# Patient Record
Sex: FEMALE | Race: BLACK OR AFRICAN AMERICAN | NOT HISPANIC OR LATINO | Employment: FULL TIME | URBAN - METROPOLITAN AREA
[De-identification: names, ages, dates, MRNs, and addresses within clinical notes are randomized per-mention and may not be internally consistent; named-entity substitution may affect disease eponyms.]

---

## 2018-01-15 NOTE — PROGRESS NOTES
Chief Complaint  Patient is here for a blood pressure check per Dr Sixto Trujillo  Patient denies cardiac complaints  I reviewed blood pressure reading with Dr Cheryl Danielle today  Patient was advised to continue taking current medication and checking blood pressure at home per Dr Cheryl Danielle  Active Problems    1  Abnormal bone density screening (794 9) (R93 7)   2  Abnormal echocardiogram (793 2) (R93 1)   3  Bacterial vaginosis (616 10,041 9) (N76 0,B96 89)   4  Candidiasis (112 9) (B37 9)   5  Carotid bruit (785 9) (R09 89)   6  Cough (786 2) (R05)   7  Diastolic dysfunction (126 0) (I51 9)   8  Encounter for gynecological examination without abnormal finding (V72 31) (Z01 419)   9  Encounter for screening for human papillomavirus (hpv) (V73 81) (Z11 51)   10  Encounter for screening mammogram for malignant neoplasm of breast (V76 12)    (Z12 31)   11  HTN (hypertension) (401 9) (I10)   12  Hyperlipidemia (272 4) (E78 5)   13  Hyponatremia (276 1) (E87 1)   14  Left atrial dilation (429 3) (I51 7)   15  Leukocytosis (288 60) (D72 829)   16  Low bone density (733 99) (M85 80)   17  LVH (left ventricular hypertrophy) (429 3) (I51 7)   18  Mild tricuspid regurgitation (397 0) (I07 1)   19  Need for immunization against influenza (V04 81) (Z23)   20  Pap smear for cervical cancer screening (V76 2) (Z12 4)   21  Proteinuria (791 0) (R80 9)   22  Screening breast examination (V76 10) (Z12 39)   23  Screening for breast cancer (V76 10) (Z12 39)   24  Screening for colon cancer (V76 51) (Z12 11)   25  Screening for hypothyroidism (V77 0) (Z13 29)   26  Tobacco abuse (305 1) (Z72 0)   27  Trace mitral regurgitation by prior echocardiogram (424 0) (I34 0)   28  UTI (urinary tract infection) (599 0) (N39 0)   29  Visit for screening mammogram (V76 12) (Z12 31)    Current Meds   1  AmLODIPine Besylate 5 MG Oral Tablet; take one tablet once daily;    Therapy: 45FRN3741 to (Evaluate:[id number])  Requested for: 51ZXH8220; Last Rx:47Rnp8062 Ordered   2  Metoprolol Tartrate 25 MG Oral Tablet; take 1 tablet twice a day; Therapy: 82HQK8836 to (Kiley Orozco)  Requested for: 85UDF8285 Recorded    Allergies    1  Lisinopril TABS    Assessment    1  HTN (hypertension) (401 9) (I10)    Future Appointments    Date/Time Provider Specialty Site   10/27/2016 02:40 PM ROBERT Driscoll  Obstetrics/Gynecology [facility]   07/26/2016 10:30 AM ROBERT Valerio   Internal Medicine [address] [place] [address] INTERNAL MED     Signatures   Electronically signed by : Richard Corey, ; Mar 25 2016  3:23PM EST                       (Author)    Electronically signed by : ROBERT Arguelles ; Mar 25 2016  3:26PM EST                       (Author)

## 2020-01-20 ENCOUNTER — HOSPITAL ENCOUNTER (OUTPATIENT)
Facility: HOSPITAL | Age: 60
Setting detail: OBSERVATION
Discharge: HOME | End: 2020-01-21
Attending: EMERGENCY MEDICINE | Admitting: INTERNAL MEDICINE
Payer: COMMERCIAL

## 2020-01-20 ENCOUNTER — APPOINTMENT (EMERGENCY)
Dept: RADIOLOGY | Facility: HOSPITAL | Age: 60
End: 2020-01-20
Payer: COMMERCIAL

## 2020-01-20 ENCOUNTER — DOCUMENTATION (OUTPATIENT)
Dept: OTHER | Facility: HOSPITAL | Age: 60
End: 2020-01-20

## 2020-01-20 DIAGNOSIS — I10 HTN (HYPERTENSION): ICD-10-CM

## 2020-01-20 DIAGNOSIS — R91.1 LUNG NODULE: Primary | ICD-10-CM

## 2020-01-20 DIAGNOSIS — R94.31 ABNORMAL EKG: ICD-10-CM

## 2020-01-20 DIAGNOSIS — R07.89 CHEST WALL PAIN: ICD-10-CM

## 2020-01-20 DIAGNOSIS — I16.0 HYPERTENSIVE URGENCY: ICD-10-CM

## 2020-01-20 DIAGNOSIS — I25.10 ASCVD (ARTERIOSCLEROTIC CARDIOVASCULAR DISEASE): ICD-10-CM

## 2020-01-20 PROBLEM — F17.200 SMOKER: Status: ACTIVE | Noted: 2020-01-20

## 2020-01-20 LAB
ALBUMIN SERPL BCP-MCNC: 4.3 G/DL (ref 3.5–5)
ALP SERPL-CCNC: 92 U/L (ref 46–116)
ALT SERPL W P-5'-P-CCNC: 100 U/L (ref 12–78)
AMPHETAMINES SERPL QL SCN: NEGATIVE
ANION GAP SERPL CALCULATED.3IONS-SCNC: 8 MMOL/L (ref 4–13)
AST SERPL W P-5'-P-CCNC: 76 U/L (ref 5–45)
ATRIAL RATE: 68 BPM
ATRIAL RATE: 97 BPM
BARBITURATES UR QL: NEGATIVE
BASOPHILS # BLD AUTO: 0.06 THOUSANDS/ΜL (ref 0–0.1)
BASOPHILS NFR BLD AUTO: 1 % (ref 0–1)
BENZODIAZ UR QL: NEGATIVE
BILIRUB SERPL-MCNC: 1.33 MG/DL (ref 0.2–1)
BILIRUB UR QL STRIP: NEGATIVE
BUN SERPL-MCNC: 7 MG/DL (ref 5–25)
CALCIUM SERPL-MCNC: 9.7 MG/DL (ref 8.3–10.1)
CHLORIDE SERPL-SCNC: 98 MMOL/L (ref 100–108)
CLARITY UR: CLEAR
CO2 SERPL-SCNC: 26 MMOL/L (ref 21–32)
COCAINE UR QL: NEGATIVE
COLOR UR: YELLOW
CREAT SERPL-MCNC: 0.77 MG/DL (ref 0.6–1.3)
CREAT UR-MCNC: 119 MG/DL
D DIMER PPP FEU-MCNC: 0.96 UG/ML FEU
EOSINOPHIL # BLD AUTO: 0.07 THOUSAND/ΜL (ref 0–0.61)
EOSINOPHIL NFR BLD AUTO: 1 % (ref 0–6)
ERYTHROCYTE [DISTWIDTH] IN BLOOD BY AUTOMATED COUNT: 14.3 % (ref 11.6–15.1)
EST. AVERAGE GLUCOSE BLD GHB EST-MCNC: 97 MG/DL
GFR SERPL CREATININE-BSD FRML MDRD: 98 ML/MIN/1.73SQ M
GLUCOSE SERPL-MCNC: 92 MG/DL (ref 65–140)
GLUCOSE UR STRIP-MCNC: NEGATIVE MG/DL
HBA1C MFR BLD: 5 % (ref 4.2–6.3)
HCT VFR BLD AUTO: 35.6 % (ref 34.8–46.1)
HGB BLD-MCNC: 12.7 G/DL (ref 11.5–15.4)
HGB UR QL STRIP.AUTO: NEGATIVE
IMM GRANULOCYTES # BLD AUTO: 0.05 THOUSAND/UL (ref 0–0.2)
IMM GRANULOCYTES NFR BLD AUTO: 0 % (ref 0–2)
KETONES UR STRIP-MCNC: ABNORMAL MG/DL
LEUKOCYTE ESTERASE UR QL STRIP: NEGATIVE
LYMPHOCYTES # BLD AUTO: 1.73 THOUSANDS/ΜL (ref 0.6–4.47)
LYMPHOCYTES NFR BLD AUTO: 13 % (ref 14–44)
MCH RBC QN AUTO: 30.8 PG (ref 26.8–34.3)
MCHC RBC AUTO-ENTMCNC: 35.7 G/DL (ref 31.4–37.4)
MCV RBC AUTO: 86 FL (ref 82–98)
METHADONE UR QL: NEGATIVE
MICROALBUMIN UR-MCNC: 13.2 MG/L (ref 0–20)
MICROALBUMIN/CREAT 24H UR: 11 MG/G CREATININE (ref 0–30)
MONOCYTES # BLD AUTO: 1.24 THOUSAND/ΜL (ref 0.17–1.22)
MONOCYTES NFR BLD AUTO: 10 % (ref 4–12)
NEUTROPHILS # BLD AUTO: 9.8 THOUSANDS/ΜL (ref 1.85–7.62)
NEUTS SEG NFR BLD AUTO: 75 % (ref 43–75)
NITRITE UR QL STRIP: NEGATIVE
NRBC BLD AUTO-RTO: 0 /100 WBCS
OPIATES UR QL SCN: POSITIVE
P AXIS: 235 DEGREES
P AXIS: 68 DEGREES
PCP UR QL: NEGATIVE
PH UR STRIP.AUTO: 7 [PH]
PLATELET # BLD AUTO: 469 THOUSANDS/UL (ref 149–390)
PMV BLD AUTO: 10.4 FL (ref 8.9–12.7)
POTASSIUM SERPL-SCNC: 3.6 MMOL/L (ref 3.5–5.3)
PR INTERVAL: 124 MS
PR INTERVAL: 128 MS
PROT SERPL-MCNC: 8 G/DL (ref 6.4–8.2)
PROT UR STRIP-MCNC: NEGATIVE MG/DL
QRS AXIS: 56 DEGREES
QRS AXIS: 79 DEGREES
QRSD INTERVAL: 78 MS
QRSD INTERVAL: 78 MS
QT INTERVAL: 322 MS
QT INTERVAL: 380 MS
QTC INTERVAL: 404 MS
QTC INTERVAL: 408 MS
RBC # BLD AUTO: 4.12 MILLION/UL (ref 3.81–5.12)
SODIUM SERPL-SCNC: 132 MMOL/L (ref 136–145)
SP GR UR STRIP.AUTO: >1.045 (ref 1–1.03)
T WAVE AXIS: -22 DEGREES
T WAVE AXIS: 86 DEGREES
THC UR QL: POSITIVE
TROPONIN I SERPL-MCNC: <0.02 NG/ML
TSH SERPL DL<=0.05 MIU/L-ACNC: 5.93 UIU/ML (ref 0.36–3.74)
UROBILINOGEN UR QL STRIP.AUTO: 1 E.U./DL
VENTRICULAR RATE: 68 BPM
VENTRICULAR RATE: 97 BPM
WBC # BLD AUTO: 12.95 THOUSAND/UL (ref 4.31–10.16)

## 2020-01-20 PROCEDURE — 86705 HEP B CORE ANTIBODY IGM: CPT | Performed by: INTERNAL MEDICINE

## 2020-01-20 PROCEDURE — 93005 ELECTROCARDIOGRAM TRACING: CPT

## 2020-01-20 PROCEDURE — 86803 HEPATITIS C AB TEST: CPT | Performed by: INTERNAL MEDICINE

## 2020-01-20 PROCEDURE — 36415 COLL VENOUS BLD VENIPUNCTURE: CPT

## 2020-01-20 PROCEDURE — 84484 ASSAY OF TROPONIN QUANT: CPT | Performed by: EMERGENCY MEDICINE

## 2020-01-20 PROCEDURE — 94640 AIRWAY INHALATION TREATMENT: CPT

## 2020-01-20 PROCEDURE — 99285 EMERGENCY DEPT VISIT HI MDM: CPT

## 2020-01-20 PROCEDURE — 82570 ASSAY OF URINE CREATININE: CPT | Performed by: INTERNAL MEDICINE

## 2020-01-20 PROCEDURE — 71275 CT ANGIOGRAPHY CHEST: CPT

## 2020-01-20 PROCEDURE — 99285 EMERGENCY DEPT VISIT HI MDM: CPT | Performed by: EMERGENCY MEDICINE

## 2020-01-20 PROCEDURE — 81003 URINALYSIS AUTO W/O SCOPE: CPT | Performed by: INTERNAL MEDICINE

## 2020-01-20 PROCEDURE — 82043 UR ALBUMIN QUANTITATIVE: CPT | Performed by: INTERNAL MEDICINE

## 2020-01-20 PROCEDURE — 83036 HEMOGLOBIN GLYCOSYLATED A1C: CPT | Performed by: INTERNAL MEDICINE

## 2020-01-20 PROCEDURE — 93010 ELECTROCARDIOGRAM REPORT: CPT | Performed by: INTERNAL MEDICINE

## 2020-01-20 PROCEDURE — 85025 COMPLETE CBC W/AUTO DIFF WBC: CPT | Performed by: EMERGENCY MEDICINE

## 2020-01-20 PROCEDURE — 36415 COLL VENOUS BLD VENIPUNCTURE: CPT | Performed by: INTERNAL MEDICINE

## 2020-01-20 PROCEDURE — 96375 TX/PRO/DX INJ NEW DRUG ADDON: CPT

## 2020-01-20 PROCEDURE — 84443 ASSAY THYROID STIM HORMONE: CPT | Performed by: INTERNAL MEDICINE

## 2020-01-20 PROCEDURE — 87340 HEPATITIS B SURFACE AG IA: CPT | Performed by: INTERNAL MEDICINE

## 2020-01-20 PROCEDURE — 80307 DRUG TEST PRSMV CHEM ANLYZR: CPT | Performed by: INTERNAL MEDICINE

## 2020-01-20 PROCEDURE — 86704 HEP B CORE ANTIBODY TOTAL: CPT | Performed by: INTERNAL MEDICINE

## 2020-01-20 PROCEDURE — 96374 THER/PROPH/DIAG INJ IV PUSH: CPT

## 2020-01-20 PROCEDURE — 85379 FIBRIN DEGRADATION QUANT: CPT | Performed by: EMERGENCY MEDICINE

## 2020-01-20 PROCEDURE — 84484 ASSAY OF TROPONIN QUANT: CPT | Performed by: INTERNAL MEDICINE

## 2020-01-20 PROCEDURE — 71046 X-RAY EXAM CHEST 2 VIEWS: CPT

## 2020-01-20 PROCEDURE — 80053 COMPREHEN METABOLIC PANEL: CPT | Performed by: EMERGENCY MEDICINE

## 2020-01-20 RX ORDER — KETOROLAC TROMETHAMINE 30 MG/ML
15 INJECTION, SOLUTION INTRAMUSCULAR; INTRAVENOUS ONCE
Status: COMPLETED | OUTPATIENT
Start: 2020-01-20 | End: 2020-01-20

## 2020-01-20 RX ORDER — LABETALOL 20 MG/4 ML (5 MG/ML) INTRAVENOUS SYRINGE
20 EVERY 4 HOURS PRN
Status: DISCONTINUED | OUTPATIENT
Start: 2020-01-20 | End: 2020-01-21 | Stop reason: HOSPADM

## 2020-01-20 RX ORDER — NICOTINE 21 MG/24HR
1 PATCH, TRANSDERMAL 24 HOURS TRANSDERMAL DAILY
Status: DISCONTINUED | OUTPATIENT
Start: 2020-01-21 | End: 2020-01-21 | Stop reason: HOSPADM

## 2020-01-20 RX ORDER — NITROGLYCERIN 0.4 MG/1
0.4 TABLET SUBLINGUAL
Status: DISCONTINUED | OUTPATIENT
Start: 2020-01-20 | End: 2020-01-21 | Stop reason: HOSPADM

## 2020-01-20 RX ORDER — HYDRALAZINE HYDROCHLORIDE 20 MG/ML
10 INJECTION INTRAMUSCULAR; INTRAVENOUS EVERY 6 HOURS PRN
Status: DISCONTINUED | OUTPATIENT
Start: 2020-01-20 | End: 2020-01-21 | Stop reason: HOSPADM

## 2020-01-20 RX ORDER — AMLODIPINE BESYLATE 10 MG/1
10 TABLET ORAL DAILY
Status: DISCONTINUED | OUTPATIENT
Start: 2020-01-20 | End: 2020-01-21 | Stop reason: HOSPADM

## 2020-01-20 RX ORDER — ACETAMINOPHEN 325 MG/1
650 TABLET ORAL EVERY 4 HOURS PRN
Status: DISCONTINUED | OUTPATIENT
Start: 2020-01-20 | End: 2020-01-21 | Stop reason: HOSPADM

## 2020-01-20 RX ORDER — ONDANSETRON 2 MG/ML
4 INJECTION INTRAMUSCULAR; INTRAVENOUS ONCE
Status: DISCONTINUED | OUTPATIENT
Start: 2020-01-20 | End: 2020-01-21

## 2020-01-20 RX ORDER — ASPIRIN 81 MG/1
324 TABLET, CHEWABLE ORAL ONCE
Status: COMPLETED | OUTPATIENT
Start: 2020-01-20 | End: 2020-01-20

## 2020-01-20 RX ORDER — ONDANSETRON 2 MG/ML
4 INJECTION INTRAMUSCULAR; INTRAVENOUS EVERY 6 HOURS PRN
Status: DISCONTINUED | OUTPATIENT
Start: 2020-01-20 | End: 2020-01-21 | Stop reason: HOSPADM

## 2020-01-20 RX ORDER — CHLORTHALIDONE 25 MG/1
50 TABLET ORAL DAILY
Status: DISCONTINUED | OUTPATIENT
Start: 2020-01-20 | End: 2020-01-21 | Stop reason: HOSPADM

## 2020-01-20 RX ORDER — IPRATROPIUM BROMIDE AND ALBUTEROL SULFATE 2.5; .5 MG/3ML; MG/3ML
3 SOLUTION RESPIRATORY (INHALATION) ONCE
Status: COMPLETED | OUTPATIENT
Start: 2020-01-20 | End: 2020-01-20

## 2020-01-20 RX ORDER — LIDOCAINE 50 MG/G
1 PATCH TOPICAL DAILY
Status: DISCONTINUED | OUTPATIENT
Start: 2020-01-21 | End: 2020-01-21 | Stop reason: HOSPADM

## 2020-01-20 RX ORDER — ONDANSETRON 2 MG/ML
INJECTION INTRAMUSCULAR; INTRAVENOUS
Status: DISPENSED
Start: 2020-01-20 | End: 2020-01-20

## 2020-01-20 RX ADMIN — IOHEXOL 85 ML: 350 INJECTION, SOLUTION INTRAVENOUS at 14:21

## 2020-01-20 RX ADMIN — CHLORTHALIDONE 50 MG: 25 TABLET ORAL at 20:40

## 2020-01-20 RX ADMIN — IPRATROPIUM BROMIDE AND ALBUTEROL SULFATE 3 ML: .5; 3 SOLUTION RESPIRATORY (INHALATION) at 11:34

## 2020-01-20 RX ADMIN — LABETALOL 20 MG/4 ML (5 MG/ML) INTRAVENOUS SYRINGE 20 MG: at 18:50

## 2020-01-20 RX ADMIN — KETOROLAC TROMETHAMINE 15 MG: 30 INJECTION, SOLUTION INTRAMUSCULAR at 15:50

## 2020-01-20 RX ADMIN — AMLODIPINE BESYLATE 10 MG: 10 TABLET ORAL at 20:40

## 2020-01-20 RX ADMIN — ASPIRIN 81 MG 324 MG: 81 TABLET ORAL at 11:33

## 2020-01-20 RX ADMIN — MORPHINE SULFATE 2 MG: 2 INJECTION, SOLUTION INTRAMUSCULAR; INTRAVENOUS at 11:34

## 2020-01-20 NOTE — DISCHARGE INSTRUCTIONS
Hypertension   WHAT YOU NEED TO KNOW:   Hypertension is high blood pressure (BP)  Your BP is the force of your blood moving against the walls of your arteries  Normal BP is less than 120/80  Prehypertension is between 120/80 and 139/89  Hypertension is 140/90 or higher  Hypertension causes your BP to get so high that your heart has to work much harder than normal  This can damage your heart  You can control hypertension with a healthy lifestyle or medicines  A controlled blood pressure helps protect your organs, such as your heart, lungs, brain, and kidneys  DISCHARGE INSTRUCTIONS:   Call 911 for any of the following:   · You have discomfort in your chest that feels like squeezing, pressure, fullness, or pain  · You become confused or have difficulty speaking  · You suddenly feel lightheaded or have trouble breathing  · You have pain or discomfort in your back, neck, jaw, stomach, or arm  Return to the emergency department if:   · You have a severe headache or vision loss  · You have weakness in an arm or leg  Contact your healthcare provider if:   · You feel faint, dizzy, confused, or drowsy  · You have been taking your BP medicine and your BP is still higher than your healthcare provider says it should be  · You have questions or concerns about your condition or care  Medicines: You may  need any of the following:  · Medicine  may be used to help lower your BP  You may need more than one type of medicine  Take the medicine exactly as directed  · Diuretics  help decrease extra fluid that collects in your body  This will help lower your BP  You may urinate more often while you take this medicine  · Cholesterol medicine  helps lower your cholesterol level  A low cholesterol level helps prevent heart disease and makes it easier to control your blood pressure  · Take your medicine as directed    Contact your healthcare provider if you think your medicine is not helping or if you have side effects  Tell him or her if you are allergic to any medicine  Keep a list of the medicines, vitamins, and herbs you take  Include the amounts, and when and why you take them  Bring the list or the pill bottles to follow-up visits  Carry your medicine list with you in case of an emergency  Follow up with your healthcare provider as directed: You will need to return to have your BP checked and to have other lab tests done  Write down your questions so you remember to ask them during your visits  Manage hypertension:  Talk with your healthcare provider about these and other ways to manage hypertension:  · Check your BP at home  Sit and rest for 5 minutes before you take your BP  Extend your arm and support it on a flat surface  Your arm should be at the same level as your heart  Follow the directions that came with your BP monitor  If possible, take at least 2 BP readings each time  Take your BP at least twice a day at the same times each day, such as morning and evening  Keep a record of your BP readings and bring it to your follow-up visits  Ask your healthcare provider what your BP should be  · Limit sodium (salt) as directed  Too much sodium can affect your fluid balance  Check labels to find low-sodium or no-salt-added foods  Some low-sodium foods use potassium salts for flavor  Too much potassium can also cause health problems  Your healthcare provider will tell you how much sodium and potassium are safe for you to have in a day  He or she may recommend that you limit sodium to 2,300 mg a day  · Follow the meal plan recommended by your healthcare provider  A dietitian or your provider can give you more information on low-sodium plans or the DASH (Dietary Approaches to Stop Hypertension) eating plan  The DASH plan is low in sodium, unhealthy fats, and total fat  It is high in potassium, calcium, and fiber  · Exercise to maintain a healthy weight    Exercise at least 30 minutes per day, on most days of the week  This will help decrease your blood pressure  Ask your healthcare provider about the best exercise plan for you  · Decrease stress  This may help lower your BP  Learn ways to relax, such as deep breathing or listening to music  · Limit alcohol  Women should limit alcohol to 1 drink a day  Men should limit alcohol to 2 drinks a day  A drink of alcohol is 12 ounces of beer, 5 ounces of wine, or 1½ ounces of liquor  · Do not smoke  Nicotine and other chemicals in cigarettes and cigars can increase your BP and also cause lung damage  Ask your healthcare provider for information if you currently smoke and need help to quit  E-cigarettes or smokeless tobacco still contain nicotine  Talk to your healthcare provider before you use these products  · Manage any other health conditions you have  Health conditions such as diabetes can increase your risk for hypertension  Follow your healthcare provider's instructions and take all your medicines as directed  © 2017 [address] Information is for End User's use only and may not be sold, redistributed or otherwise used for commercial purposes  All illustrations and images included in CareNotes® are the copyrighted property of [employer] , [employer]  or [employer]  The above information is an  only  It is not intended as medical advice for individual conditions or treatments  Talk to your doctor, nurse or pharmacist before following any medical regimen to see if it is safe and effective for you

## 2020-01-20 NOTE — H&P
History & Physical - SOD      PATIENT INFORMATION      Jl Pierson 61 y o  female MRN: [id number]  Unit/Bed#: ED 16 Encounter: [id number]  Admitting Physician: Nathalie Galindo MD  PCP: Paty Louis MD  Date of Admission:  01/20/20      ASSESSMENTS & PLAN       1  Musculoskeletal chest pain  Atypical   Heart score 3  Not related to exertion or rest   Worse with palpation, arm movement, and multiple physicians  Improved with Ketoralac  Likely musculoskeletal in nature  However will perform ACS rule  · EKG/troponin x3  · Telemetry  · Nitroglycerin p r n  · Status post aspirin  · Voltaren gel and tylenol for musculoskeletal pain  · Blood pressure control as below    2  Hypertensive urgency  No evidence of visual changes, pulmonary edema, altered mental status, dissection his suggest emergency  Previously on blood pressure medications, however has not seen a doctor for a while due to losing insurance per patient  · Start amlodipine 10 mg and chlorthalidone 50 mg  · Labetalol p r n  For systolic blood pressure greater than 160    3  Smoker  Half pack per day for 30 years  Actively smoking  · Nicotine patch  · Counseled on cessation    4  Elevated LFTs  Possibly secondary to alcohol use  Patient drinks 6 pack of beer a day on week  · RUQ U/S, Chronic Hep Panel  · Counseled on alcohol cessation      VTE Pharmacologic Prophylaxis: Enoxaparin (Lovenox)   VTE Mechanical Prophylaxis: SCD's      CHIEF COMPLAINT      CP      HISTORY OF PRESENT ILLNESS      Jl Pierson is a 61 y o  female who presents with past medical history of hypertension come smoking presents for chest pain  Patient states for the past 2 days she has intermittent right-sided chest pain, 5/10 intensity, pressure-like in quality, intermittently radiating to the back  Pain is worse with arm movement, body torsion, cough, palpation  Alleviated with Toradol  No other new symptoms    Does have a chronic cough she attributes to smoking that is intermittently productive of white sputum  Denies all other review of symptoms  In the ED, vital signs showed systolic blood pressure 704 and diastolic blood pressure 604  Troponin was negative  Initial EKG was normal however repeat EKG showed T-wave flattening in the inferior leads  Labs are significant for WBC 12 95 , sodium 132, elevated LFTs with AST 76   REVIEW OF SYSTEMS      ROS: A complete 12 point ROS is negative other than that noted in the HPI      PAST MEDICAL & SURGICAL HISTORY      Past Medical History:   Diagnosis Date    Hypertension     Smoking 1/2 pack a day or less        No past surgical history on file  MEDICATIONS & ALLERGIES     Prior to Admission medications    Not on File         Allergies: Allergies   Allergen Reactions    Lisinopril          SOCIAL HISTORY      Substance Use History:   Social History     Substance and Sexual Activity   Alcohol Use Never    Frequency: Never     Social History     Tobacco Use   Smoking Status Current Every Day Smoker    Types: Cigarettes   Smokeless Tobacco Never Used     Social History     Substance and Sexual Activity   Drug Use Never         FAMILY HISTORY      No family history on file  PHYSICAL EXAM      Vitals:   Blood Pressure: (!) 217/88 (01/20/20 1700)  Pulse: 90 (01/20/20 1700)  Temperature: 98 4 °F (36 9 °C) (01/20/20 1026)  Temp Source: Oral (01/20/20 1026)  Respirations: (!) 32 (01/20/20 1700)  Weight - Scale: 49 8 kg (109 lb 12 6 oz) (01/20/20 1026)  SpO2: 100 % (01/20/20 1700)      GENERAL: NAD  HEENT:  NC/AT, PERRL, EOMI, MMM, no scleral icterus  CARDIAC:  RRR, +S1/S2, no m/g/r, no S3/S4 heard  PULMONARY:  CTA B/L, no wheezing/rales/rhonci, non-labored breathing  ABDOMEN:  Soft, NT/ND, +BS, no rebound/guarding/rigidity  Extremities:  2+ Pulses in DP/PT  No edema, cyanosis, or clubbing  NEUROLOGIC:  Alert/oriented x3   No motor or sensory deficits  SKIN:  No rashes or erythema      ADDITIONAL DATA     Lab Results:     Results from last 7 days   Lab Units 01/20/20  1053   WBC Thousand/uL 12 95*   HEMOGLOBIN g/dL 12 7   HEMATOCRIT % 35 6   PLATELETS Thousands/uL 469*   NEUTROS PCT % 75   LYMPHS PCT % 13*   MONOS PCT % 10   EOS PCT % 1     Results from last 7 days   Lab Units 01/20/20  1053   POTASSIUM mmol/L 3 6   CHLORIDE mmol/L 98*   CO2 mmol/L 26   BUN mg/dL 7   CREATININE mg/dL 0 77   CALCIUM mg/dL 9 7   ALK PHOS U/L 92   ALT U/L 100*   AST U/L 76*           Imaging:     Xr Chest 2 Views    Result Date: 1/20/2020  Narrative: CHEST INDICATION:   Chest Pain  Shortness of breath and cough COMPARISON:  2/3/2015 EXAM PERFORMED/VIEWS:  XR CHEST PA & LATERAL  The frontal view was performed utilizing dual energy radiographic technique  FINDINGS: Cardiomediastinal silhouette appears unremarkable  The lungs are clear  No pneumothorax or pleural effusion  Osseous structures appear within normal limits for patient age  Impression: No acute cardiopulmonary disease  Workstation performed: [id number]     Hernandez Dodsons Ed Chest Pe Study    Result Date: 1/20/2020  Narrative: CTA - CHEST WITH IV CONTRAST - PULMONARY ANGIOGRAM INDICATION:   PE suspected, intermediate prob, positive D-dimer  COMPARISON: None  TECHNIQUE: CTA examination of the chest was performed using angiographic technique according to a protocol specifically tailored to evaluate for pulmonary embolism  Axial, sagittal, and coronal 2D reformatted images were created from the source data and  submitted for interpretation  In addition, coronal 3D MIP postprocessing was performed on the acquisition scanner  Radiation dose length product (DLP) for this visit:  217 mGy-cm   This examination, like all CT scans performed in the [facility], was performed utilizing techniques to minimize radiation dose exposure, including the use of iterative reconstruction and automated exposure control   IV Contrast:  85 mL of iohexol (OMNIPAQUE)  FINDINGS: PULMONARY ARTERIAL TREE:  No pulmonary embolus is seen  LUNGS:  Several subcentimeter nodules are seen along the right major fissure, likely fissural lymph nodes  3 mm groundglass nodule is present within the left upper lobe (series 4 image 57)  3 mm nodules present within the left lower lobe (series 4 image 89)  4 mm nodule is present within the left lower lobe (series 4 image 74)  Centrilobular and paraseptal emphysema is present  No focal consolidations, pneumothorax, or tracheal lesions  PLEURA:  Unremarkable  HEART/GREAT VESSELS:  Unremarkable for patient's age  MEDIASTINUM AND CASSIUS:  Unremarkable  CHEST WALL AND LOWER NECK:   Unremarkable  VISUALIZED STRUCTURES IN THE UPPER ABDOMEN:  Unremarkable  OSSEOUS STRUCTURES:  No acute fracture or destructive osseous lesion  Impression: 1  No pulmonary embolism  2   Subcentimeter pulmonary nodules, largest measuring 4 mm  Based on current Fleischner Society 2017 Guidelines on incidental pulmonary nodule, because the patient is considered high risk for lung cancer, 12 month follow-up non-contrast chest CT is recommended  The study was marked in EPIC for significant notification  Workstation performed: [id number]       EKG, Pathology, and Other Studies Reviewed on Admission:   · EKG: Reviewed      Code Status: Level 1 - Full Code  Admission Status: ROBERT Camarillo  Internal Medicine PGY-3  1/20/2020 5:34 PM      Total Time for Visit, including Counseling / Coordination of Care: 1 hour total time spent admitting patient  Greater than 50% of this total time spent on direct patient counseling and coordination of care     ** Please Note: This note is constructed using a voice recognition dictation system   **

## 2020-01-20 NOTE — PROGRESS NOTES
Clinical Trial Consent Note  Clinical Trial:   : Lashawn Osman MD        Patient was approached on 01/20/20 for consent to enroll into the TRUST study  Consent form was reviewed with the patient, patient was given the opportunity to ask questions  All the patient's questions were answered  A "teach back" WAS NOTconducted and the patient verbalized understanding of the study  The patient agreed, signed and dated the informed consent, and a copy was provided to the patient  The patient will now be screened for eligibility into the study  Wells score is 1 5  Blood samples obtained via fresh stick    Follow up in 3 months per protocol on or about [id number] 01/20/20

## 2020-01-20 NOTE — ED ATTENDING ATTESTATION
1/20/2020  IRosetta MD, saw and evaluated the patient  I have discussed the patient with the resident/non-physician practitioner and agree with the resident's/non-physician practitioner's findings, Plan of Care, and MDM as documented in the resident's/non-physician practitioner's note, except where noted  All available labs and Radiology studies were reviewed  I was present for key portions of any procedure(s) performed by the resident/non-physician practitioner and I was immediately available to provide assistance  At this point I agree with the current assessment done in the Emergency Department    I have conducted an independent evaluation of this patient a history and physical is as follows:  Patient presents for evaluation of 2 day history of right-sided chest pain is worse if she takes a deep breath or she coughs she has had a productive cough white sputum patient is a smoker she has felt hot and cold but not documented a temperature at home  No hemoptysis no leg pain or leg swelling no history of DVT or pulmonary embolism no history of coronary artery disease  Cardiac risk factors hypertension and smoking  Exam well-appearing no acute distress HEENT exam unremarkable neck no JVD lungs coarse breath sounds no wheezing or rales heart regular no murmurs abdomen soft nontender extremities normal nontender  Impression chest pain  Differential considerations would include pneumothorax, bronchitis, pneumonia, less likely DVT other possibilities would include ACS  EKG shows normal sinus rhythm at approximately a rate of 100 with some minor nonspecific ST-T changes  Chest x-ray pending labs pending  ED Course         Critical Care Time  Procedures

## 2020-01-20 NOTE — ED PROVIDER NOTES
History  Chief Complaint   Patient presents with    Chest Pain     chest pain, cough, SOB     40-year-old female presenting emergency department with sharp right-sided chest pain radiating towards her back that started 2 days ago  Patient reports pain has been constant has not worsened has not improved  Reports she comes to the emergency room today because of going away  She notes associated shortness of breath  She has a chronic cough which she feels is worse  She denies any productivity of the cough  No hemoptysis  Denies any exertional complaints  Notes the pain may be worse when she sits up verses when she lays down  She denies nausea vomiting diarrhea abdominal pain fevers or chills or any other complaints on review of systems  Denies any leg swelling, claudication, travel, surgery, history of blood clots  She has history of hypertension but has recently been noncompliant blood pressure medications that she has not had money to fill her prescriptions or follow-up with her primary care physician  She reports she recently got her insurance back and will be visiting her primary care doctor soon  Chest Pain   Associated symptoms: cough and shortness of breath    Associated symptoms: no abdominal pain, no fever, no headache, no nausea, no numbness, no palpitations, not vomiting and no weakness        None       Past Medical History:   Diagnosis Date    Hypertension     Smoking 1/2 pack a day or less        No past surgical history on file  No family history on file  I have reviewed and agree with the history as documented  Social History     Tobacco Use    Smoking status: Current Every Day Smoker     Types: Cigarettes    Smokeless tobacco: Never Used   Substance Use Topics    Alcohol use: Never     Frequency: Never    Drug use: Never        Review of Systems   Constitutional: Negative for chills and fever  HENT: Negative for congestion and sore throat      Eyes: Negative for visual disturbance  Respiratory: Positive for cough and shortness of breath  Cardiovascular: Positive for chest pain  Negative for palpitations  Gastrointestinal: Negative for abdominal pain, diarrhea, nausea and vomiting  Genitourinary: Negative for difficulty urinating and dysuria  Musculoskeletal: Negative for myalgias  Skin: Negative for rash  Neurological: Negative for weakness, light-headedness, numbness and headaches  Physical Exam  ED Triage Vitals   Temperature Pulse Respirations Blood Pressure SpO2   01/20/20 1026 01/20/20 1026 01/20/20 1026 01/20/20 1026 01/20/20 1026   98 4 °F (36 9 °C) 104 22 (!) 198/112 98 %      Temp Source Heart Rate Source Patient Position - Orthostatic VS BP Location FiO2 (%)   01/20/20 1026 01/20/20 1154 01/20/20 1154 01/20/20 1026 --   Oral Monitor Lying Left arm       Pain Score       01/20/20 1026       Worst Possible Pain             Orthostatic Vital Signs  Vitals:    01/21/20 0315 01/21/20 0416 01/21/20 0533 01/21/20 0615   BP:  154/72 152/82    Pulse: 68 61 64 60   Patient Position - Orthostatic VS:  Lying Lying        Physical Exam   Constitutional: She is oriented to person, place, and time  She appears well-developed and well-nourished  No distress  HENT:   Head: Normocephalic and atraumatic  Eyes: Pupils are equal, round, and reactive to light  EOM are normal    Neck: Normal range of motion  Neck supple  Cardiovascular: Normal rate, regular rhythm, normal heart sounds, intact distal pulses and normal pulses  Pulses:       Radial pulses are 2+ on the right side, and 2+ on the left side  Dorsalis pedis pulses are 2+ on the right side, and 2+ on the left side  Pulmonary/Chest: Effort normal and breath sounds normal  No respiratory distress  Tenderness on AP compression of the chest    Abdominal: Soft  Bowel sounds are normal  There is no tenderness  Musculoskeletal: Normal range of motion          Right lower leg: She exhibits no tenderness and no edema  Left lower leg: She exhibits no tenderness and no edema  Neurological: She is alert and oriented to person, place, and time  Skin: Skin is warm and dry  Capillary refill takes less than 2 seconds  Psychiatric: She has a normal mood and affect  Nursing note and vitals reviewed        ED Medications  Medications   ondansetron (ZOFRAN) injection 4 mg ( Intravenous Not Given 1/20/20 1140)   nicotine (NICODERM CQ) 14 mg/24hr TD 24 hr patch 1 patch (has no administration in time range)   ondansetron (ZOFRAN) injection 4 mg (has no administration in time range)   nitroglycerin (NITROSTAT) SL tablet 0 4 mg (has no administration in time range)   enoxaparin (LOVENOX) subcutaneous injection 40 mg (has no administration in time range)   acetaminophen (TYLENOL) tablet 650 mg (has no administration in time range)   diclofenac sodium (VOLTAREN) 1 % topical gel 2 g ( Topical Canceled Entry 1/20/20 2251)   lidocaine (LIDODERM) 5 % patch 1 patch (has no administration in time range)   chlorthalidone tablet 50 mg (50 mg Oral Given 1/20/20 2040)   amLODIPine (NORVASC) tablet 10 mg (10 mg Oral Given 1/20/20 2040)   Labetalol HCl (NORMODYNE) injection 20 mg (20 mg Intravenous Given 1/20/20 1850)   hydrALAZINE (APRESOLINE) injection 10 mg (has no administration in time range)   aspirin chewable tablet 324 mg (324 mg Oral Given 1/20/20 1133)   morphine injection 2 mg (2 mg Intravenous Given 1/20/20 1134)   ipratropium-albuterol (DUO-NEB) 0 5-2 5 mg/3 mL inhalation solution 3 mL (3 mL Nebulization Given 1/20/20 1134)   iohexol (OMNIPAQUE) 350 MG/ML injection (MULTI-DOSE) 85 mL (85 mL Intravenous Given 1/20/20 1421)   ketorolac (TORADOL) injection 15 mg (15 mg Intravenous Given 1/20/20 1550)       Diagnostic Studies  Results Reviewed     Procedure Component Value Units Date/Time    Comprehensive metabolic panel [[id number]]  (Abnormal) Collected:  01/21/20 3669    Lab Status:  Final result Specimen: Blood from Arm, Right Updated:  01/21/20 0508     Sodium 132 mmol/L      Potassium 3 4 mmol/L      Chloride 95 mmol/L      CO2 27 mmol/L      ANION GAP 10 mmol/L      BUN 12 mg/dL      Creatinine 0 72 mg/dL      Glucose 92 mg/dL      Calcium 8 5 mg/dL      AST 47 U/L      ALT 75 U/L      Alkaline Phosphatase 79 U/L      Total Protein 7 3 g/dL      Albumin 3 7 g/dL      Total Bilirubin 1 23 mg/dL      eGFR 106 ml/min/1 73sq m     Narrative:       [place] guidelines for Chronic Kidney Disease (CKD):     Stage 1 with normal or high GFR (GFR > 90 mL/min/1 73 square meters)    Stage 2 Mild CKD (GFR = 60-89 mL/min/1 73 square meters)    Stage 3A Moderate CKD (GFR = 45-59 mL/min/1 73 square meters)    Stage 3B Moderate CKD (GFR = 30-44 mL/min/1 73 square meters)    Stage 4 Severe CKD (GFR = 15-29 mL/min/1 73 square meters)    Stage 5 End Stage CKD (GFR <15 mL/min/1 73 square meters)  Note: GFR calculation is accurate only with a steady state creatinine    Magnesium [[id number]]  (Normal) Collected:  01/21/20 0415    Lab Status:  Final result Specimen:  Blood from Arm, Right Updated:  01/21/20 0458     Magnesium 2 1 mg/dL     Phosphorus [[id number]]  (Normal) Collected:  01/21/20 0415    Lab Status:  Final result Specimen:  Blood from Arm, Right Updated:  01/21/20 0458     Phosphorus 4 0 mg/dL     Lipid Panel with Direct LDL reflex [[id number]]  (Abnormal) Collected:  01/21/20 0415    Lab Status:  Final result Specimen:  Blood from Arm, Right Updated:  01/21/20 0458     Cholesterol 255 mg/dL      Triglycerides 71 mg/dL      HDL, Direct 155 mg/dL      LDL Calculated 86 mg/dL     CBC (With Platelets) [[id number]]  (Abnormal) Collected:  01/21/20 0415    Lab Status:  Final result Specimen:  Blood from Arm, Right Updated:  01/21/20 0425     WBC 9 47 Thousand/uL      RBC 3 64 Million/uL      Hemoglobin 11 1 g/dL      Hematocrit 32 4 %      MCV 89 fL      MCH 30 5 pg      MCHC 34 3 g/dL      RDW 14 0 %      Platelets 571 Thousands/uL      MPV 10 0 fL     Microalbumin / creatinine urine ratio [[id number]] Collected:  01/20/20 1921    Lab Status:  Final result Specimen:  Urine, Clean Catch Updated:  01/20/20 2255     Creatinine, Ur 119 0 mg/dL      Microalbum  ,U,Random 13 2 mg/L      Microalb Creat Ratio 11 mg/g creatinine     Troponin I [[id number]]  (Normal) Collected:  01/20/20 2046    Lab Status:  Final result Specimen:  Blood from Arm, Right Updated:  01/20/20 2110     Troponin I <0 02 ng/mL     Rapid drug screen, urine [[id number]]  (Abnormal) Collected:  01/20/20 1921    Lab Status:  Final result Specimen:  Urine, Clean Catch Updated:  01/20/20 2044     Amph/Meth UR Negative     Barbiturate Ur Negative     Benzodiazepine Urine Negative     Cocaine Urine Negative     Methadone Urine Negative     Opiate Urine Positive     PCP Ur Negative     THC Urine Positive    Narrative:       Presumptive report  If requested, specimen will be sent to reference lab for confirmation  FOR MEDICAL PURPOSES ONLY  IF CONFIRMATION NEEDED PLEASE CONTACT THE LAB WITHIN 5 DAYS      Drug Screen Cutoff Levels:  AMPHETAMINE/METHAMPHETAMINES  1000 ng/mL  BARBITURATES     200 ng/mL  BENZODIAZEPINES     200 ng/mL  COCAINE      300 ng/mL  METHADONE      300 ng/mL  OPIATES      300 ng/mL  PHENCYCLIDINE     25 ng/mL  THC       50 ng/mL      Hemoglobin A1C w/ EAG Estimation [[id number]] Collected:  01/20/20 1053    Lab Status:  Final result Specimen:  Blood from Arm, Right Updated:  01/20/20 2042     Hemoglobin A1C 5 0 %      EAG 97 mg/dl     UA w Reflex to Microscopic w Reflex to Culture [[id number]]  (Abnormal) Collected:  01/20/20 1922    Lab Status:  Final result Specimen:  Urine, Clean Catch Updated:  01/20/20 2038     Color, UA Yellow     Clarity, UA Clear     Specific Gravity, UA >1 045     pH, UA 7 0     Leukocytes, UA Negative     Nitrite, UA Negative     Protein, UA Negative mg/dl      Glucose, UA Negative mg/dl      Ketones, UA Trace mg/dl      Urobilinogen, UA 1 0 E U /dl      Bilirubin, UA Negative     Blood, UA Negative    TSH, 3rd generation [[id number]]  (Abnormal) Collected:  01/20/20 1844    Lab Status:  Final result Specimen:  Blood Updated:  01/20/20 1917     TSH 3RD Omie Cera 5 930 uIU/mL     Narrative:       Patients undergoing fluorescein dye angiography may retain small amounts of fluorescein in the body for 48-72 hours post procedure  Samples containing fluorescein can produce falsely depressed TSH values  If the patient had this procedure,a specimen should be resubmitted post fluorescein clearance  Troponin I [[id number]]  (Normal) Resulted:  01/20/20 1910    Lab Status:  Final result Specimen:  Blood Updated:  01/20/20 1910     Troponin I <0 02 ng/mL     Chronic Hepatitis Panel [[id number]] Updated:  01/20/20 1844    Lab Status:   In process Specimen:  Blood     Troponin I [[id number]]  (Normal) Collected:  01/20/20 1522    Lab Status:  Final result Specimen:  Blood from Arm, Right Updated:  01/20/20 1550     Troponin I <0 02 ng/mL     D-Dimer [[id number]]  (Abnormal) Collected:  01/20/20 1105    Lab Status:  Final result Specimen:  Blood from Arm, Right Updated:  01/20/20 1143     D-Dimer, Quant 0 96 ug/ml FEU     Troponin I [[id number]]  (Normal) Collected:  01/20/20 1053    Lab Status:  Final result Specimen:  Blood from Arm, Right Updated:  01/20/20 1120     Troponin I <0 02 ng/mL     Comprehensive metabolic panel [[id number]]  (Abnormal) Collected:  01/20/20 1053    Lab Status:  Final result Specimen:  Blood from Arm, Right Updated:  01/20/20 1120     Sodium 132 mmol/L      Potassium 3 6 mmol/L      Chloride 98 mmol/L      CO2 26 mmol/L      ANION GAP 8 mmol/L      BUN 7 mg/dL      Creatinine 0 77 mg/dL      Glucose 92 mg/dL      Calcium 9 7 mg/dL      AST 76 U/L       U/L      Alkaline Phosphatase 92 U/L      Total Protein 8 0 g/dL      Albumin 4 3 g/dL      Total Bilirubin 1 33 mg/dL      eGFR 98 ml/min/1 73sq m Narrative:       [employer] guidelines for Chronic Kidney Disease (CKD):     Stage 1 with normal or high GFR (GFR > 90 mL/min/1 73 square meters)    Stage 2 Mild CKD (GFR = 60-89 mL/min/1 73 square meters)    Stage 3A Moderate CKD (GFR = 45-59 mL/min/1 73 square meters)    Stage 3B Moderate CKD (GFR = 30-44 mL/min/1 73 square meters)    Stage 4 Severe CKD (GFR = 15-29 mL/min/1 73 square meters)    Stage 5 End Stage CKD (GFR <15 mL/min/1 73 square meters)  Note: GFR calculation is accurate only with a steady state creatinine    CBC and differential [[id number]]  (Abnormal) Collected:  01/20/20 1053    Lab Status:  Final result Specimen:  Blood from Arm, Right Updated:  01/20/20 1100     WBC 12 95 Thousand/uL      RBC 4 12 Million/uL      Hemoglobin 12 7 g/dL      Hematocrit 35 6 %      MCV 86 fL      MCH 30 8 pg      MCHC 35 7 g/dL      RDW 14 3 %      MPV 10 4 fL      Platelets 270 Thousands/uL      nRBC 0 /100 WBCs      Neutrophils Relative 75 %      Immat GRANS % 0 %      Lymphocytes Relative 13 %      Monocytes Relative 10 %      Eosinophils Relative 1 %      Basophils Relative 1 %      Neutrophils Absolute 9 80 Thousands/µL      Immature Grans Absolute 0 05 Thousand/uL      Lymphocytes Absolute 1 73 Thousands/µL      Monocytes Absolute 1 24 Thousand/µL      Eosinophils Absolute 0 07 Thousand/µL      Basophils Absolute 0 06 Thousands/µL                  CTA ED chest PE study   Final Result by Valeriano Barnes MD (01/20 7973)         1  No pulmonary embolism  2   Subcentimeter pulmonary nodules, largest measuring 4 mm  Based on current Fleischner Society 2017 Guidelines on incidental pulmonary nodule, because the patient is considered high risk for lung cancer, 12 month follow-up non-contrast chest CT is    recommended  The study was marked in EPIC for significant notification        Workstation performed: [id number]         XR chest 2 views   ED Interpretation by Yash Williamson Bharat Benjamin MD (01/20 1330)   No acute cardiopulmonary process  Final Result by Chip Gallagher MD (01/20 7418)      No acute cardiopulmonary disease  Workstation performed: [id number]         US right upper quadrant    (Results Pending)         Procedures  Procedures      ED Course  ED Course as of Jan 21 0724 Mon Jan 20, 2020   1308 EKG reviewed by myself reveals a normal sinus rhythm with normal DC, QRS, and QT intervals  There are slight depressions in leads 2 and AVF which appear new from previous EKG  In the T-wave inversions laterally on the previous ECG are no longer present  E[place] 108 pulmonary nodules, largest measuring 4 mm  Based on current Fleischner Society 2017 Guidelines on incidental pulmonary nodule, because the patient is considered high risk for lung cancer, 12 month follow-up non-contrast chest CT is   recommended  CTA ED chest PE study   1510 On reassessment patient reports her pain is improved though returns whenever she moves her right upper extremity or coughs  Continues with tenderness over the right chest wall  Informed her of her incidental CT scan findings and instructed her to follow up with her primary care physician that she will likely need a repeat CT scan within the next 12 months  At this time I think there is a very low likelihood of ACS and that this is most likely a musculoskeletal chest wall pain from her chronic cough  Will treat her symptomatically  Will check a delta troponin and EKG to confirm prior to discharge  Will encourage close follow-up with primary care physician  1618 Repeat EKG shows T-wave changes in the inferior leads compared to the previous EKG from today  Delta troponin is negative, however given these changes and concern for possible ACS  This also may be secondary to the patient's hypertension that she has had intermittently while in the emergency department    Discussed with the SOD team who agreed to admit the patient  HEART Risk Score      Most Recent Value   History  0 Filed at: 01/20/2020 1240   ECG  2 Filed at: 01/20/2020 1240   Age  1 Filed at: 01/20/2020 1240   Risk Factors  2 Filed at: 01/20/2020 1240   Troponin  0 Filed at: 01/20/2020 1240   Heart Score Risk Calculator   History  0 Filed at: 01/20/2020 1240   ECG  2 Filed at: 01/20/2020 1240   Age  1 Filed at: 01/20/2020 1240   Risk Factors  2 Filed at: 01/20/2020 1240   Troponin  0 Filed at: 01/20/2020 1240   HEART Score  5 Filed at: 01/20/2020 1240   HEART Score  5 Filed at: 01/20/2020 1240                      Wells' Criteria for PE      Most Recent Value   Wells' Criteria for PE   Clinical signs and symptoms of DVT  0 Filed at: 01/20/2020 1236   PE is primary diagnosis or equally likely  0 Filed at: 01/20/2020 1236   HR >100  1 5 Filed at: 01/20/2020 1236   Immobilization at least 3 days or Surgery in the previous 4 weeks  0 Filed at: 01/20/2020 1236   Previous, objectively diagnosed PE or DVT  0 Filed at: 01/20/2020 1236   Hemoptysis  0 Filed at: 01/20/2020 1236   Malignancy with treatment within 6 months or palliative  0 Filed at: 01/20/2020 1236   Wells' Criteria Total  1 5 Filed at: 01/20/2020 1236            MDM  Number of Diagnoses or Management Options  Abnormal EKG:   Chest wall pain:   Lung nodule:   Diagnosis management comments: 54-year-old female presenting emergency department for evaluation of right-sided chest pain for the last 2 and days  Presenting also complaining of worsening of chronic cough and shortness of breath  Initially evaluated for PE which was negative after a CTA  No signs of lung parenchymal disease causing her chest pain  Pain appears to be musculoskeletal in nature and she had improvement with pain medications in the emergency department, however on a delta EKG she did have changes in the inferior leads concerning for ischemia    Though her pain appears to be musculoskeletal her blood pressure had been on controlled in the emergency department and this may be causing some mild ischemic changes in her EKG  The patient currently is getting new insurance and has not seen a primary care physician in some time  I discussed with the SOD team who agreed to admit the patient for initial blood pressure control and continued follow-up after admission  Disposition  Final diagnoses:   Lung nodule - Subcentimeter pulmonary nodules, largest measuring 4 mm  Based on current Fleischner Society 2017 Guidelines on incidental pulmonary nodule, because the patient is considered high risk for lung cancer, 12 month follow-up non-contrast chest CT is   Chest wall pain   Abnormal EKG     Time reflects when diagnosis was documented in both MDM as applicable and the Disposition within this note     Time User Action Codes Description Comment    1/20/2020  2:58 PM Jeral Stamp Add [R91 1] Lung nodule     1/20/2020  2:58 PM Conchis Montes De Oca Modify [R91 1] Lung nodule Subcentimeter pulmonary nodules, largest measuring 4 mm  Based on current Fleischner Society 2017 Guidelines on incidental pulmonary nodule, because the patient is considered high risk for lung cancer, 12 month follow-up non-contrast chest CT is    1/20/2020  3:59 PM Jeral Stamp Add [R07 89] Chest wall pain     1/20/2020  4:14 PM Jeral Stamp Add [R94 31] Abnormal EKG       ED Disposition     ED Disposition Condition Date/Time Comment    Admit Stable Mon Jan 20, 2020  4:13 PM Case was discussed with SOD and the patient's admission status was agreed to be Admission Status: observation status to the service of Dr Brittany Diaz           Follow-up Information     Follow up With Specialties Details Why Contact Info    Tova Mullins MD Internal Medicine Schedule an appointment as soon as possible for a visit  Lung nodule followup [address]  [address], [facility] [address]  [phone]            Patient's Medications    No medications on file     No discharge procedures on file  ED Provider  Attending physically available and evaluated Adama Barry I managed the patient along with the ED Attending      Electronically Signed by         Talib Sanches MD  01/21/20 9760

## 2020-01-21 ENCOUNTER — APPOINTMENT (OUTPATIENT)
Dept: RADIOLOGY | Facility: HOSPITAL | Age: 60
End: 2020-01-21
Payer: COMMERCIAL

## 2020-01-21 VITALS
TEMPERATURE: 98.4 F | OXYGEN SATURATION: 100 % | RESPIRATION RATE: 18 BRPM | HEART RATE: 62 BPM | WEIGHT: 109.79 LBS | DIASTOLIC BLOOD PRESSURE: 80 MMHG | BODY MASS INDEX: 18.55 KG/M2 | SYSTOLIC BLOOD PRESSURE: 150 MMHG

## 2020-01-21 PROBLEM — I16.0 HYPERTENSIVE URGENCY: Status: RESOLVED | Noted: 2020-01-20 | Resolved: 2020-01-21

## 2020-01-21 LAB
ALBUMIN SERPL BCP-MCNC: 3.7 G/DL (ref 3.5–5)
ALP SERPL-CCNC: 79 U/L (ref 46–116)
ALT SERPL W P-5'-P-CCNC: 75 U/L (ref 12–78)
ANION GAP SERPL CALCULATED.3IONS-SCNC: 10 MMOL/L (ref 4–13)
AST SERPL W P-5'-P-CCNC: 47 U/L (ref 5–45)
ATRIAL RATE: 64 BPM
ATRIAL RATE: 67 BPM
BILIRUB SERPL-MCNC: 1.23 MG/DL (ref 0.2–1)
BUN SERPL-MCNC: 12 MG/DL (ref 5–25)
CALCIUM SERPL-MCNC: 8.5 MG/DL (ref 8.3–10.1)
CHLORIDE SERPL-SCNC: 95 MMOL/L (ref 100–108)
CHOLEST SERPL-MCNC: 255 MG/DL (ref 50–200)
CO2 SERPL-SCNC: 27 MMOL/L (ref 21–32)
CREAT SERPL-MCNC: 0.72 MG/DL (ref 0.6–1.3)
ERYTHROCYTE [DISTWIDTH] IN BLOOD BY AUTOMATED COUNT: 14 % (ref 11.6–15.1)
GFR SERPL CREATININE-BSD FRML MDRD: 106 ML/MIN/1.73SQ M
GLUCOSE SERPL-MCNC: 92 MG/DL (ref 65–140)
HBV CORE AB SER QL: REACTIVE
HBV CORE IGM SER QL: ABNORMAL
HBV SURFACE AG SER QL: ABNORMAL
HCT VFR BLD AUTO: 32.4 % (ref 34.8–46.1)
HCV AB SER QL: ABNORMAL
HDLC SERPL-MCNC: 155 MG/DL
HGB BLD-MCNC: 11.1 G/DL (ref 11.5–15.4)
LDLC SERPL CALC-MCNC: 86 MG/DL (ref 0–100)
MAGNESIUM SERPL-MCNC: 2.1 MG/DL (ref 1.6–2.6)
MCH RBC QN AUTO: 30.5 PG (ref 26.8–34.3)
MCHC RBC AUTO-ENTMCNC: 34.3 G/DL (ref 31.4–37.4)
MCV RBC AUTO: 89 FL (ref 82–98)
P AXIS: -15 DEGREES
P AXIS: 52 DEGREES
PHOSPHATE SERPL-MCNC: 4 MG/DL (ref 2.7–4.5)
PLATELET # BLD AUTO: 369 THOUSANDS/UL (ref 149–390)
PMV BLD AUTO: 10 FL (ref 8.9–12.7)
POTASSIUM SERPL-SCNC: 3.4 MMOL/L (ref 3.5–5.3)
PR INTERVAL: 140 MS
PR INTERVAL: 148 MS
PROT SERPL-MCNC: 7.3 G/DL (ref 6.4–8.2)
QRS AXIS: -10 DEGREES
QRS AXIS: 138 DEGREES
QRSD INTERVAL: 80 MS
QRSD INTERVAL: 82 MS
QT INTERVAL: 374 MS
QT INTERVAL: 392 MS
QTC INTERVAL: 395 MS
QTC INTERVAL: 404 MS
RBC # BLD AUTO: 3.64 MILLION/UL (ref 3.81–5.12)
SODIUM SERPL-SCNC: 132 MMOL/L (ref 136–145)
T WAVE AXIS: -22 DEGREES
T WAVE AXIS: 53 DEGREES
TRIGL SERPL-MCNC: 71 MG/DL
VENTRICULAR RATE: 64 BPM
VENTRICULAR RATE: 67 BPM
WBC # BLD AUTO: 9.47 THOUSAND/UL (ref 4.31–10.16)

## 2020-01-21 PROCEDURE — 76705 ECHO EXAM OF ABDOMEN: CPT

## 2020-01-21 PROCEDURE — 80053 COMPREHEN METABOLIC PANEL: CPT | Performed by: INTERNAL MEDICINE

## 2020-01-21 PROCEDURE — 83735 ASSAY OF MAGNESIUM: CPT | Performed by: INTERNAL MEDICINE

## 2020-01-21 PROCEDURE — 80061 LIPID PANEL: CPT | Performed by: INTERNAL MEDICINE

## 2020-01-21 PROCEDURE — 84100 ASSAY OF PHOSPHORUS: CPT | Performed by: INTERNAL MEDICINE

## 2020-01-21 PROCEDURE — 93005 ELECTROCARDIOGRAM TRACING: CPT

## 2020-01-21 PROCEDURE — 93010 ELECTROCARDIOGRAM REPORT: CPT | Performed by: INTERNAL MEDICINE

## 2020-01-21 PROCEDURE — 36415 COLL VENOUS BLD VENIPUNCTURE: CPT | Performed by: INTERNAL MEDICINE

## 2020-01-21 PROCEDURE — 85027 COMPLETE CBC AUTOMATED: CPT | Performed by: INTERNAL MEDICINE

## 2020-01-21 RX ORDER — HYDROCHLOROTHIAZIDE 25 MG/1
25 TABLET ORAL DAILY
Qty: 30 TABLET | Refills: 0 | Status: SHIPPED | OUTPATIENT
Start: 2020-01-21 | End: 2020-02-17 | Stop reason: SDUPTHER

## 2020-01-21 RX ORDER — ATORVASTATIN CALCIUM 20 MG/1
20 TABLET, FILM COATED ORAL
Qty: 30 TABLET | Refills: 0 | Status: SHIPPED | OUTPATIENT
Start: 2020-01-21 | End: 2020-02-17 | Stop reason: SDUPTHER

## 2020-01-21 RX ORDER — ATORVASTATIN CALCIUM 20 MG/1
20 TABLET, FILM COATED ORAL
Status: DISCONTINUED | OUTPATIENT
Start: 2020-01-21 | End: 2020-01-21 | Stop reason: HOSPADM

## 2020-01-21 RX ORDER — POTASSIUM CHLORIDE 20 MEQ/1
40 TABLET, EXTENDED RELEASE ORAL ONCE
Status: DISCONTINUED | OUTPATIENT
Start: 2020-01-21 | End: 2020-01-21 | Stop reason: HOSPADM

## 2020-01-21 RX ORDER — AMLODIPINE BESYLATE 10 MG/1
10 TABLET ORAL DAILY
Qty: 30 TABLET | Refills: 0 | Status: SHIPPED | OUTPATIENT
Start: 2020-01-22 | End: 2020-02-17 | Stop reason: SDUPTHER

## 2020-01-21 RX ADMIN — ENOXAPARIN SODIUM 40 MG: 40 INJECTION SUBCUTANEOUS at 11:44

## 2020-01-21 RX ADMIN — DICLOFENAC SODIUM 2 G: 10 GEL TOPICAL at 11:47

## 2020-01-21 RX ADMIN — AMLODIPINE BESYLATE 10 MG: 10 TABLET ORAL at 11:46

## 2020-01-21 RX ADMIN — CHLORTHALIDONE 50 MG: 25 TABLET ORAL at 11:46

## 2020-01-21 NOTE — DISCHARGE SUMMARY
SOD - DISCHARGE SUMMARY    Admitting Provider: Anastasia Whitlock MD  Discharge Provider: No att  providers found  Primary Care Physician at Discharge: Dr Donald Lee    Discharge To: Home  Admission Date: 1/20/2020     Discharge Date: 1/21/2020 11:58 AM  Primary Discharge Diagnosis  Principal Problem:    Musculoskeletal chest pain  Active Problems:    Smoker    HTN (hypertension)  Resolved Problems:    Hypertensive urgency      PATIENT INFORMATION      Patient: Greg Rodriguez 61 y o  female   MRN: [id number]  PCP: Liu Silva MD  Unit/Bed#: ED 17 Encounter: [id number]  Date Of Visit: 01/21/20  Current Length of Stay: 0 day(s)     ASSESSMENTS & PLAN        1  Hypertensive urgency  · Prescribed amlodipine 10 mg and HCTZ at discharge given [place] 4 dollar list  · Patient advised to check her blood pressures 1 to 2 times a day bring log  · Appointment with Dr Donald Lee, next week    2  Chest pain  · Likely musculoskeletal in nature given that is reproducible and worse with palpation and any are movement  · Given bottle of voltaren gel  · Hypertensive control as above  · Check echocardiogram as outpatient  · Ordered nuclear stress test (patient cannot get EKG shows given significant LVH)    Other  LVH:  Significant LVH noted on CTA that ruled out PE  Will do outpatient echocardiogram to reassess LVH  Likely in the setting of uncontrolled hypertension for many years  Patient will need to get insurance  Advised that we can help her at our clinic  See previous progress note for further assessment and plans            [address] [facility]      HPI     Per H+P by me "Greg Rodriguez is a 61 y o  female who presents with past medical history of hypertension come smoking presents for chest pain  Patient states for the past 2 days she has intermittent right-sided chest pain, 5/10 intensity, pressure-like in quality, intermittently radiating to the back    Pain is worse with arm movement, body torsion, cough, palpation  Alleviated with Toradol  No other new symptoms  Does have a chronic cough she attributes to smoking that is intermittently productive of white sputum  Denies all other review of symptoms  In the ED, vital signs showed systolic blood pressure 268 and diastolic blood pressure 365  Troponin was negative  Initial EKG was normal however repeat EKG showed T-wave flattening in the inferior leads  Labs are significant for WBC 12 95 , sodium 132, elevated LFTs with AST 76  "    [address] Course    Patient was admitted for ACS rule out and hypertensive urgency  ACS was ruled out with negative EKG/troponin and unremarkable telemetry  Her chest pain was likely due to a musculoskeletal etiology given was reproducible, worse with coughing and worse with arm movement  It was relieved with ketorolac  She was given Voltaren gel bottle at discharge  In terms of her hypertensive urgency, no evidence of emergency  She was given multiple doses of IV labetalol and hydralazine and started on p o  Amlodipine and chlorthalidone given that she is   Unfortunate, she does not have insurance so we switched to chlorthalidone to HCTZ  So she was discharged amlodipine 10 mg and HCTZ 25 mg daily  Initially the CT scan that was done to rule out PE did not comment on the heart however spoke with radiologist the following day who noted that there was significant LVH  Echocardiogram was ordered for outpatient  She was ordered stress test as well  This stress test has to be nuclear because she has significant LVH  Miscellaneous     Consulting Providers   None    Therapeutic Operative Procedures Performed  None     Diagnostic Procedures Performed  Xr Chest 2 Views    Result Date: 1/20/2020  Impression: No acute cardiopulmonary disease   Workstation performed: [id number]     Us Right Upper Quadrant    Result Date: 1/21/2020  Impression: No suspicious ultrasound findings in the right upper quadrant  Liver appears normal  Workstation performed: [id number]     Cta Ed Chest Pe Study    Result Date: 1/20/2020  Impression: 1  No pulmonary embolism  2   Subcentimeter pulmonary nodules, largest measuring 4 mm  Based on current Fleischner Society 2017 Guidelines on incidental pulmonary nodule, because the patient is considered high risk for lung cancer, 12 month follow-up non-contrast chest CT is recommended  The study was marked in EPIC for significant notification  Workstation performed: [id number]       Discharged With Lines: No  Test Results Pending at Discharge: ECHO and Stress Test    Outpatient Follow-Up  PCP    Code Status: Level 1 - Full Code    Medications   See after visit summary for reconciled discharge medications provided to patient and family  Allergies  Allergies   Allergen Reactions    Lisinopril      Discharge Diet:  DASH  Activity restrictions: none    Vitals:    01/21/20 1100   BP: 150/80   Pulse: 62   Resp: 18   Temp:    SpO2: 100%         Physical Exam:   GENERAL: NAD  HEENT:  NC/AT, PERRL, EOMI, MMM, no scleral icterus  CARDIAC:  RRR, +S1/S2, no S3/S4 heard, no m/g/r  PULMONARY:  CTA B/L, no wheezing/rales/rhonci, non-labored breathing  ABDOMEN:  Soft, NT/ND, +BS, no rebound/guarding/rigidity  Extremities:  2+ Pulses in DP/PT  No edema, cyanosis, or clubbing  NEUROLOGIC:  Alert/oriented x3  No motor or sensory deficits  SKIN:  No rashes or erythema        Discharge Condition: Stable      Discharge  Statement   I spent 30 minutes minutes discharging the patient  This time was spent on the day of discharge  I had direct contact with the patient on the day of discharge  Additional documentation is required if more than 30 minutes were spent on discharge

## 2020-02-05 ENCOUNTER — OFFICE VISIT (OUTPATIENT)
Dept: INTERNAL MEDICINE CLINIC | Facility: CLINIC | Age: 60
End: 2020-02-05
Payer: COMMERCIAL

## 2020-02-05 VITALS
DIASTOLIC BLOOD PRESSURE: 82 MMHG | TEMPERATURE: 97.6 F | HEIGHT: 63 IN | RESPIRATION RATE: 16 BRPM | SYSTOLIC BLOOD PRESSURE: 140 MMHG | BODY MASS INDEX: 19.67 KG/M2 | HEART RATE: 66 BPM | OXYGEN SATURATION: 99 % | WEIGHT: 111 LBS

## 2020-02-05 DIAGNOSIS — E78.5 HYPERLIPIDEMIA, UNSPECIFIED HYPERLIPIDEMIA TYPE: ICD-10-CM

## 2020-02-05 DIAGNOSIS — Z72.89 ALCOHOL USE: ICD-10-CM

## 2020-02-05 DIAGNOSIS — R74.8 ELEVATED LIVER ENZYMES: ICD-10-CM

## 2020-02-05 DIAGNOSIS — Z12.39 SCREENING FOR MALIGNANT NEOPLASM OF BREAST: ICD-10-CM

## 2020-02-05 DIAGNOSIS — Z72.0 TOBACCO ABUSE: ICD-10-CM

## 2020-02-05 DIAGNOSIS — R05.3 CHRONIC COUGH: ICD-10-CM

## 2020-02-05 DIAGNOSIS — R91.8 MULTIPLE LUNG NODULES ON CT: ICD-10-CM

## 2020-02-05 DIAGNOSIS — Z12.11 SCREENING FOR COLON CANCER: ICD-10-CM

## 2020-02-05 DIAGNOSIS — I10 ESSENTIAL HYPERTENSION: Primary | ICD-10-CM

## 2020-02-05 PROBLEM — Z78.9 ALCOHOL USE: Status: ACTIVE | Noted: 2020-02-05

## 2020-02-05 PROBLEM — F10.90 ALCOHOL USE: Status: ACTIVE | Noted: 2020-02-05

## 2020-02-05 PROCEDURE — 3008F BODY MASS INDEX DOCD: CPT | Performed by: NURSE PRACTITIONER

## 2020-02-05 PROCEDURE — 3077F SYST BP >= 140 MM HG: CPT | Performed by: NURSE PRACTITIONER

## 2020-02-05 PROCEDURE — 3079F DIAST BP 80-89 MM HG: CPT | Performed by: NURSE PRACTITIONER

## 2020-02-05 PROCEDURE — 99214 OFFICE O/P EST MOD 30 MIN: CPT | Performed by: NURSE PRACTITIONER

## 2020-02-05 PROCEDURE — 1111F DSCHRG MED/CURRENT MED MERGE: CPT | Performed by: NURSE PRACTITIONER

## 2020-02-05 PROCEDURE — 4004F PT TOBACCO SCREEN RCVD TLK: CPT | Performed by: NURSE PRACTITIONER

## 2020-02-05 NOTE — ASSESSMENT & PLAN NOTE
Not currently ready to quit though though she is aware of the risks and she says she has been cutting back  Offered NRT but pt not ready  We did discuss likely COPD diagnosis and the progressive nature of that course which would be worsened with continued smoking  Will continue to review and encourage cessation

## 2020-02-05 NOTE — ASSESSMENT & PLAN NOTE
Elevated cholesterol,  with normal LDL and trigs  Statins ordered at hospital, she has not yet started this because she did not have the money to buy it  Her calculated ASCVD risk is 13 6 and she should start this medication  She agrees to  medication and start  Will continue to monitor

## 2020-02-05 NOTE — ASSESSMENT & PLAN NOTE
Initially elevated at the start of the visit, rechecked personally and BP now reading 140/82  She was just restarted on her BP meds about 2 days ago, will continue current tx at present  She is not c/o chest pain, headache, blurred vision  She is scheduled for echo and stress test, will continue to monitor and reassess need for cardiology referral     Encouraged smoking cessation, cutting back etoh intake, and marijuana use

## 2020-02-05 NOTE — ASSESSMENT & PLAN NOTE
Will repeat CMP in 1 month  ETOH use possibly contributing, she also has a noted reactive hep b antibody  RUQ US done in hospital which was unremarkable  She is referred to GI for screening colonoscopy and would also like further evaluation/recommendations regarding this lab abnormality

## 2020-02-05 NOTE — PROGRESS NOTES
Assessment/Plan:    Essential hypertension  Initially elevated at the start of the visit, rechecked personally and BP now reading 140/82  She was just restarted on her BP meds about 2 days ago, will continue current tx at present  She is not c/o chest pain, headache, blurred vision  She is scheduled for echo and stress test, will continue to monitor and reassess need for cardiology referral     Encouraged smoking cessation, cutting back etoh intake, and marijuana use  Elevated liver enzymes  Will repeat CMP in 1 month  ETOH use possibly contributing, she also has a noted reactive hep b antibody  RUQ US done in hospital which was unremarkable  She is referred to GI for screening colonoscopy and would also like further evaluation/recommendations regarding this lab abnormality  Multiple lung nodules on CT  Incidental finding on CTA  Recommendation for f/u CT in 1 year, ordered at this time  Chronic cough  Chronic productive cough with 15 pack year history  CTA report shows findings consistent with emphysema  Formal PFT ordered to diagnose and stage  Tobacco abuse  Not currently ready to quit though though she is aware of the risks and she says she has been cutting back  Offered NRT but pt not ready  We did discuss likely COPD diagnosis and the progressive nature of that course which would be worsened with continued smoking  Will continue to review and encourage cessation  Alcohol use  Pt reports 2 beers per night, however it is noted in ER notes that her report was 6 beers per night  She does say she is trying to cut back which I encouraged  May be contributing to elevated liver enzymes  Hyperlipidemia  Elevated cholesterol,  with normal LDL and trigs  Statins ordered at hospital, she has not yet started this because she did not have the money to buy it  Her calculated ASCVD risk is 13 6 and she should start this medication  She agrees to  medication and start  Will continue to monitor  Diagnoses and all orders for this visit:    Essential hypertension    Multiple lung nodules on CT  -     CT chest wo contrast; Future    Chronic cough  -     Complete PFT with post bronchodilator; Future    Elevated liver enzymes  -     Ambulatory referral to Gastroenterology; Future  -     Comprehensive metabolic panel; Future    Screening for malignant neoplasm of breast  -     Mammo screening bilateral w cad; Future    Screening for colon cancer  -     Ambulatory referral to Gastroenterology; Future    Alcohol use    Tobacco abuse    Hyperlipidemia, unspecified hyperlipidemia type          Subjective:      Patient ID: Jl Pierson is a 61 y o  female  Pt is a 62 y/o female here today to reestablish care  She was previously a pt of Dr Maurice Rey, but she had no insurance for several years and was not getting medical care during that time period  PMH includes HTN, HLD, tobacco abuse, marijuana use, etoh use daily  Review of old records show history of LVH, diastolic dysfunction, atrial enlargement  She was seen in the ER on 1/20 with R sided chest pain  I reviewed notes from her course  Chest pain believed to be MSK with improvement with pain medications  Noted with very high blood pressure in the ED and EKG changes and she was admitted for observations for questionable ACS  CTA negative for PE but incidental findings include multiple pulmonary nodules and changes consistent with emphysema  Additional findings on labs noted include elevated liver enzymes and reactive hep B ab, elevated TSH,   She was restarted on oral antihypertensives and atorvastatin and d/c'd with outpatient f/u recommended in primary care  She was also ordered a stress test and echo          The following portions of the patient's history were reviewed and updated as appropriate: allergies, current medications, past family history, past medical history, past social history, past surgical history and problem list     Review of Systems   Constitutional: Negative for activity change, appetite change, chills, fatigue, fever and unexpected weight change  HENT: Negative for hearing loss  Eyes: Negative for visual disturbance  Respiratory: Positive for cough  Negative for chest tightness and shortness of breath  Cardiovascular: Negative for chest pain, palpitations and leg swelling  Gastrointestinal: Negative for abdominal pain, constipation, diarrhea, nausea and vomiting  Genitourinary: Negative for dysuria and frequency  Musculoskeletal: Negative for arthralgias and myalgias  Neurological: Negative for dizziness, weakness, numbness and headaches  Psychiatric/Behavioral: Negative for sleep disturbance  The patient is not nervous/anxious  Objective:      /82   Pulse 66   Temp 97 6 °F (36 4 °C)   Resp 16   Ht 5' 3" (1 6 m)   Wt 50 3 kg (111 lb)   SpO2 99%   BMI 19 66 kg/m²          Physical Exam   Constitutional: She is oriented to person, place, and time  Vital signs are normal  She appears well-developed and well-nourished  She is cooperative  HENT:   Right Ear: Hearing, tympanic membrane, external ear and ear canal normal    Left Ear: Hearing, tympanic membrane, external ear and ear canal normal    Nose: Nose normal  No mucosal edema  Mouth/Throat: Uvula is midline, oropharynx is clear and moist and mucous membranes are normal  Abnormal dentition (no upper teeth)  Eyes: Pupils are equal, round, and reactive to light  Conjunctivae and lids are normal    Neck: Normal carotid pulses and no JVD present  Carotid bruit is not present  No thyromegaly present  Cardiovascular: Normal rate, regular rhythm, normal heart sounds and intact distal pulses  No murmur heard  Pulmonary/Chest: Effort normal and breath sounds normal  No respiratory distress  Abdominal: Soft  Normal appearance and bowel sounds are normal  There is no tenderness     Musculoskeletal: Normal range of motion  She exhibits no edema  Lymphadenopathy:     She has no cervical adenopathy  Neurological: She is alert and oriented to person, place, and time  She has normal strength and normal reflexes  She displays normal reflexes  Skin: Skin is warm, dry and intact  Psychiatric: She has a normal mood and affect  Her speech is normal and behavior is normal  Judgment and thought content normal  Cognition and memory are normal    Vitals reviewed

## 2020-02-05 NOTE — ASSESSMENT & PLAN NOTE
Pt reports 2 beers per night, however it is noted in ER notes that her report was 6 beers per night  She does say she is trying to cut back which I encouraged  May be contributing to elevated liver enzymes

## 2020-02-05 NOTE — ASSESSMENT & PLAN NOTE
Chronic productive cough with 15 pack year history  CTA report shows findings consistent with emphysema  Formal PFT ordered to diagnose and stage

## 2020-02-17 DIAGNOSIS — I16.0 HYPERTENSIVE URGENCY: ICD-10-CM

## 2020-02-17 DIAGNOSIS — I25.10 ASCVD (ARTERIOSCLEROTIC CARDIOVASCULAR DISEASE): ICD-10-CM

## 2020-02-18 RX ORDER — HYDROCHLOROTHIAZIDE 25 MG/1
25 TABLET ORAL DAILY
Qty: 30 TABLET | Refills: 0 | Status: SHIPPED | OUTPATIENT
Start: 2020-02-18 | End: 2020-03-13

## 2020-02-18 RX ORDER — AMLODIPINE BESYLATE 10 MG/1
10 TABLET ORAL DAILY
Qty: 30 TABLET | Refills: 0 | Status: SHIPPED | OUTPATIENT
Start: 2020-02-18 | End: 2020-03-13

## 2020-02-18 RX ORDER — ATORVASTATIN CALCIUM 20 MG/1
20 TABLET, FILM COATED ORAL
Qty: 30 TABLET | Refills: 0 | Status: SHIPPED | OUTPATIENT
Start: 2020-02-18 | End: 2020-03-13

## 2020-02-19 ENCOUNTER — HOSPITAL ENCOUNTER (OUTPATIENT)
Dept: PULMONOLOGY | Facility: HOSPITAL | Age: 60
Discharge: HOME | End: 2020-02-19
Payer: COMMERCIAL

## 2020-02-19 DIAGNOSIS — R05.3 CHRONIC COUGH: ICD-10-CM

## 2020-02-19 PROCEDURE — 94729 DIFFUSING CAPACITY: CPT | Performed by: INTERNAL MEDICINE

## 2020-02-19 PROCEDURE — 94729 DIFFUSING CAPACITY: CPT

## 2020-02-19 PROCEDURE — 94760 N-INVAS EAR/PLS OXIMETRY 1: CPT

## 2020-02-19 PROCEDURE — 94060 EVALUATION OF WHEEZING: CPT | Performed by: INTERNAL MEDICINE

## 2020-02-19 PROCEDURE — 94060 EVALUATION OF WHEEZING: CPT

## 2020-02-19 PROCEDURE — 94726 PLETHYSMOGRAPHY LUNG VOLUMES: CPT

## 2020-02-19 PROCEDURE — 94726 PLETHYSMOGRAPHY LUNG VOLUMES: CPT | Performed by: INTERNAL MEDICINE

## 2020-02-19 RX ORDER — ALBUTEROL SULFATE 2.5 MG/3ML
2.5 SOLUTION RESPIRATORY (INHALATION) ONCE
Status: COMPLETED | OUTPATIENT
Start: 2020-02-19 | End: 2020-02-19

## 2020-02-19 RX ADMIN — ALBUTEROL SULFATE 2.5 MG: 2.5 SOLUTION RESPIRATORY (INHALATION) at 15:32

## 2020-02-19 NOTE — PROGRESS NOTES
Assessment/Plan:  NGE                                                                                                               BCM- RAMON   Co- Testing q 5 yrs  - '25 - Addendum: ASCUS-  ASCCP guidelines recommend re CoTesting in 3 yrs , not 5 - '23  Early menopause 42 , tobacco use - BMD                                                                             RTO 1 yr  SBE monthly  3 D Mammography   Colonoscopy-  Never, has a referral for this now  Exercise 3/wk - inadequate                 Calcium 1,000 mg/d with Vit D - inadequate, recommend supplements  Diagnoses and all orders for this visit:    Encounter for annual routine gynecological examination  -     Liquid-based pap, screening    Screening for cervical cancer  -     Liquid-based pap, screening    Encounter for screening mammogram for breast cancer  -     Mammo screening bilateral w 3d & cad; Future    Tobacco abuse  -     DXA bone density spine hip and pelvis; Future    Early menopause occurring in patient age younger than 39 years  -     DXA bone density spine hip and pelvis; Future              Subjective:        Patient ID: Jana Nguyen is a 61 y o  female  Mrs Scot Zuñiga presents today for annual visit  She has no complaints  She entered menopause at age 43  She has never been on hormone replacement therapy  She denies any recent bleeding  The following portions of the patient's history were reviewed and updated as appropriate: She  has a past medical history of Hypertension, Shingles, and Smoking 1/2 pack a day or less    Patient Active Problem List    Diagnosis Date Noted    Early menopause occurring in patient age younger than 39 years 02/20/2020    Chronic cough 02/05/2020    Elevated liver enzymes 02/05/2020    Multiple lung nodules on CT 02/05/2020    Alcohol use 02/05/2020    Hyperlipidemia 02/05/2020    Musculoskeletal chest pain 01/20/2020    Tobacco abuse 01/20/2020    Essential hypertension 01/20/2020   PMH:  Menarche 16  G3, P3; SAVD x 3, M's '77, '84, and '88-  8-6, 7-6, and 5-5  T[place] Abuse  Menopause 42  HTN '05  SOB 1/20 ED- lung nodules on CT, repeat study for 6 months, also has PFTs  She  has a past surgical history that includes Breast cyst excision (Left)  Her family history includes Alcohol abuse in her father; Diabetes in her brother, brother, mother, and sister; Hypertension in her sister; Substance Abuse in her sister  FH:  F- d 50, alcoholic cirrhosis  M- recovered alcoholic for 30 years, DM, amputation, eventually dying in a nursing home 66's  3 Sisters- 2 with HTN, 1 of whom has DM; 1 S murdered  2 Bros- oldest Blind, incarcerated, younger DM, paraplegic from a MVA  Son- murdered 21, gunshot  Denies that she or any of her siblings are alcoholics  She  reports that she has been smoking cigarettes  She has a 12 50 pack-year smoking history  She has never used smokeless tobacco  She reports that she drinks about 2 0 standard drinks of alcohol per week  She reports that she has current or past drug history  Drug: Marijuana  Frequency: 2 00 times per week  SH:  to [employer] '02  She is the assistant  at [employer]  Current Outpatient Medications   Medication Sig Dispense Refill    amLODIPine (NORVASC) 10 mg tablet Take 1 tablet (10 mg total) by mouth daily 30 tablet 0    atorvastatin (LIPITOR) 20 mg tablet Take 1 tablet (20 mg total) by mouth daily with dinner 30 tablet 0    hydrochlorothiazide (HYDRODIURIL) 25 mg tablet Take 1 tablet (25 mg total) by mouth daily 30 tablet 0     No current facility-administered medications for this visit        Current Outpatient Medications on File Prior to Visit   Medication Sig    amLODIPine (NORVASC) 10 mg tablet Take 1 tablet (10 mg total) by mouth daily    atorvastatin (LIPITOR) 20 mg tablet Take 1 tablet (20 mg total) by mouth daily with dinner    hydrochlorothiazide (HYDRODIURIL) 25 mg tablet Take 1 tablet (25 mg total) by mouth daily     Current Facility-Administered Medications on File Prior to Visit   Medication    [COMPLETED] albuterol inhalation solution 2 5 mg     She is allergic to lisinopril       Review of Systems   Constitutional: Negative for activity change, appetite change, fatigue and unexpected weight change  Eyes: Negative for visual disturbance  Respiratory: Negative for cough, chest tightness, shortness of breath and wheezing  Cardiovascular: Negative for chest pain, palpitations and leg swelling  Breast: Patient denies tenderness, nipple discharge, masses, or erythema  Gastrointestinal: Negative for abdominal distention, abdominal pain, blood in stool, constipation, diarrhea, nausea and vomiting  Endocrine: Negative for cold intolerance and heat intolerance  Genitourinary: Negative for decreased urine volume, difficulty urinating, dyspareunia, dysuria, frequency, hematuria, menstrual problem, pelvic pain, urgency, vaginal bleeding, vaginal discharge and vaginal pain  [place] twice a month without the need of a lubricant  Musculoskeletal: Negative for arthralgias  Skin: Negative for rash  Neurological: Negative for weakness, light-headedness, numbness and headaches  Hematological: Does not bruise/bleed easily  Psychiatric/Behavioral: Negative for agitation, behavioral problems and sleep disturbance  The patient is not nervous/anxious  Objective:    Vitals:    02/20/20 1011   Weight: 49 kg (108 lb)   Height: 5' 3" (1 6 m)            Physical Exam   Constitutional: She is oriented to person, place, and time  She appears well-developed and well-nourished  HENT:   Head: Normocephalic and atraumatic  Eyes: Pupils are equal, round, and reactive to light  Conjunctivae and EOM are normal    Neck: Normal range of motion  Neck supple  No tracheal deviation present   No thyromegaly present  Cardiovascular: Normal rate, regular rhythm and normal heart sounds  No murmur heard  Pulmonary/Chest: Effort normal and breath sounds normal  No respiratory distress  She has no wheezes  Right breast exhibits no inverted nipple, no mass, no nipple discharge, no skin change and no tenderness  Left breast exhibits no inverted nipple, no mass, no nipple discharge, no skin change and no tenderness  No breast tenderness, discharge or bleeding  Breasts are symmetrical    Abdominal: Soft  Bowel sounds are normal  She exhibits no distension and no mass  There is no tenderness  Genitourinary: Vagina normal and uterus normal  Rectal exam shows no external hemorrhoid  No breast tenderness, discharge or bleeding  There is no rash, tenderness or lesion on the right labia  There is no rash, tenderness or lesion on the left labia  Uterus is not deviated, not enlarged and not tender  Cervix exhibits no motion tenderness and no discharge  Right adnexum displays no mass, no tenderness and no fullness  Left adnexum displays no mass, no tenderness and no fullness  Genitourinary Comments: Urethral meatus within normal limits  Perineum within normal limits  Bladder well supported  Physiologic vaginal atrophy  The uterus is small  Musculoskeletal: Normal range of motion  Neurological: She is alert and oriented to person, place, and time  Skin: Skin is warm and dry  Psychiatric: She has a normal mood and affect  Her behavior is normal  Judgment and thought content normal    Nursing note and vitals reviewed

## 2020-02-20 ENCOUNTER — OFFICE VISIT (OUTPATIENT)
Dept: GYNECOLOGY | Facility: CLINIC | Age: 60
End: 2020-02-20
Payer: COMMERCIAL

## 2020-02-20 VITALS — WEIGHT: 108 LBS | BODY MASS INDEX: 19.14 KG/M2 | HEIGHT: 63 IN

## 2020-02-20 DIAGNOSIS — Z12.31 ENCOUNTER FOR SCREENING MAMMOGRAM FOR BREAST CANCER: ICD-10-CM

## 2020-02-20 DIAGNOSIS — Z72.0 TOBACCO ABUSE: ICD-10-CM

## 2020-02-20 DIAGNOSIS — Z01.419 ENCOUNTER FOR ANNUAL ROUTINE GYNECOLOGICAL EXAMINATION: Primary | ICD-10-CM

## 2020-02-20 DIAGNOSIS — Z12.4 SCREENING FOR CERVICAL CANCER: ICD-10-CM

## 2020-02-20 DIAGNOSIS — E28.319 EARLY MENOPAUSE OCCURRING IN PATIENT AGE YOUNGER THAN 45 YEARS: ICD-10-CM

## 2020-02-20 PROCEDURE — 3077F SYST BP >= 140 MM HG: CPT | Performed by: OBSTETRICS & GYNECOLOGY

## 2020-02-20 PROCEDURE — 87624 HPV HI-RISK TYP POOLED RSLT: CPT | Performed by: OBSTETRICS & GYNECOLOGY

## 2020-02-20 PROCEDURE — G0124 SCREEN C/V THIN LAYER BY MD: HCPCS | Performed by: PATHOLOGY

## 2020-02-20 PROCEDURE — 3079F DIAST BP 80-89 MM HG: CPT | Performed by: OBSTETRICS & GYNECOLOGY

## 2020-02-20 PROCEDURE — S0610 ANNUAL GYNECOLOGICAL EXAMINA: HCPCS | Performed by: OBSTETRICS & GYNECOLOGY

## 2020-02-20 PROCEDURE — 3008F BODY MASS INDEX DOCD: CPT | Performed by: OBSTETRICS & GYNECOLOGY

## 2020-02-20 PROCEDURE — G0145 SCR C/V CYTO,THINLAYER,RESCR: HCPCS | Performed by: PATHOLOGY

## 2020-02-21 LAB
HPV HR 12 DNA CVX QL NAA+PROBE: NEGATIVE
HPV16 DNA CVX QL NAA+PROBE: NEGATIVE
HPV18 DNA CVX QL NAA+PROBE: NEGATIVE

## 2020-02-25 ENCOUNTER — OFFICE VISIT (OUTPATIENT)
Dept: INTERNAL MEDICINE CLINIC | Facility: CLINIC | Age: 60
End: 2020-02-25
Payer: COMMERCIAL

## 2020-02-25 ENCOUNTER — TELEPHONE (OUTPATIENT)
Dept: INTERNAL MEDICINE CLINIC | Facility: CLINIC | Age: 60
End: 2020-02-25

## 2020-02-25 VITALS
BODY MASS INDEX: 19.84 KG/M2 | HEART RATE: 77 BPM | WEIGHT: 112 LBS | SYSTOLIC BLOOD PRESSURE: 142 MMHG | TEMPERATURE: 97.8 F | OXYGEN SATURATION: 100 % | RESPIRATION RATE: 16 BRPM | DIASTOLIC BLOOD PRESSURE: 80 MMHG | HEIGHT: 63 IN

## 2020-02-25 DIAGNOSIS — G44.209 ACUTE NON INTRACTABLE TENSION-TYPE HEADACHE: ICD-10-CM

## 2020-02-25 DIAGNOSIS — I10 ESSENTIAL HYPERTENSION: Primary | ICD-10-CM

## 2020-02-25 PROCEDURE — 3077F SYST BP >= 140 MM HG: CPT | Performed by: NURSE PRACTITIONER

## 2020-02-25 PROCEDURE — 3079F DIAST BP 80-89 MM HG: CPT | Performed by: NURSE PRACTITIONER

## 2020-02-25 PROCEDURE — 4004F PT TOBACCO SCREEN RCVD TLK: CPT | Performed by: NURSE PRACTITIONER

## 2020-02-25 PROCEDURE — 3008F BODY MASS INDEX DOCD: CPT | Performed by: NURSE PRACTITIONER

## 2020-02-25 PROCEDURE — 99213 OFFICE O/P EST LOW 20 MIN: CPT | Performed by: NURSE PRACTITIONER

## 2020-02-25 NOTE — TELEPHONE ENCOUNTER
Pt would like a call back today for some advice and possible a work note  She can be reached at [phone]

## 2020-02-25 NOTE — TELEPHONE ENCOUNTER
I spoke with pt  She just wants to come in for an appointment  I told her to call the office back to get her appointment made  She needs to be seen before Friday

## 2020-02-25 NOTE — ASSESSMENT & PLAN NOTE
Mild tension type headache  Exam is unremarkable  She has not taken any otc medications, she is advised to try ibuprofen, cool compress    Pt instructed to call for reevaluation if sx worsen or persist

## 2020-02-25 NOTE — ASSESSMENT & PLAN NOTE
BP is stable, still mildly elevated  She is taking amlodipine and hctz, both at night  She complains about waking to urinate overnight and I explained that she should be taking her medications in the morning to avoid this  She is still to complete echo and stress test and f/u will be in early March  Continue to encourage smoking, marijuana, and etoh use

## 2020-02-25 NOTE — PROGRESS NOTES
Assessment/Plan:    Essential hypertension  BP is stable, still mildly elevated  She is taking amlodipine and hctz, both at night  She complains about waking to urinate overnight and I explained that she should be taking her medications in the morning to avoid this  She is still to complete echo and stress test and f/u will be in early March  Continue to encourage smoking, marijuana, and etoh use  Acute non intractable tension-type headache  Mild tension type headache  Exam is unremarkable  She has not taken any otc medications, she is advised to try ibuprofen, cool compress  Pt instructed to call for reevaluation if sx worsen or persist          Diagnoses and all orders for this visit:    Essential hypertension    Acute non intractable tension-type headache          Subjective:      Patient ID: Guy Saini is a 61 y o  female  Pt is a 61 y o  y/o female who is seen today for evaluation of headache which started this morning  She complains of occasional lightheadedness and decreased appetite  No nausea or vomiting  She does have a baseline cough, this is not currently worse than baseline  She has been having sinus congestion and white nasal discharge for the past 3 months and this is not getting worse  She has not taken anything for her symptoms  The following portions of the patient's history were reviewed and updated as appropriate: allergies, current medications, past family history, past medical history, past social history, past surgical history and problem list     Review of Systems   Constitutional: Positive for appetite change and diaphoresis  Negative for chills, fatigue and fever  HENT: Positive for congestion and rhinorrhea  Negative for ear pain, postnasal drip, sinus pressure and sore throat  Respiratory: Positive for cough  Negative for chest tightness, shortness of breath and wheezing  Cardiovascular: Negative for chest pain, palpitations and leg swelling  Gastrointestinal: Negative for diarrhea, nausea and vomiting  Genitourinary: Negative for dysuria  Musculoskeletal: Negative for myalgias  Neurological: Positive for light-headedness and headaches  Negative for dizziness  Hematological: Negative for adenopathy  Psychiatric/Behavioral: Negative for sleep disturbance  Objective:      /80   Pulse 77   Temp 97 8 °F (36 6 °C)   Resp 16   Ht 5' 3" (1 6 m)   Wt 50 8 kg (112 lb)   LMP  (LMP Unknown)   SpO2 100%   BMI 19 84 kg/m²          Physical Exam   Constitutional: She is oriented to person, place, and time  She appears well-developed and well-nourished  She is cooperative  HENT:   Right Ear: Hearing, tympanic membrane, external ear and ear canal normal  Tympanic membrane is not bulging  No middle ear effusion  Left Ear: Hearing, tympanic membrane, external ear and ear canal normal  Tympanic membrane is not bulging  No middle ear effusion  Nose: Mucosal edema and rhinorrhea present  Mouth/Throat: Mucous membranes are not dry  No oropharyngeal exudate, posterior oropharyngeal edema, posterior oropharyngeal erythema or tonsillar abscesses  Eyes: Conjunctivae and lids are normal    Cardiovascular: Normal rate, regular rhythm, normal heart sounds and intact distal pulses  No murmur heard  Pulmonary/Chest: Effort normal and breath sounds normal  No accessory muscle usage  No respiratory distress  She has no decreased breath sounds  She has no wheezes  She has no rhonchi  She has no rales  Musculoskeletal: She exhibits no edema  Lymphadenopathy:        Head (right side): No submental, no submandibular, no tonsillar, no preauricular, no posterior auricular and no occipital adenopathy present  Head (left side): No submental, no submandibular, no tonsillar, no preauricular, no posterior auricular and no occipital adenopathy present  She has no cervical adenopathy     Neurological: She is alert and oriented to person, place, and time  She has normal strength  No sensory deficit  Skin: Skin is warm, dry and intact  Psychiatric: She has a normal mood and affect  Her speech is normal and behavior is normal  Judgment and thought content normal  Cognition and memory are normal    Vitals reviewed

## 2020-02-25 NOTE — LETTER
February 25, 2020     Patient: Guy Saini   YOB: 1960   Date of Visit: 2/25/2020       To Whom it May Concern:    Guy Saini is under my professional care  She was seen in my office on 2/25/2020  She may return to work on 2/28/20  If you have any questions or concerns, please don't hesitate to call           Sincerely,          THONG Grady        CC: No Recipients

## 2020-02-26 LAB
LAB AP GYN PRIMARY INTERPRETATION: ABNORMAL
Lab: ABNORMAL
PATH INTERP SPEC-IMP: ABNORMAL

## 2020-03-02 ENCOUNTER — HOSPITAL ENCOUNTER (OUTPATIENT)
Dept: NON INVASIVE DIAGNOSTICS | Facility: CLINIC | Age: 60
Discharge: HOME | End: 2020-03-02
Payer: COMMERCIAL

## 2020-03-02 DIAGNOSIS — I10 HTN (HYPERTENSION): ICD-10-CM

## 2020-03-02 DIAGNOSIS — R07.89 CHEST WALL PAIN: ICD-10-CM

## 2020-03-02 PROCEDURE — 93018 CV STRESS TEST I&R ONLY: CPT | Performed by: INTERNAL MEDICINE

## 2020-03-02 PROCEDURE — 93016 CV STRESS TEST SUPVJ ONLY: CPT | Performed by: INTERNAL MEDICINE

## 2020-03-02 PROCEDURE — 93306 TTE W/DOPPLER COMPLETE: CPT | Performed by: INTERNAL MEDICINE

## 2020-03-02 PROCEDURE — A9502 TC99M TETROFOSMIN: HCPCS

## 2020-03-02 PROCEDURE — 93017 CV STRESS TEST TRACING ONLY: CPT

## 2020-03-02 PROCEDURE — 78452 HT MUSCLE IMAGE SPECT MULT: CPT

## 2020-03-02 PROCEDURE — 78452 HT MUSCLE IMAGE SPECT MULT: CPT | Performed by: INTERNAL MEDICINE

## 2020-03-02 PROCEDURE — 93306 TTE W/DOPPLER COMPLETE: CPT

## 2020-03-02 RX ADMIN — REGADENOSON 0.4 MG: 0.08 INJECTION, SOLUTION INTRAVENOUS at 14:10

## 2020-03-04 ENCOUNTER — APPOINTMENT (OUTPATIENT)
Dept: LAB | Facility: HOSPITAL | Age: 60
End: 2020-03-04
Payer: COMMERCIAL

## 2020-03-04 DIAGNOSIS — R74.8 ELEVATED LIVER ENZYMES: ICD-10-CM

## 2020-03-04 LAB
ALBUMIN SERPL BCP-MCNC: 3.6 G/DL (ref 3.5–5)
ALP SERPL-CCNC: 78 U/L (ref 46–116)
ALT SERPL W P-5'-P-CCNC: 21 U/L (ref 12–78)
ANION GAP SERPL CALCULATED.3IONS-SCNC: 6 MMOL/L (ref 4–13)
AST SERPL W P-5'-P-CCNC: 24 U/L (ref 5–45)
BILIRUB SERPL-MCNC: 0.24 MG/DL (ref 0.2–1)
BUN SERPL-MCNC: 12 MG/DL (ref 5–25)
CALCIUM SERPL-MCNC: 8.3 MG/DL (ref 8.3–10.1)
CHLORIDE SERPL-SCNC: 103 MMOL/L (ref 100–108)
CO2 SERPL-SCNC: 28 MMOL/L (ref 21–32)
CREAT SERPL-MCNC: 0.67 MG/DL (ref 0.6–1.3)
GFR SERPL CREATININE-BSD FRML MDRD: 111 ML/MIN/1.73SQ M
GLUCOSE P FAST SERPL-MCNC: 83 MG/DL (ref 65–99)
POTASSIUM SERPL-SCNC: 3.7 MMOL/L (ref 3.5–5.3)
PROT SERPL-MCNC: 6.9 G/DL (ref 6.4–8.2)
SODIUM SERPL-SCNC: 137 MMOL/L (ref 136–145)

## 2020-03-04 PROCEDURE — 36415 COLL VENOUS BLD VENIPUNCTURE: CPT

## 2020-03-04 PROCEDURE — 80053 COMPREHEN METABOLIC PANEL: CPT

## 2020-03-09 LAB
CHEST PAIN STATEMENT: NORMAL
MAX DIASTOLIC BP: 72 MMHG
MAX HEART RATE: 117 BPM
MAX PREDICTED HEART RATE: 161 BPM
MAX. SYSTOLIC BP: 140 MMHG
PROTOCOL NAME: NORMAL
REASON FOR TERMINATION: NORMAL
TARGET HR FORMULA: NORMAL
TEST INDICATION: NORMAL
TIME IN EXERCISE PHASE: NORMAL

## 2020-03-13 DIAGNOSIS — I16.0 HYPERTENSIVE URGENCY: ICD-10-CM

## 2020-03-13 DIAGNOSIS — I25.10 ASCVD (ARTERIOSCLEROTIC CARDIOVASCULAR DISEASE): ICD-10-CM

## 2020-03-13 RX ORDER — AMLODIPINE BESYLATE 10 MG/1
TABLET ORAL
Qty: 90 TABLET | Refills: 0 | Status: SHIPPED | OUTPATIENT
Start: 2020-03-13 | End: 2020-06-26

## 2020-03-13 RX ORDER — HYDROCHLOROTHIAZIDE 25 MG/1
TABLET ORAL
Qty: 30 TABLET | Refills: 0 | Status: SHIPPED | OUTPATIENT
Start: 2020-03-13 | End: 2020-04-09

## 2020-03-13 RX ORDER — ATORVASTATIN CALCIUM 20 MG/1
TABLET, FILM COATED ORAL
Qty: 30 TABLET | Refills: 0 | Status: SHIPPED | OUTPATIENT
Start: 2020-03-13 | End: 2020-04-09

## 2020-04-09 DIAGNOSIS — I16.0 HYPERTENSIVE URGENCY: ICD-10-CM

## 2020-04-09 DIAGNOSIS — I25.10 ASCVD (ARTERIOSCLEROTIC CARDIOVASCULAR DISEASE): ICD-10-CM

## 2020-04-09 RX ORDER — HYDROCHLOROTHIAZIDE 25 MG/1
TABLET ORAL
Qty: 30 TABLET | Refills: 0 | Status: SHIPPED | OUTPATIENT
Start: 2020-04-09 | End: 2020-05-18

## 2020-04-09 RX ORDER — ATORVASTATIN CALCIUM 20 MG/1
TABLET, FILM COATED ORAL
Qty: 30 TABLET | Refills: 0 | Status: SHIPPED | OUTPATIENT
Start: 2020-04-09 | End: 2020-05-18

## 2020-04-20 ENCOUNTER — TELEPHONE (OUTPATIENT)
Dept: OTHER | Facility: HOSPITAL | Age: 60
End: 2020-04-20

## 2020-05-18 DIAGNOSIS — I16.0 HYPERTENSIVE URGENCY: ICD-10-CM

## 2020-05-18 DIAGNOSIS — I25.10 ASCVD (ARTERIOSCLEROTIC CARDIOVASCULAR DISEASE): ICD-10-CM

## 2020-05-18 RX ORDER — HYDROCHLOROTHIAZIDE 25 MG/1
TABLET ORAL
Qty: 30 TABLET | Refills: 0 | Status: SHIPPED | OUTPATIENT
Start: 2020-05-18 | End: 2020-06-26

## 2020-05-18 RX ORDER — ATORVASTATIN CALCIUM 20 MG/1
TABLET, FILM COATED ORAL
Qty: 30 TABLET | Refills: 0 | Status: SHIPPED | OUTPATIENT
Start: 2020-05-18 | End: 2020-06-26

## 2020-05-22 ENCOUNTER — TRANSCRIBE ORDERS (OUTPATIENT)
Dept: ADMINISTRATIVE | Facility: HOSPITAL | Age: 60
End: 2020-05-22

## 2020-05-22 DIAGNOSIS — Z72.0 TOBACCO ABUSE: ICD-10-CM

## 2020-05-22 DIAGNOSIS — E28.319 PREMATURE MENOPAUSE: ICD-10-CM

## 2020-05-22 DIAGNOSIS — Z12.31 VISIT FOR SCREENING MAMMOGRAM: Primary | ICD-10-CM

## 2020-06-26 DIAGNOSIS — I25.10 ASCVD (ARTERIOSCLEROTIC CARDIOVASCULAR DISEASE): ICD-10-CM

## 2020-06-26 DIAGNOSIS — I16.0 HYPERTENSIVE URGENCY: ICD-10-CM

## 2020-06-26 RX ORDER — ATORVASTATIN CALCIUM 20 MG/1
TABLET, FILM COATED ORAL
Qty: 90 TABLET | Refills: 0 | Status: SHIPPED | OUTPATIENT
Start: 2020-06-26 | End: 2020-09-18

## 2020-06-26 RX ORDER — AMLODIPINE BESYLATE 10 MG/1
TABLET ORAL
Qty: 90 TABLET | Refills: 0 | Status: SHIPPED | OUTPATIENT
Start: 2020-06-26 | End: 2020-09-18

## 2020-06-26 RX ORDER — HYDROCHLOROTHIAZIDE 25 MG/1
TABLET ORAL
Qty: 90 TABLET | Refills: 0 | Status: SHIPPED | OUTPATIENT
Start: 2020-06-26 | End: 2020-09-18

## 2020-07-24 ENCOUNTER — HOSPITAL ENCOUNTER (OUTPATIENT)
Dept: RADIOLOGY | Age: 60
Discharge: HOME | End: 2020-07-24
Payer: COMMERCIAL

## 2020-07-24 VITALS — BODY MASS INDEX: 19.84 KG/M2 | HEIGHT: 63 IN | WEIGHT: 112 LBS

## 2020-07-24 DIAGNOSIS — E28.319 EARLY MENOPAUSE OCCURRING IN PATIENT AGE YOUNGER THAN 45 YEARS: ICD-10-CM

## 2020-07-24 DIAGNOSIS — Z12.31 ENCOUNTER FOR SCREENING MAMMOGRAM FOR BREAST CANCER: ICD-10-CM

## 2020-07-24 DIAGNOSIS — Z72.0 TOBACCO ABUSE: ICD-10-CM

## 2020-07-24 PROCEDURE — 77080 DXA BONE DENSITY AXIAL: CPT

## 2020-07-24 PROCEDURE — 77067 SCR MAMMO BI INCL CAD: CPT

## 2020-07-24 PROCEDURE — 77063 BREAST TOMOSYNTHESIS BI: CPT

## 2020-08-19 NOTE — PROGRESS NOTES
Assessment/Plan:  Osteoporosis of the spine T-3 3  LFN -2 2  Risk factors- premature menopause 42                     - smoking 10 cigarettes daily                     - alcohol, 12 beers per week                     - low BMI    Calcium with vitamin-D- 1200 mg a day  Weight-bearing exercise 3 times a week- suggested she go on 40 minutes walks with her  Wednesdays and Thursdays when she is off  He is retired and very active  Fosamax 70 mg weekly for 5 years  Recheck BMD after 1 year's use of Fosamax with calcium/Vit D supplementation  Smoking cessation  A 50% reduction in alcohol    A picture of the spine was used to facilitate her understanding    OSTEOPOROSIS:    Osteoporosis/penia was fully discussed including secondary causes, family history, estrogen deficiency and treatment  The importance of adequate calcium, vitamin D and weightbearing exercise was stressed  Bisphosphanates, Evista (SERM), Prolia, and Forteo were explained  This included proper administration, side effects, complications and the need to have your pharmacist check for possible drug interactions  At this time in a persons life it is common to be taking multiple medications  Having a counseling session with the pharmacist to review proper drug administration is recommended  The alternative of observation with continued deterioration of central and peripheral bone mass was discussed  The sequelae of compression and hip fractures was explained  Following a hip fracture 20% of patients die within a year and 50% never regain the level of independence they once had  Compression fractures are associated with chronic pain requiring narcotics and compromised lung function  She plans on quitting smoking on her own but will call if she thinks she needs a Nicoderm patch  I recommended she cut her alcohol consumption in half- 6 beers per week     She will purchase a generic version of Citracal and take 1 tablet twice a day to received 1200 mg of calcium  Fosamax was fully explained including the benefits and risks  She has not seen a dentist in 5 years  She has no upper teeth  I asked her to schedule a visit for routine care but also did let the dentist know that she is on a bisphosphonate and to discuss osteonecrosis of the jaw  She will last the pharmacist for help regarding the calcium and to check for drug interactions between her 3 other prescription medications  Fosamax was fully discussed including the benefits, risks, alternatives, and proper administration  Administration was reviewed a 2nd time to improve the chance of compliance  Secondary causes of osteoporosis were explained and laboratory data was ordered  Today's visit took 25 minutes all of which was spent on counseling  Diagnoses and all orders for this visit:    Localized osteoporosis without current pathological fracture  -     Alkaline phosphatase; Future  -     Calcium; Future  -     CBC; Future  -     Creatinine, serum; Future  -     Phosphorus; Future  -     Protein electrophoresis, serum; Future  -     PTH, intact; Future  -     TSH, 3rd generation with Free T4 reflex; Future  -     Vitamin D 25 hydroxy; Future  -     alendronate (FOSAMAX) 70 mg tablet; Take 1 tablet (70 mg total) by mouth every 7 days    Tobacco abuse    Early menopause occurring in patient age younger than 45 years              Subjective:        Patient ID: Wilmer Moeller is a 61 y o  female  Mari Pugh reports today to discuss osteoporosis which was diagnosed on a recent BMD   She is alarmed and at times tearful  I reassured her  Risk factors for osteoporosis includes premature menopause at age 43, cigarette smoking, alcohol use, and low BMI  She is active at work but when she comes home she cooks and does housework and is too tired to exercise  She is off Wednesdays and Thursdays  Her  is retired  Her ingestion of calcium was poor prior to her last visit     She smokes a half a pack of cigarettes a day but is trying to quit  She has 12 beers per week  There is no family history of uterine or breast cancer  The following portions of the patient's history were reviewed and updated as appropriate: She  has a past medical history of Hypertension, Shingles, and Smoking 1/2 pack a day or less  Patient Active Problem List    Diagnosis Date Noted    Acute non intractable tension-type headache 02/25/2020    Early menopause occurring in patient age younger than 39 years 02/20/2020    Chronic cough 02/05/2020    Elevated liver enzymes 02/05/2020    Multiple lung nodules on CT 02/05/2020    Alcohol use 02/05/2020    Hyperlipidemia 02/05/2020    Musculoskeletal chest pain 01/20/2020    Tobacco abuse 01/20/2020    Essential hypertension 01/20/2020     She  has a past surgical history that includes Breast cyst excision (Left)  Her family history includes Alcohol abuse in her father; Diabetes in her brother, brother, mother, and sister; Hypertension in her sister; No Known Problems in her son, son, and son; Substance Abuse in her sister  She  reports that she has been smoking cigarettes  She has a 12 50 pack-year smoking history  She has never used smokeless tobacco  She reports current alcohol use of about 2 0 standard drinks of alcohol per week  She reports current drug use  Frequency: 2 00 times per week  Drug: Marijuana  Current Outpatient Medications   Medication Sig Dispense Refill    alendronate (FOSAMAX) 70 mg tablet Take 1 tablet (70 mg total) by mouth every 7 days 13 tablet 3    amLODIPine (NORVASC) 10 mg tablet take 1 tablet by mouth once daily 90 tablet 0    atorvastatin (LIPITOR) 20 mg tablet take 1 tablet by mouth daily with dinner 90 tablet 0    hydrochlorothiazide (HYDRODIURIL) 25 mg tablet take 1 tablet by mouth once daily 90 tablet 0     No current facility-administered medications for this visit        Current Outpatient Medications on File Prior to Visit   Medication Sig    amLODIPine (NORVASC) 10 mg tablet take 1 tablet by mouth once daily    atorvastatin (LIPITOR) 20 mg tablet take 1 tablet by mouth daily with dinner    hydrochlorothiazide (HYDRODIURIL) 25 mg tablet take 1 tablet by mouth once daily     No current facility-administered medications on file prior to visit  She is allergic to lisinopril       Review of Systems   Constitutional: Negative  Psychiatric/Behavioral: The patient is nervous/anxious  Objective:    Vitals:    08/20/20 1032   BP: 112/70   BP Location: Left arm   Patient Position: Sitting   Cuff Size: Standard   Temp: (!) 96 9 °F (36 1 °C)   Weight: 48 2 kg (106 lb 3 2 oz)   Height: 5' 3" (1 6 m)            Physical Exam  Vitals signs and nursing note reviewed  Constitutional:       General: She is not in acute distress  Appearance: Normal appearance  She is not ill-appearing  Neurological:      Mental Status: She is alert and oriented to person, place, and time  Psychiatric:         Mood and Affect: Mood normal          Behavior: Behavior normal          Thought Content:  Thought content normal          Judgment: Judgment normal

## 2020-08-20 ENCOUNTER — OFFICE VISIT (OUTPATIENT)
Dept: GYNECOLOGY | Facility: CLINIC | Age: 60
End: 2020-08-20
Payer: COMMERCIAL

## 2020-08-20 VITALS
WEIGHT: 106.2 LBS | SYSTOLIC BLOOD PRESSURE: 112 MMHG | TEMPERATURE: 96.9 F | BODY MASS INDEX: 18.82 KG/M2 | HEIGHT: 63 IN | DIASTOLIC BLOOD PRESSURE: 70 MMHG

## 2020-08-20 DIAGNOSIS — Z72.0 TOBACCO ABUSE: ICD-10-CM

## 2020-08-20 DIAGNOSIS — M81.6 LOCALIZED OSTEOPOROSIS WITHOUT CURRENT PATHOLOGICAL FRACTURE: Primary | ICD-10-CM

## 2020-08-20 DIAGNOSIS — E28.319 EARLY MENOPAUSE OCCURRING IN PATIENT AGE YOUNGER THAN 45 YEARS: ICD-10-CM

## 2020-08-20 PROCEDURE — 3008F BODY MASS INDEX DOCD: CPT | Performed by: OBSTETRICS & GYNECOLOGY

## 2020-08-20 PROCEDURE — 3078F DIAST BP <80 MM HG: CPT | Performed by: OBSTETRICS & GYNECOLOGY

## 2020-08-20 PROCEDURE — 99214 OFFICE O/P EST MOD 30 MIN: CPT | Performed by: OBSTETRICS & GYNECOLOGY

## 2020-08-20 PROCEDURE — 4004F PT TOBACCO SCREEN RCVD TLK: CPT | Performed by: OBSTETRICS & GYNECOLOGY

## 2020-08-20 PROCEDURE — 3074F SYST BP LT 130 MM HG: CPT | Performed by: OBSTETRICS & GYNECOLOGY

## 2020-08-20 RX ORDER — ALENDRONATE SODIUM 70 MG/1
70 TABLET ORAL
Qty: 13 TABLET | Refills: 3 | Status: SHIPPED | OUTPATIENT
Start: 2020-08-20 | End: 2021-03-23 | Stop reason: SDUPTHER

## 2020-09-03 ENCOUNTER — TELEPHONE (OUTPATIENT)
Dept: GYNECOLOGY | Facility: CLINIC | Age: 60
End: 2020-09-03

## 2020-09-03 NOTE — TELEPHONE ENCOUNTER
Started the medication you prescribed for osteoporosis  She passed out yesterday at work  She thinks she is having side effects from the medication and would like to speak to you  Please call

## 2020-09-03 NOTE — TELEPHONE ENCOUNTER
P     AM GRAYSON    I will try to call tomorrow  Syncope is not a side effect of Fosamax  I did review the potential side effects on the Fosamax web site and can review that with you tomorrow

## 2020-09-04 NOTE — TELEPHONE ENCOUNTER
P   Feeling much better today  Syncope occurred at work  She had taken Fosamax 5 days earlier  Discussed, I do not believe this is a side effect  She will take 2nd dose tomorrow

## 2020-09-18 DIAGNOSIS — I16.0 HYPERTENSIVE URGENCY: ICD-10-CM

## 2020-09-18 DIAGNOSIS — I25.10 ASCVD (ARTERIOSCLEROTIC CARDIOVASCULAR DISEASE): ICD-10-CM

## 2020-09-18 RX ORDER — HYDROCHLOROTHIAZIDE 25 MG/1
TABLET ORAL
Qty: 90 TABLET | Refills: 0 | Status: SHIPPED | OUTPATIENT
Start: 2020-09-18 | End: 2020-12-16

## 2020-09-18 RX ORDER — ATORVASTATIN CALCIUM 20 MG/1
TABLET, FILM COATED ORAL
Qty: 90 TABLET | Refills: 0 | Status: SHIPPED | OUTPATIENT
Start: 2020-09-18 | End: 2020-12-16

## 2020-09-18 RX ORDER — AMLODIPINE BESYLATE 10 MG/1
TABLET ORAL
Qty: 90 TABLET | Refills: 0 | Status: SHIPPED | OUTPATIENT
Start: 2020-09-18 | End: 2020-12-16

## 2020-09-28 ENCOUNTER — TELEPHONE (OUTPATIENT)
Dept: GYNECOLOGY | Facility: CLINIC | Age: 60
End: 2020-09-28

## 2020-09-30 ENCOUNTER — OFFICE VISIT (OUTPATIENT)
Dept: GASTROENTEROLOGY | Facility: CLINIC | Age: 60
End: 2020-09-30
Payer: COMMERCIAL

## 2020-09-30 ENCOUNTER — LAB (OUTPATIENT)
Dept: LAB | Facility: HOSPITAL | Age: 60
End: 2020-09-30
Attending: OBSTETRICS & GYNECOLOGY
Payer: COMMERCIAL

## 2020-09-30 VITALS
BODY MASS INDEX: 19.32 KG/M2 | WEIGHT: 105 LBS | SYSTOLIC BLOOD PRESSURE: 156 MMHG | DIASTOLIC BLOOD PRESSURE: 92 MMHG | TEMPERATURE: 98.4 F | HEIGHT: 62 IN | HEART RATE: 84 BPM

## 2020-09-30 DIAGNOSIS — R74.8 ELEVATED LIVER ENZYMES: ICD-10-CM

## 2020-09-30 DIAGNOSIS — Z12.11 SCREENING FOR COLON CANCER: ICD-10-CM

## 2020-09-30 DIAGNOSIS — M81.6 LOCALIZED OSTEOPOROSIS WITHOUT CURRENT PATHOLOGICAL FRACTURE: ICD-10-CM

## 2020-09-30 LAB
25(OH)D3 SERPL-MCNC: 17 NG/ML (ref 30–100)
ALP SERPL-CCNC: 81 U/L (ref 46–116)
CALCIUM SERPL-MCNC: 9.5 MG/DL (ref 8.3–10.1)
CREAT SERPL-MCNC: 0.59 MG/DL (ref 0.6–1.3)
ERYTHROCYTE [DISTWIDTH] IN BLOOD BY AUTOMATED COUNT: 14 % (ref 11.6–15.1)
GFR SERPL CREATININE-BSD FRML MDRD: 115 ML/MIN/1.73SQ M
HCT VFR BLD AUTO: 34 % (ref 34.8–46.1)
HGB BLD-MCNC: 11.7 G/DL (ref 11.5–15.4)
MCH RBC QN AUTO: 31.2 PG (ref 26.8–34.3)
MCHC RBC AUTO-ENTMCNC: 34.4 G/DL (ref 31.4–37.4)
MCV RBC AUTO: 91 FL (ref 82–98)
PHOSPHATE SERPL-MCNC: 3.7 MG/DL (ref 2.3–4.1)
PLATELET # BLD AUTO: 524 THOUSANDS/UL (ref 149–390)
PMV BLD AUTO: 9.8 FL (ref 8.9–12.7)
PTH-INTACT SERPL-MCNC: 40.5 PG/ML (ref 18.4–80.1)
RBC # BLD AUTO: 3.75 MILLION/UL (ref 3.81–5.12)
TSH SERPL DL<=0.05 MIU/L-ACNC: 2.7 UIU/ML (ref 0.36–3.74)
WBC # BLD AUTO: 8 THOUSAND/UL (ref 4.31–10.16)

## 2020-09-30 PROCEDURE — 99204 OFFICE O/P NEW MOD 45 MIN: CPT | Performed by: INTERNAL MEDICINE

## 2020-09-30 PROCEDURE — 84165 PROTEIN E-PHORESIS SERUM: CPT | Performed by: PATHOLOGY

## 2020-09-30 PROCEDURE — 84100 ASSAY OF PHOSPHORUS: CPT

## 2020-09-30 PROCEDURE — 36415 COLL VENOUS BLD VENIPUNCTURE: CPT

## 2020-09-30 PROCEDURE — 82310 ASSAY OF CALCIUM: CPT

## 2020-09-30 PROCEDURE — 4004F PT TOBACCO SCREEN RCVD TLK: CPT | Performed by: INTERNAL MEDICINE

## 2020-09-30 PROCEDURE — 84165 PROTEIN E-PHORESIS SERUM: CPT

## 2020-09-30 PROCEDURE — 85027 COMPLETE CBC AUTOMATED: CPT

## 2020-09-30 PROCEDURE — 84443 ASSAY THYROID STIM HORMONE: CPT

## 2020-09-30 PROCEDURE — 84075 ASSAY ALKALINE PHOSPHATASE: CPT

## 2020-09-30 PROCEDURE — 3077F SYST BP >= 140 MM HG: CPT | Performed by: INTERNAL MEDICINE

## 2020-09-30 PROCEDURE — 82306 VITAMIN D 25 HYDROXY: CPT

## 2020-09-30 PROCEDURE — 82565 ASSAY OF CREATININE: CPT

## 2020-09-30 PROCEDURE — 83970 ASSAY OF PARATHORMONE: CPT

## 2020-09-30 PROCEDURE — 3080F DIAST BP >= 90 MM HG: CPT | Performed by: INTERNAL MEDICINE

## 2020-09-30 NOTE — PATIENT INSTRUCTIONS
Colonoscopy scheduled for 01/07/20 with   [facility] during clinic block  Golytely instructions given by Moe CHAN in the office

## 2020-09-30 NOTE — PROGRESS NOTES
[facility] - Outpatient Consultation  Mukesh Fournier 61 y o  female MRN: [id number]  Encounter: [id number]          ASSESSMENT AND PLAN:      1  Elevated liver enzymes    LFTs normalize in March 2020 but AST was greater than ALT  Platelets remain normal based on labs earlier today  Previous exposure to hepatitis B but has cleared it  Hepatitis-C screen was negative  Likely etiology of liver lab abnormalities was alcohol abuse  Given patient has AST greater than ALT with normalization of LFTs, may have developed cirrhosis  Will repeat LFTs   Get elastography and remaining liver workup  Further management based   On investigation results  Counseled patient to stop all alcohol intake  2  Screening for colon cancer    Last colonoscopy was in 2011 which was normal   Patient will be due for colonoscopy next year  Patient would prefer to get in January 2021  Will order prep and procedure  3  Preventative Healthcare  Counseled patient to get flu vaccine  Cong Arriaza MD  Gastroenterology Fellow  [address]  Date: September 30, 2020    Orders Placed This Encounter   Procedures    US elastography    Hepatic function panel    QING Screen w/ Reflex to Titer/Pattern    Antimitochondrial antibody    Anti-smooth muscle antibody, IgG    IgG, IgA, IgM    Hepatitis A antibody, total    Hepatitis B surface antibody    Celiac Disease Antibody Profile    Ceruloplasmin    Alpha 1 Antitrypsin Phenotype    Colonoscopy       ______________________________________________________________________    HPI:    61-year-old with HTN, HLD, osteoporosis, alcohol abuse presents for evaluation  Patient denies any nausea, vomiting, abdominal pain, constipation, diarrhea, blood in stools, weight loss, early satiety  No family history of GI or liver cancer  She has been taking at least 3 beers per day for the last  5-6 years  She smokes cigarettes    No history of IV drug use, snorting cocaine or any other drug abuse  Last colonoscopy was in 2011 which was normal     Labs from January 2020 show hemoglobin 11 1, platelets 782, AST 76, , total bili 1 33  LFTs in March 2020 normalized  HGB earlier today was 11 7 normal   Hepatitis-B core antibody was positive but surface antigen was negative  Hepatitis-C screen was negative  A1c was normal   Triglycerides and  HDL were normal   Ultrasound in January 2020 was normal     REVIEW OF SYSTEMS:    CONSTITUTIONAL: Denies any fever, chills, rigors, and weight loss  HEENT: No earache or tinnitus  Denies hearing loss or visual disturbances  CARDIOVASCULAR: No chest pain or palpitations  RESPIRATORY: Denies any cough, hemoptysis, shortness of breath or dyspnea on exertion  GASTROINTESTINAL: As noted in the History of Present Illness  GENITOURINARY: No problems with urination  Denies any hematuria or dysuria  NEUROLOGIC: No dizziness or vertigo, denies headaches  MUSCULOSKELETAL: Denies any muscle or joint pain  SKIN: Denies skin rashes or itching  ENDOCRINE: Denies excessive thirst  Denies intolerance to heat or cold  PSYCHOSOCIAL: Denies depression or anxiety  Denies any recent memory loss         Historical Information   Past Medical History:   Diagnosis Date    Hypertension     Shingles     Smoking 1/2 pack a day or less      Past Surgical History:   Procedure Laterality Date    BREAST CYST EXCISION Left     done in 80s     Social History   Social History     Substance and Sexual Activity   Alcohol Use Yes    Alcohol/week: 2 0 standard drinks    Types: 2 Cans of beer per week    Frequency: 4 or more times a week    Drinks per session: 1 or 2     Social History     Substance and Sexual Activity   Drug Use Yes    Frequency: 2 0 times per week    Types: Marijuana     Social History     Tobacco Use   Smoking Status Current Every Day Smoker    Packs/day: 0 50    Years: 25 00    Pack years: 12 50    Types: Cigarettes   Smokeless Tobacco Never Used     Family History   Problem Relation Age of Onset    Diabetes Mother     Alcohol abuse Father     Diabetes Sister     Diabetes Brother     No Known Problems Son     Substance Abuse Sister     Hypertension Sister     Diabetes Brother     No Known Problems Son     No Known Problems Son        Meds/Allergies       Current Outpatient Medications:     alendronate (FOSAMAX) 70 mg tablet    amLODIPine (NORVASC) 10 mg tablet    atorvastatin (LIPITOR) 20 mg tablet    hydrochlorothiazide (HYDRODIURIL) 25 mg tablet    Allergies   Allergen Reactions    Lisinopril            Objective     Blood pressure 156/92, pulse 84, temperature 98 4 °F (36 9 °C), temperature source Tympanic, height 5' 2" (1 575 m), weight 47 6 kg (105 lb), not currently breastfeeding  Body mass index is 19 2 kg/m²  PHYSICAL EXAM:      General Appearance:   Alert, cooperative, no distress   HEENT:   Normocephalic, atraumatic, anicteric      Neck:  Supple, symmetrical, trachea midline   Lungs:   Clear to auscultation bilaterally; no rales, rhonchi or wheezing; respirations unlabored    Heart[de-identified]   Regular rate and rhythm; no murmur, rub, or gallop     Abdomen:   Soft, non-tender, non-distended; normal bowel sounds; no masses, no organomegaly    Genitalia:   Deferred    Rectal:   Deferred    Extremities:  No cyanosis, clubbing or edema    Pulses:  2+ and symmetric    Skin:  No jaundice, rashes, or lesions    Lymph nodes:  No palpable cervical lymphadenopathy        Lab Results:   Appointment on 09/30/2020   Component Date Value    Alkaline Phosphatase 09/30/2020 81     Calcium 09/30/2020 9 5     WBC 09/30/2020 8 00     RBC 09/30/2020 3 75*    Hemoglobin 09/30/2020 11 7     Hematocrit 09/30/2020 34 0*    MCV 09/30/2020 91     MCH 09/30/2020 31 2     MCHC 09/30/2020 34 4     RDW 09/30/2020 14 0     Platelets 15/28/7031 524*    MPV 09/30/2020 9 8     Creatinine 09/30/2020 0 59*    eGFR 09/30/2020 115     Phosphorus 09/30/2020 3 7     PTH 09/30/2020 40 5     TSH 3RD GENERATON 09/30/2020 2 700     Vit D, 25-Hydroxy 09/30/2020 17 0*         Radiology Results:   No results found

## 2020-10-01 LAB
ALBUMIN SERPL ELPH-MCNC: 4.37 G/DL (ref 3.5–5)
ALBUMIN SERPL ELPH-MCNC: 62.4 % (ref 52–65)
ALPHA1 GLOB SERPL ELPH-MCNC: 0.33 G/DL (ref 0.1–0.4)
ALPHA1 GLOB SERPL ELPH-MCNC: 4.7 % (ref 2.5–5)
ALPHA2 GLOB SERPL ELPH-MCNC: 0.71 G/DL (ref 0.4–1.2)
ALPHA2 GLOB SERPL ELPH-MCNC: 10.1 % (ref 7–13)
BETA GLOB ABNORMAL SERPL ELPH-MCNC: 0.39 G/DL (ref 0.4–0.8)
BETA1 GLOB SERPL ELPH-MCNC: 5.6 % (ref 5–13)
BETA2 GLOB SERPL ELPH-MCNC: 4.1 % (ref 2–8)
BETA2+GAMMA GLOB SERPL ELPH-MCNC: 0.29 G/DL (ref 0.2–0.5)
GAMMA GLOB ABNORMAL SERPL ELPH-MCNC: 0.92 G/DL (ref 0.5–1.6)
GAMMA GLOB SERPL ELPH-MCNC: 13.1 % (ref 12–22)
IGG/ALB SER: 1.66 {RATIO} (ref 1.1–1.8)
PROT PATTERN SERPL ELPH-IMP: ABNORMAL
PROT SERPL-MCNC: 7 G/DL (ref 6.4–8.2)

## 2020-10-08 DIAGNOSIS — E55.9 VITAMIN D DEFICIENCY: Primary | ICD-10-CM

## 2020-10-08 RX ORDER — ERGOCALCIFEROL 1.25 MG/1
50000 CAPSULE ORAL WEEKLY
Qty: 12 CAPSULE | Refills: 0 | Status: SHIPPED | OUTPATIENT
Start: 2020-10-08 | End: 2021-09-13

## 2020-11-12 ENCOUNTER — HOSPITAL ENCOUNTER (EMERGENCY)
Facility: HOSPITAL | Age: 60
Discharge: HOME | End: 2020-11-12
Attending: EMERGENCY MEDICINE | Admitting: EMERGENCY MEDICINE
Payer: COMMERCIAL

## 2020-11-12 ENCOUNTER — APPOINTMENT (EMERGENCY)
Dept: RADIOLOGY | Facility: HOSPITAL | Age: 60
End: 2020-11-12
Payer: COMMERCIAL

## 2020-11-12 VITALS
HEIGHT: 63 IN | DIASTOLIC BLOOD PRESSURE: 72 MMHG | SYSTOLIC BLOOD PRESSURE: 152 MMHG | TEMPERATURE: 98.1 F | OXYGEN SATURATION: 98 % | HEART RATE: 82 BPM | BODY MASS INDEX: 18.61 KG/M2 | WEIGHT: 105 LBS | RESPIRATION RATE: 19 BRPM

## 2020-11-12 DIAGNOSIS — S62.609A FINGER FRACTURE: Primary | ICD-10-CM

## 2020-11-12 DIAGNOSIS — M79.641 RIGHT HAND PAIN: ICD-10-CM

## 2020-11-12 DIAGNOSIS — M79.89 SWELLING OF RIGHT HAND: ICD-10-CM

## 2020-11-12 PROCEDURE — 29125 APPL SHORT ARM SPLINT STATIC: CPT | Performed by: EMERGENCY MEDICINE

## 2020-11-12 PROCEDURE — 73130 X-RAY EXAM OF HAND: CPT

## 2020-11-12 PROCEDURE — 99284 EMERGENCY DEPT VISIT MOD MDM: CPT | Performed by: EMERGENCY MEDICINE

## 2020-11-12 PROCEDURE — 99283 EMERGENCY DEPT VISIT LOW MDM: CPT

## 2020-11-12 RX ORDER — IBUPROFEN 600 MG/1
600 TABLET ORAL ONCE
Status: COMPLETED | OUTPATIENT
Start: 2020-11-12 | End: 2020-11-12

## 2020-11-12 RX ORDER — ACETAMINOPHEN 325 MG/1
975 TABLET ORAL ONCE
Status: COMPLETED | OUTPATIENT
Start: 2020-11-12 | End: 2020-11-12

## 2020-11-12 RX ADMIN — IBUPROFEN 600 MG: 600 TABLET, FILM COATED ORAL at 20:59

## 2020-11-12 RX ADMIN — ACETAMINOPHEN 975 MG: 325 TABLET, FILM COATED ORAL at 20:59

## 2020-11-16 ENCOUNTER — HOSPITAL ENCOUNTER (OUTPATIENT)
Dept: RADIOLOGY | Facility: HOSPITAL | Age: 60
Discharge: HOME | End: 2020-11-16
Payer: COMMERCIAL

## 2020-11-16 ENCOUNTER — OFFICE VISIT (OUTPATIENT)
Dept: OBGYN CLINIC | Facility: HOSPITAL | Age: 60
End: 2020-11-16
Payer: COMMERCIAL

## 2020-11-16 VITALS
HEIGHT: 63 IN | DIASTOLIC BLOOD PRESSURE: 83 MMHG | BODY MASS INDEX: 18.61 KG/M2 | SYSTOLIC BLOOD PRESSURE: 148 MMHG | WEIGHT: 105 LBS | HEART RATE: 79 BPM

## 2020-11-16 DIAGNOSIS — S62.644A NONDISPLACED FRACTURE OF PROXIMAL PHALANX OF RIGHT RING FINGER, INITIAL ENCOUNTER FOR CLOSED FRACTURE: Primary | ICD-10-CM

## 2020-11-16 DIAGNOSIS — S62.644A NONDISPLACED FRACTURE OF PROXIMAL PHALANX OF RIGHT RING FINGER, INITIAL ENCOUNTER FOR CLOSED FRACTURE: ICD-10-CM

## 2020-11-16 PROCEDURE — 73140 X-RAY EXAM OF FINGER(S): CPT

## 2020-11-16 PROCEDURE — 29125 APPL SHORT ARM SPLINT STATIC: CPT | Performed by: PHYSICIAN ASSISTANT

## 2020-11-16 PROCEDURE — 99203 OFFICE O/P NEW LOW 30 MIN: CPT | Performed by: PHYSICIAN ASSISTANT

## 2020-11-16 PROCEDURE — 4004F PT TOBACCO SCREEN RCVD TLK: CPT | Performed by: PHYSICIAN ASSISTANT

## 2020-11-25 ENCOUNTER — OFFICE VISIT (OUTPATIENT)
Dept: OBGYN CLINIC | Facility: HOSPITAL | Age: 60
End: 2020-11-25
Payer: COMMERCIAL

## 2020-11-25 ENCOUNTER — HOSPITAL ENCOUNTER (OUTPATIENT)
Dept: RADIOLOGY | Facility: HOSPITAL | Age: 60
Discharge: HOME | End: 2020-11-25
Payer: COMMERCIAL

## 2020-11-25 ENCOUNTER — LAB (OUTPATIENT)
Dept: LAB | Facility: HOSPITAL | Age: 60
End: 2020-11-25
Payer: COMMERCIAL

## 2020-11-25 VITALS
HEIGHT: 63 IN | SYSTOLIC BLOOD PRESSURE: 128 MMHG | WEIGHT: 109.8 LBS | HEART RATE: 93 BPM | BODY MASS INDEX: 19.45 KG/M2 | DIASTOLIC BLOOD PRESSURE: 85 MMHG

## 2020-11-25 DIAGNOSIS — S62.644A NONDISPLACED FRACTURE OF PROXIMAL PHALANX OF RIGHT RING FINGER, INITIAL ENCOUNTER FOR CLOSED FRACTURE: Primary | ICD-10-CM

## 2020-11-25 DIAGNOSIS — Z12.11 SCREENING FOR COLON CANCER: ICD-10-CM

## 2020-11-25 DIAGNOSIS — R74.8 ELEVATED LIVER ENZYMES: ICD-10-CM

## 2020-11-25 DIAGNOSIS — S62.644A NONDISPLACED FRACTURE OF PROXIMAL PHALANX OF RIGHT RING FINGER, INITIAL ENCOUNTER FOR CLOSED FRACTURE: ICD-10-CM

## 2020-11-25 LAB
ALBUMIN SERPL BCP-MCNC: 4.6 G/DL (ref 3.5–5)
ALP SERPL-CCNC: 71 U/L (ref 46–116)
ALT SERPL W P-5'-P-CCNC: 32 U/L (ref 12–78)
AST SERPL W P-5'-P-CCNC: 50 U/L (ref 5–45)
BILIRUB DIRECT SERPL-MCNC: 0.22 MG/DL (ref 0–0.2)
BILIRUB SERPL-MCNC: 0.64 MG/DL (ref 0.2–1)
FERRITIN SERPL-MCNC: 78 NG/ML (ref 8–388)
HAV AB SER QL IA: REACTIVE
HBV SURFACE AB SER-ACNC: 23.45 MIU/ML
IGA SERPL-MCNC: 192 MG/DL (ref 70–400)
IGG SERPL-MCNC: 988 MG/DL (ref 700–1600)
IGM SERPL-MCNC: 88 MG/DL (ref 40–230)
IRON SATN MFR SERPL: 18 %
IRON SERPL-MCNC: 75 UG/DL (ref 50–170)
PROT SERPL-MCNC: 8.3 G/DL (ref 6.4–8.2)
TIBC SERPL-MCNC: 417 UG/DL (ref 250–450)

## 2020-11-25 PROCEDURE — 86255 FLUORESCENT ANTIBODY SCREEN: CPT

## 2020-11-25 PROCEDURE — 3079F DIAST BP 80-89 MM HG: CPT | Performed by: ORTHOPAEDIC SURGERY

## 2020-11-25 PROCEDURE — 83540 ASSAY OF IRON: CPT

## 2020-11-25 PROCEDURE — 3074F SYST BP LT 130 MM HG: CPT | Performed by: ORTHOPAEDIC SURGERY

## 2020-11-25 PROCEDURE — 36415 COLL VENOUS BLD VENIPUNCTURE: CPT

## 2020-11-25 PROCEDURE — 86256 FLUORESCENT ANTIBODY TITER: CPT

## 2020-11-25 PROCEDURE — 73140 X-RAY EXAM OF FINGER(S): CPT

## 2020-11-25 PROCEDURE — 83550 IRON BINDING TEST: CPT

## 2020-11-25 PROCEDURE — 83516 IMMUNOASSAY NONANTIBODY: CPT

## 2020-11-25 PROCEDURE — 86708 HEPATITIS A ANTIBODY: CPT

## 2020-11-25 PROCEDURE — 3008F BODY MASS INDEX DOCD: CPT | Performed by: ORTHOPAEDIC SURGERY

## 2020-11-25 PROCEDURE — 82784 ASSAY IGA/IGD/IGG/IGM EACH: CPT

## 2020-11-25 PROCEDURE — 4004F PT TOBACCO SCREEN RCVD TLK: CPT | Performed by: ORTHOPAEDIC SURGERY

## 2020-11-25 PROCEDURE — 82103 ALPHA-1-ANTITRYPSIN TOTAL: CPT

## 2020-11-25 PROCEDURE — 86235 NUCLEAR ANTIGEN ANTIBODY: CPT

## 2020-11-25 PROCEDURE — 86038 ANTINUCLEAR ANTIBODIES: CPT

## 2020-11-25 PROCEDURE — 82104 ALPHA-1-ANTITRYPSIN PHENO: CPT

## 2020-11-25 PROCEDURE — 82390 ASSAY OF CERULOPLASMIN: CPT

## 2020-11-25 PROCEDURE — 80076 HEPATIC FUNCTION PANEL: CPT

## 2020-11-25 PROCEDURE — 86706 HEP B SURFACE ANTIBODY: CPT

## 2020-11-25 PROCEDURE — 82728 ASSAY OF FERRITIN: CPT

## 2020-11-25 PROCEDURE — 99214 OFFICE O/P EST MOD 30 MIN: CPT | Performed by: ORTHOPAEDIC SURGERY

## 2020-11-25 RX ORDER — CEFAZOLIN SODIUM 2 G/50ML
2000 SOLUTION INTRAVENOUS ONCE
Status: CANCELLED | OUTPATIENT
Start: 2020-11-25 | End: 2020-11-25

## 2020-11-26 LAB — CERULOPLASMIN SERPL-MCNC: 23.6 MG/DL (ref 19–39)

## 2020-11-27 LAB
ACTIN IGG SERPL-ACNC: 8 UNITS (ref 0–19)
ENDOMYSIUM IGA SER QL: NEGATIVE
GLIADIN PEPTIDE IGA SER-ACNC: 6 UNITS (ref 0–19)
GLIADIN PEPTIDE IGG SER-ACNC: 2 UNITS (ref 0–19)
IGA SERPL-MCNC: 192 MG/DL (ref 87–352)
MITOCHONDRIA M2 IGG SER-ACNC: <20 UNITS (ref 0–20)
RYE IGE QN: NEGATIVE
TTG IGA SER-ACNC: <2 U/ML (ref 0–3)
TTG IGG SER-ACNC: 4 U/ML (ref 0–5)

## 2020-12-01 ENCOUNTER — HOSPITAL ENCOUNTER (OUTPATIENT)
Dept: RADIOLOGY | Facility: HOSPITAL | Age: 60
Setting detail: OUTPATIENT SURGERY
Discharge: HOME | End: 2020-12-01
Payer: COMMERCIAL

## 2020-12-01 ENCOUNTER — HOSPITAL ENCOUNTER (OUTPATIENT)
Facility: HOSPITAL | Age: 60
Setting detail: OUTPATIENT SURGERY
Discharge: HOME | End: 2020-12-01
Attending: ORTHOPAEDIC SURGERY | Admitting: ORTHOPAEDIC SURGERY
Payer: COMMERCIAL

## 2020-12-01 ENCOUNTER — ANESTHESIA (OUTPATIENT)
Dept: PERIOP | Facility: HOSPITAL | Age: 60
End: 2020-12-01
Payer: COMMERCIAL

## 2020-12-01 ENCOUNTER — ANESTHESIA EVENT (OUTPATIENT)
Dept: PERIOP | Facility: HOSPITAL | Age: 60
End: 2020-12-01
Payer: COMMERCIAL

## 2020-12-01 VITALS
HEIGHT: 63 IN | OXYGEN SATURATION: 93 % | WEIGHT: 109 LBS | BODY MASS INDEX: 19.31 KG/M2 | HEART RATE: 80 BPM | SYSTOLIC BLOOD PRESSURE: 112 MMHG | TEMPERATURE: 98 F | DIASTOLIC BLOOD PRESSURE: 67 MMHG | RESPIRATION RATE: 16 BRPM

## 2020-12-01 VITALS — HEART RATE: 93 BPM

## 2020-12-01 DIAGNOSIS — Z47.89 AFTERCARE FOLLOWING SURGERY OF THE MUSCULOSKELETAL SYSTEM: Primary | ICD-10-CM

## 2020-12-01 DIAGNOSIS — S62.644A NONDISPLACED FRACTURE OF PROXIMAL PHALANX OF RIGHT RING FINGER, INITIAL ENCOUNTER FOR CLOSED FRACTURE: ICD-10-CM

## 2020-12-01 PROCEDURE — 26727 TREAT FINGER FRACTURE EACH: CPT | Performed by: ORTHOPAEDIC SURGERY

## 2020-12-01 PROCEDURE — 73140 X-RAY EXAM OF FINGER(S): CPT

## 2020-12-01 PROCEDURE — C1713 ANCHOR/SCREW BN/BN,TIS/BN: HCPCS | Performed by: ORTHOPAEDIC SURGERY

## 2020-12-01 DEVICE — C-WIRE PAK DOUBLE ENDED ORTHOPAEDIC WIRE, SPADE, .045" (1.14 MM)
Type: IMPLANTABLE DEVICE | Site: FINGER | Status: FUNCTIONAL
Brand: C-WIRE

## 2020-12-01 RX ORDER — LIDOCAINE HYDROCHLORIDE 10 MG/ML
INJECTION, SOLUTION EPIDURAL; INFILTRATION; INTRACAUDAL; PERINEURAL AS NEEDED
Status: DISCONTINUED | OUTPATIENT
Start: 2020-12-01 | End: 2020-12-01

## 2020-12-01 RX ORDER — ONDANSETRON 2 MG/ML
INJECTION INTRAMUSCULAR; INTRAVENOUS AS NEEDED
Status: DISCONTINUED | OUTPATIENT
Start: 2020-12-01 | End: 2020-12-01

## 2020-12-01 RX ORDER — SODIUM CHLORIDE, SODIUM LACTATE, POTASSIUM CHLORIDE, CALCIUM CHLORIDE 600; 310; 30; 20 MG/100ML; MG/100ML; MG/100ML; MG/100ML
125 INJECTION, SOLUTION INTRAVENOUS CONTINUOUS
Status: DISCONTINUED | OUTPATIENT
Start: 2020-12-01 | End: 2020-12-01 | Stop reason: HOSPADM

## 2020-12-01 RX ORDER — SUCCINYLCHOLINE/SOD CL,ISO/PF 100 MG/5ML
SYRINGE (ML) INTRAVENOUS AS NEEDED
Status: DISCONTINUED | OUTPATIENT
Start: 2020-12-01 | End: 2020-12-01

## 2020-12-01 RX ORDER — HYDROCODONE BITARTRATE AND ACETAMINOPHEN 5; 325 MG/1; MG/1
1 TABLET ORAL ONCE AS NEEDED
Status: DISCONTINUED | OUTPATIENT
Start: 2020-12-01 | End: 2020-12-01 | Stop reason: HOSPADM

## 2020-12-01 RX ORDER — CEFAZOLIN SODIUM 2 G/50ML
2000 SOLUTION INTRAVENOUS ONCE
Status: COMPLETED | OUTPATIENT
Start: 2020-12-01 | End: 2020-12-01

## 2020-12-01 RX ORDER — LIDOCAINE HYDROCHLORIDE AND EPINEPHRINE 10; 10 MG/ML; UG/ML
INJECTION, SOLUTION INFILTRATION; PERINEURAL AS NEEDED
Status: DISCONTINUED | OUTPATIENT
Start: 2020-12-01 | End: 2020-12-01 | Stop reason: HOSPADM

## 2020-12-01 RX ORDER — FENTANYL CITRATE 50 UG/ML
INJECTION, SOLUTION INTRAMUSCULAR; INTRAVENOUS AS NEEDED
Status: DISCONTINUED | OUTPATIENT
Start: 2020-12-01 | End: 2020-12-01

## 2020-12-01 RX ORDER — LIDOCAINE HYDROCHLORIDE 10 MG/ML
0.5 INJECTION, SOLUTION EPIDURAL; INFILTRATION; INTRACAUDAL; PERINEURAL ONCE AS NEEDED
Status: COMPLETED | OUTPATIENT
Start: 2020-12-01 | End: 2020-12-01

## 2020-12-01 RX ORDER — DEXAMETHASONE SODIUM PHOSPHATE 10 MG/ML
INJECTION, SOLUTION INTRAMUSCULAR; INTRAVENOUS AS NEEDED
Status: DISCONTINUED | OUTPATIENT
Start: 2020-12-01 | End: 2020-12-01

## 2020-12-01 RX ORDER — PROMETHAZINE HYDROCHLORIDE 25 MG/ML
25 INJECTION, SOLUTION INTRAMUSCULAR; INTRAVENOUS ONCE AS NEEDED
Status: COMPLETED | OUTPATIENT
Start: 2020-12-01 | End: 2020-12-01

## 2020-12-01 RX ORDER — SODIUM CHLORIDE, SODIUM LACTATE, POTASSIUM CHLORIDE, CALCIUM CHLORIDE 600; 310; 30; 20 MG/100ML; MG/100ML; MG/100ML; MG/100ML
INJECTION, SOLUTION INTRAVENOUS CONTINUOUS PRN
Status: DISCONTINUED | OUTPATIENT
Start: 2020-12-01 | End: 2020-12-01

## 2020-12-01 RX ORDER — NAPROXEN SODIUM 220 MG
220 TABLET ORAL 2 TIMES DAILY WITH MEALS
Qty: 10 TABLET | Refills: 0 | Status: SHIPPED | OUTPATIENT
Start: 2020-12-01 | End: 2021-03-23 | Stop reason: ALTCHOICE

## 2020-12-01 RX ORDER — PROPOFOL 10 MG/ML
INJECTION, EMULSION INTRAVENOUS AS NEEDED
Status: DISCONTINUED | OUTPATIENT
Start: 2020-12-01 | End: 2020-12-01

## 2020-12-01 RX ORDER — FENTANYL CITRATE/PF 50 MCG/ML
25 SYRINGE (ML) INJECTION
Status: DISCONTINUED | OUTPATIENT
Start: 2020-12-01 | End: 2020-12-01 | Stop reason: HOSPADM

## 2020-12-01 RX ORDER — ONDANSETRON 2 MG/ML
4 INJECTION INTRAMUSCULAR; INTRAVENOUS ONCE AS NEEDED
Status: COMPLETED | OUTPATIENT
Start: 2020-12-01 | End: 2020-12-01

## 2020-12-01 RX ORDER — MIDAZOLAM HYDROCHLORIDE 2 MG/2ML
INJECTION, SOLUTION INTRAMUSCULAR; INTRAVENOUS AS NEEDED
Status: DISCONTINUED | OUTPATIENT
Start: 2020-12-01 | End: 2020-12-01

## 2020-12-01 RX ORDER — HYDROMORPHONE HCL/PF 1 MG/ML
SYRINGE (ML) INJECTION AS NEEDED
Status: DISCONTINUED | OUTPATIENT
Start: 2020-12-01 | End: 2020-12-01

## 2020-12-01 RX ORDER — HYDROCODONE BITARTRATE AND ACETAMINOPHEN 5; 325 MG/1; MG/1
1 TABLET ORAL EVERY 6 HOURS PRN
Qty: 15 TABLET | Refills: 0 | Status: SHIPPED | OUTPATIENT
Start: 2020-12-01 | End: 2021-03-23 | Stop reason: ALTCHOICE

## 2020-12-01 RX ORDER — SODIUM CHLORIDE, SODIUM LACTATE, POTASSIUM CHLORIDE, CALCIUM CHLORIDE 600; 310; 30; 20 MG/100ML; MG/100ML; MG/100ML; MG/100ML
20 INJECTION, SOLUTION INTRAVENOUS CONTINUOUS
Status: DISCONTINUED | OUTPATIENT
Start: 2020-12-01 | End: 2020-12-01 | Stop reason: HOSPADM

## 2020-12-01 RX ORDER — SENNOSIDES 8.6 MG
650 CAPSULE ORAL EVERY 8 HOURS
Qty: 15 TABLET | Refills: 0 | Status: SHIPPED | OUTPATIENT
Start: 2020-12-01 | End: 2020-12-06

## 2020-12-01 RX ADMIN — PROPOFOL 200 MG: 10 INJECTION, EMULSION INTRAVENOUS at 10:15

## 2020-12-01 RX ADMIN — LIDOCAINE HYDROCHLORIDE 0.2 ML: 10 INJECTION, SOLUTION EPIDURAL; INFILTRATION; INTRACAUDAL; PERINEURAL at 08:52

## 2020-12-01 RX ADMIN — MIDAZOLAM 2 MG: 1 INJECTION INTRAMUSCULAR; INTRAVENOUS at 10:11

## 2020-12-01 RX ADMIN — ONDANSETRON 4 MG: 2 INJECTION INTRAMUSCULAR; INTRAVENOUS at 10:35

## 2020-12-01 RX ADMIN — CEFAZOLIN SODIUM 2000 MG: 2 SOLUTION INTRAVENOUS at 10:08

## 2020-12-01 RX ADMIN — SODIUM CHLORIDE, SODIUM LACTATE, POTASSIUM CHLORIDE, AND CALCIUM CHLORIDE 125 ML/HR: .6; .31; .03; .02 INJECTION, SOLUTION INTRAVENOUS at 08:51

## 2020-12-01 RX ADMIN — Medication 100 MG: at 10:24

## 2020-12-01 RX ADMIN — PROPOFOL 100 MG: 10 INJECTION, EMULSION INTRAVENOUS at 10:24

## 2020-12-01 RX ADMIN — DEXAMETHASONE SODIUM PHOSPHATE 10 MG: 10 INJECTION, SOLUTION INTRAMUSCULAR; INTRAVENOUS at 10:35

## 2020-12-01 RX ADMIN — SODIUM CHLORIDE, SODIUM LACTATE, POTASSIUM CHLORIDE, AND CALCIUM CHLORIDE: .6; .31; .03; .02 INJECTION, SOLUTION INTRAVENOUS at 10:11

## 2020-12-01 RX ADMIN — ONDANSETRON 4 MG: 2 INJECTION INTRAMUSCULAR; INTRAVENOUS at 11:15

## 2020-12-01 RX ADMIN — HYDROMORPHONE HYDROCHLORIDE 0.5 MG: 1 INJECTION, SOLUTION INTRAMUSCULAR; INTRAVENOUS; SUBCUTANEOUS at 10:44

## 2020-12-01 RX ADMIN — FENTANYL CITRATE 100 MCG: 50 INJECTION INTRAMUSCULAR; INTRAVENOUS at 10:30

## 2020-12-01 RX ADMIN — PROMETHAZINE HYDROCHLORIDE 25 MG: 25 INJECTION INTRAMUSCULAR; INTRAVENOUS at 11:38

## 2020-12-01 RX ADMIN — LIDOCAINE HYDROCHLORIDE 40 MG: 10 INJECTION, SOLUTION EPIDURAL; INFILTRATION; INTRACAUDAL; PERINEURAL at 10:15

## 2020-12-02 LAB
A1AT PHENOTYP SERPL IFE: NORMAL
A1AT SERPL-MCNC: 146 MG/DL (ref 101–187)

## 2020-12-09 ENCOUNTER — HOSPITAL ENCOUNTER (OUTPATIENT)
Dept: RADIOLOGY | Facility: HOSPITAL | Age: 60
Discharge: HOME | End: 2020-12-09
Payer: COMMERCIAL

## 2020-12-09 ENCOUNTER — OFFICE VISIT (OUTPATIENT)
Dept: OBGYN CLINIC | Facility: HOSPITAL | Age: 60
End: 2020-12-09

## 2020-12-09 VITALS
HEART RATE: 86 BPM | WEIGHT: 112 LBS | BODY MASS INDEX: 19.84 KG/M2 | DIASTOLIC BLOOD PRESSURE: 84 MMHG | SYSTOLIC BLOOD PRESSURE: 150 MMHG | HEIGHT: 63 IN

## 2020-12-09 DIAGNOSIS — Z47.89 AFTERCARE FOLLOWING SURGERY OF THE MUSCULOSKELETAL SYSTEM: ICD-10-CM

## 2020-12-09 DIAGNOSIS — Z47.89 AFTERCARE FOLLOWING SURGERY OF THE MUSCULOSKELETAL SYSTEM: Primary | ICD-10-CM

## 2020-12-09 PROCEDURE — 73140 X-RAY EXAM OF FINGER(S): CPT

## 2020-12-09 PROCEDURE — 3008F BODY MASS INDEX DOCD: CPT | Performed by: ORTHOPAEDIC SURGERY

## 2020-12-09 PROCEDURE — 99024 POSTOP FOLLOW-UP VISIT: CPT | Performed by: PHYSICIAN ASSISTANT

## 2020-12-14 DIAGNOSIS — R79.0 ABNORMAL IRON SATURATION: Primary | ICD-10-CM

## 2020-12-16 DIAGNOSIS — I25.10 ASCVD (ARTERIOSCLEROTIC CARDIOVASCULAR DISEASE): ICD-10-CM

## 2020-12-16 DIAGNOSIS — I16.0 HYPERTENSIVE URGENCY: ICD-10-CM

## 2020-12-16 RX ORDER — AMLODIPINE BESYLATE 10 MG/1
TABLET ORAL
Qty: 90 TABLET | Refills: 0 | Status: SHIPPED | OUTPATIENT
Start: 2020-12-16 | End: 2021-03-15

## 2020-12-16 RX ORDER — HYDROCHLOROTHIAZIDE 25 MG/1
TABLET ORAL
Qty: 90 TABLET | Refills: 0 | Status: SHIPPED | OUTPATIENT
Start: 2020-12-16 | End: 2021-03-15

## 2020-12-16 RX ORDER — ATORVASTATIN CALCIUM 20 MG/1
TABLET, FILM COATED ORAL
Qty: 90 TABLET | Refills: 0 | Status: SHIPPED | OUTPATIENT
Start: 2020-12-16 | End: 2021-03-15

## 2020-12-27 DIAGNOSIS — E55.9 VITAMIN D DEFICIENCY: ICD-10-CM

## 2020-12-27 RX ORDER — ERGOCALCIFEROL 1.25 MG/1
CAPSULE ORAL
Qty: 12 CAPSULE | Refills: 0 | OUTPATIENT
Start: 2020-12-27

## 2020-12-30 ENCOUNTER — HOSPITAL ENCOUNTER (OUTPATIENT)
Dept: RADIOLOGY | Facility: HOSPITAL | Age: 60
Discharge: HOME | End: 2020-12-30
Payer: COMMERCIAL

## 2020-12-30 ENCOUNTER — TELEPHONE (OUTPATIENT)
Dept: OBGYN CLINIC | Facility: HOSPITAL | Age: 60
End: 2020-12-30

## 2020-12-30 ENCOUNTER — OFFICE VISIT (OUTPATIENT)
Dept: OBGYN CLINIC | Facility: HOSPITAL | Age: 60
End: 2020-12-30

## 2020-12-30 VITALS
HEIGHT: 63 IN | SYSTOLIC BLOOD PRESSURE: 162 MMHG | DIASTOLIC BLOOD PRESSURE: 94 MMHG | BODY MASS INDEX: 19.7 KG/M2 | WEIGHT: 111.2 LBS | HEART RATE: 88 BPM

## 2020-12-30 DIAGNOSIS — Z47.89 AFTERCARE FOLLOWING SURGERY OF THE MUSCULOSKELETAL SYSTEM: ICD-10-CM

## 2020-12-30 DIAGNOSIS — Z47.89 AFTERCARE FOLLOWING SURGERY OF THE MUSCULOSKELETAL SYSTEM: Primary | ICD-10-CM

## 2020-12-30 PROCEDURE — 99024 POSTOP FOLLOW-UP VISIT: CPT | Performed by: PHYSICIAN ASSISTANT

## 2020-12-30 PROCEDURE — 73140 X-RAY EXAM OF FINGER(S): CPT

## 2020-12-30 PROCEDURE — 3008F BODY MASS INDEX DOCD: CPT | Performed by: ORTHOPAEDIC SURGERY

## 2021-01-06 ENCOUNTER — ANESTHESIA EVENT (OUTPATIENT)
Dept: GASTROENTEROLOGY | Facility: HOSPITAL | Age: 61
End: 2021-01-06

## 2021-01-06 RX ORDER — SODIUM CHLORIDE 9 MG/ML
125 INJECTION, SOLUTION INTRAVENOUS CONTINUOUS
Status: CANCELLED | OUTPATIENT
Start: 2021-01-06

## 2021-01-07 ENCOUNTER — ANESTHESIA (OUTPATIENT)
Dept: GASTROENTEROLOGY | Facility: HOSPITAL | Age: 61
End: 2021-01-07

## 2021-01-07 ENCOUNTER — HOSPITAL ENCOUNTER (OUTPATIENT)
Dept: GASTROENTEROLOGY | Facility: HOSPITAL | Age: 61
Setting detail: OUTPATIENT SURGERY
Discharge: HOME | End: 2021-01-07
Admitting: INTERNAL MEDICINE
Payer: COMMERCIAL

## 2021-01-07 VITALS
HEART RATE: 79 BPM | RESPIRATION RATE: 18 BRPM | OXYGEN SATURATION: 94 % | DIASTOLIC BLOOD PRESSURE: 79 MMHG | TEMPERATURE: 99.5 F | SYSTOLIC BLOOD PRESSURE: 129 MMHG

## 2021-01-07 VITALS — HEART RATE: 86 BPM

## 2021-01-07 DIAGNOSIS — R74.8 ELEVATED LIVER ENZYMES: ICD-10-CM

## 2021-01-07 DIAGNOSIS — Z12.11 SCREENING FOR COLON CANCER: ICD-10-CM

## 2021-01-07 PROCEDURE — 45380 COLONOSCOPY AND BIOPSY: CPT | Performed by: INTERNAL MEDICINE

## 2021-01-07 PROCEDURE — 45385 COLONOSCOPY W/LESION REMOVAL: CPT | Performed by: INTERNAL MEDICINE

## 2021-01-07 PROCEDURE — 88305 TISSUE EXAM BY PATHOLOGIST: CPT | Performed by: PATHOLOGY

## 2021-01-07 RX ORDER — SODIUM CHLORIDE, SODIUM LACTATE, POTASSIUM CHLORIDE, CALCIUM CHLORIDE 600; 310; 30; 20 MG/100ML; MG/100ML; MG/100ML; MG/100ML
INJECTION, SOLUTION INTRAVENOUS CONTINUOUS PRN
Status: DISCONTINUED | OUTPATIENT
Start: 2021-01-07 | End: 2021-01-07

## 2021-01-07 RX ORDER — PROPOFOL 10 MG/ML
INJECTION, EMULSION INTRAVENOUS AS NEEDED
Status: DISCONTINUED | OUTPATIENT
Start: 2021-01-07 | End: 2021-01-07

## 2021-01-07 RX ADMIN — PROPOFOL 100 MG: 10 INJECTION, EMULSION INTRAVENOUS at 08:14

## 2021-01-07 RX ADMIN — PROPOFOL 100 MG: 10 INJECTION, EMULSION INTRAVENOUS at 08:29

## 2021-01-07 RX ADMIN — PROPOFOL 100 MG: 10 INJECTION, EMULSION INTRAVENOUS at 08:16

## 2021-01-07 RX ADMIN — PROPOFOL 100 MG: 10 INJECTION, EMULSION INTRAVENOUS at 08:11

## 2021-01-07 RX ADMIN — PROPOFOL 150 MG: 10 INJECTION, EMULSION INTRAVENOUS at 08:08

## 2021-01-07 RX ADMIN — SODIUM CHLORIDE, SODIUM LACTATE, POTASSIUM CHLORIDE, AND CALCIUM CHLORIDE: .6; .31; .03; .02 INJECTION, SOLUTION INTRAVENOUS at 08:08

## 2021-01-07 RX ADMIN — PROPOFOL 100 MG: 10 INJECTION, EMULSION INTRAVENOUS at 08:18

## 2021-01-07 RX ADMIN — PROPOFOL 100 MG: 10 INJECTION, EMULSION INTRAVENOUS at 08:20

## 2021-01-07 NOTE — H&P
H&P EXAM - Outpatient Endoscopy   Tim Ramos 61 y o  female MRN: [id number]    [facility] GI LAB PRE/POST   Encounter: [id number]        History and Physical - [facility]  Tim Ramos 61 y o  female MRN: [id number]                  HPI: Tim Ramos is a 61y o  year old female who presents for surveillance      REVIEW OF SYSTEMS: Per the HPI, and otherwise unremarkable      Historical Information   Past Medical History:   Diagnosis Date    Hypertension     Shingles     Smoking 1/2 pack a day or less      Past Surgical History:   Procedure Laterality Date    BREAST CYST EXCISION Left     done in 9310P    CAST APPLICATION Right 07/3/6993    Procedure: APPLICATION SHORT ARM ULNAR GAUNTLET CAST;  Surgeon: Zee Luciano MD;  Location: [facility];  Service: Orthopedics    IN OPEN [facility] Right 12/1/2020    Procedure: CLOSED REDUCTION AND PINNING OF RIGHT RING FINGER PROXIMAL PHALANX FRACTURE;  Surgeon: Zee Luciano MD;  Location[facility];  Service: Orthopedics     Social History   Social History     Substance and Sexual Activity   Alcohol Use Yes    Alcohol/week: 2 0 standard drinks    Types: 2 Cans of beer per week    Frequency: 4 or more times a week    Drinks per session: 1 or 2     Social History     Substance and Sexual Activity   Drug Use Yes    Frequency: 2 0 times per week    Types: Marijuana     Social History     Tobacco Use   Smoking Status Current Every Day Smoker    Packs/day: 0 50    Years: 25 00    Pack years: 12 50    Types: Cigarettes   Smokeless Tobacco Never Used   Tobacco Comment    cutting back with cigarette     Family History   Problem Relation Age of Onset    Diabetes Mother     Alcohol abuse Father     Diabetes Sister     Diabetes Brother     No Known Problems Son     Substance Abuse Sister     Hypertension Sister     Diabetes Brother     No Known Problems Son     No Known Problems Son        Meds/Allergies     (Not in a hospital admission)      Allergies   Allergen Reactions    Lisinopril        Objective     BP (!) 196/103   Pulse 91   Temp 99 5 °F (37 5 °C) (Tympanic)   Resp 18   LMP  (LMP Unknown)   SpO2 100%       PHYSICAL EXAM    Gen: NAD  CV: RRR  CHEST: Clear  ABD: soft, NT/ND  EXT: no edema      ASSESSMENT/PLAN:  This is a 61y o  year old female here for colonoscopy, and she is stable and optimized for her procedure

## 2021-01-07 NOTE — ANESTHESIA POSTPROCEDURE EVALUATION
Post-Op Assessment Note    CV Status:  Stable  Pain Score: 1    Pain management: adequate     Mental Status:  Sleepy   Hydration Status:  Stable   PONV Controlled:  Controlled   Airway Patency:  Patent      Post Op Vitals Reviewed: Yes      Staff: Anesthesiologist, CRNA         No complications documented      BP     Temp      Pulse     Resp      SpO2

## 2021-01-07 NOTE — ANESTHESIA PREPROCEDURE EVALUATION
Procedure:  COLONOSCOPY    Relevant Problems   CARDIO   (+) Essential hypertension   (+) Hyperlipidemia   (+) Musculoskeletal chest pain      NEURO/PSYCH   (+) Acute non intractable tension-type headache      Other   (+) Alcohol use   (+) Chronic cough   (+) Elevated liver enzymes   (+) Multiple lung nodules on CT   (+) Tobacco abuse      Closed Reduction And Pinning Of Right Ring Finger Proximal Phalanx Fracture - Right and Application Short Arm Ulnar Gauntlet Cast - Right on 12/1/2020  Echo 3/2020: LEFT VENTRICLE:  Size was normal   Systolic function was normal  Ejection fraction was estimated to be 60 %  There was moderate concentric hypertrophy  Doppler parameters were consistent with abnormal left ventricular relaxation (grade 1 diastolic dysfunction)      RIGHT VENTRICLE:  The size was normal   Systolic function was normal      MITRAL VALVE:  There was mild regurgitation  Physical Exam    Airway    Mallampati score: II  TM Distance: >3 FB  Neck ROM: full     Dental       Cardiovascular      Pulmonary      Other Findings        Anesthesia Plan  ASA Score- 2     Anesthesia Type- IV sedation with anesthesia with ASA Monitors  Additional Monitors:   Airway Plan:           Plan Factors-    Chart reviewed  EKG reviewed  Existing labs reviewed  Patient summary reviewed  Patient is not a current smoker  Patient did not smoke on day of surgery  Induction- intravenous  Postoperative Plan-     Informed Consent- Anesthetic plan and risks discussed with patient  I personally reviewed this patient with the CRNA  Discussed and agreed on the Anesthesia Plan with the CRNA  Ed Naranjo

## 2021-01-13 ENCOUNTER — TELEPHONE (OUTPATIENT)
Dept: GASTROENTEROLOGY | Facility: CLINIC | Age: 61
End: 2021-01-13

## 2021-01-13 NOTE — TELEPHONE ENCOUNTER
----- Message from Alissa El DO sent at 1/13/2021  1:07 PM EST -----  Please call patient and let her know that the biopsies from her colonoscopy showed precancerous polyps  There was no evidence of underlying cancer  She will need repeat colonoscopy in 3 years  Thank you  KIA Aquino  Gastroenterology Fellow  [facility]  Available on [email or web address]

## 2021-01-14 NOTE — TELEPHONE ENCOUNTER
Tried returning patient's call, number listed below (does not ring), called other number in chart and it goes straight to VM

## 2021-01-14 NOTE — TELEPHONE ENCOUNTER
Patients GI provider:  Dr Brando Burkett    Number to return call: [phone]    Reason for call: Pt calling returning your call     Scheduled procedure/appointment date if applicable: Apt/procedure n/a

## 2021-02-05 ENCOUNTER — TELEPHONE (OUTPATIENT)
Dept: OBGYN CLINIC | Facility: HOSPITAL | Age: 61
End: 2021-02-05

## 2021-02-05 NOTE — TELEPHONE ENCOUNTER
Patient is calling to reschedule her post op appointment  Patient was advised someone would call her back and she has not received a call  Patient was offered the 11 am this morning, but is unable to get to the office  Please advise

## 2021-02-10 ENCOUNTER — OFFICE VISIT (OUTPATIENT)
Dept: OBGYN CLINIC | Facility: HOSPITAL | Age: 61
End: 2021-02-10

## 2021-02-10 ENCOUNTER — HOSPITAL ENCOUNTER (OUTPATIENT)
Dept: RADIOLOGY | Facility: HOSPITAL | Age: 61
Discharge: HOME | End: 2021-02-10
Attending: ORTHOPAEDIC SURGERY
Payer: COMMERCIAL

## 2021-02-10 VITALS
DIASTOLIC BLOOD PRESSURE: 79 MMHG | BODY MASS INDEX: 19.67 KG/M2 | WEIGHT: 111 LBS | HEART RATE: 84 BPM | HEIGHT: 63 IN | SYSTOLIC BLOOD PRESSURE: 134 MMHG

## 2021-02-10 DIAGNOSIS — S62.644A NONDISPLACED FRACTURE OF PROXIMAL PHALANX OF RIGHT RING FINGER, INITIAL ENCOUNTER FOR CLOSED FRACTURE: ICD-10-CM

## 2021-02-10 DIAGNOSIS — Z47.89 AFTERCARE FOLLOWING SURGERY OF THE MUSCULOSKELETAL SYSTEM: Primary | ICD-10-CM

## 2021-02-10 DIAGNOSIS — Z47.89 AFTERCARE FOLLOWING SURGERY OF THE MUSCULOSKELETAL SYSTEM: ICD-10-CM

## 2021-02-10 PROCEDURE — 73140 X-RAY EXAM OF FINGER(S): CPT

## 2021-02-10 PROCEDURE — 99024 POSTOP FOLLOW-UP VISIT: CPT | Performed by: ORTHOPAEDIC SURGERY

## 2021-02-10 NOTE — LETTER
February 10, 2021     Patient: Alivia Salamanca   YOB: 1960   Date of Visit: 2/10/2021       To Whom it May Concern:    Alivia Salamanca is under my professional care  She was seen in my office on 2/10/2021  She may return to full duty on 3/10/21  If you have any questions or concerns, please don't hesitate to call           Sincerely,          Agnes Sánchez MD        CC: No Recipients

## 2021-02-10 NOTE — PROGRESS NOTES
Assessment:   S/P Closed Reduction And Pinning Of Right Ring Finger Proximal Phalanx Fracture 12/01/20    Plan:   Hand Therapy for HEP  Patient instructed in place and hold exercises    Follow Up:  PRN      CHIEF COMPLAINT:  Chief Complaint   Patient presents with    Right Ring Finger - Fracture, Follow-up         SUBJECTIVE:  Marixa Roberts is a 61y o  year old female who presents for follow up 10 weeks S/P Closed Reduction And Pinning Of Right Ring Finger Proximal Phalanx Fracture  Today patient reports her pain has improve however she states the finger feels stiff         PHYSICAL EXAMINATION:  /79   Pulse 84   Ht 5' 3" (1 6 m)   Wt 50 3 kg (111 lb)   LMP  (LMP Unknown)   BMI 19 66 kg/m²   General: well developed and well nourished, alert, oriented times 3 and appears comfortable  Psychiatric: Normal    MUSCULOSKELETAL EXAMINATION:  Incision: healed  Range of Motion: intrinsic tightness on exam today  Non TTP over the fracture site  Neurovascular status: Neuro intact, good cap refill  Activity Restrictions: No restrictions        STUDIES REVIEWED:  Images were reviewed in PACS by Dr Corina Stapleton and demonstrate: right ring finger demonstrates a healed proximal phalanx fracture      PROCEDURES PERFORMED:  Procedures  No Procedures performed today   Scribe Attestation    I,:  [facility] am acting as a scribe while in the presence of the attending physician :       I,:  Estrella Phelps MD personally performed the services described in this documentation    as scribed in my presence :

## 2021-03-14 DIAGNOSIS — I16.0 HYPERTENSIVE URGENCY: ICD-10-CM

## 2021-03-14 DIAGNOSIS — I25.10 ASCVD (ARTERIOSCLEROTIC CARDIOVASCULAR DISEASE): ICD-10-CM

## 2021-03-15 RX ORDER — HYDROCHLOROTHIAZIDE 25 MG/1
TABLET ORAL
Qty: 30 TABLET | Refills: 0 | Status: SHIPPED | OUTPATIENT
Start: 2021-03-15 | End: 2021-04-01 | Stop reason: HOSPADM

## 2021-03-15 RX ORDER — AMLODIPINE BESYLATE 10 MG/1
TABLET ORAL
Qty: 30 TABLET | Refills: 0 | Status: SHIPPED | OUTPATIENT
Start: 2021-03-15 | End: 2021-09-30 | Stop reason: HOSPADM

## 2021-03-15 RX ORDER — ATORVASTATIN CALCIUM 20 MG/1
TABLET, FILM COATED ORAL
Qty: 30 TABLET | Refills: 0 | Status: SHIPPED | OUTPATIENT
Start: 2021-03-15 | End: 2021-10-06

## 2021-03-22 PROBLEM — Z12.31 ENCOUNTER FOR SCREENING MAMMOGRAM FOR BREAST CANCER: Status: ACTIVE | Noted: 2021-03-22

## 2021-03-22 PROBLEM — Z01.419 ENCOUNTER FOR ANNUAL ROUTINE GYNECOLOGICAL EXAMINATION: Status: ACTIVE | Noted: 2021-03-22

## 2021-03-22 NOTE — PROGRESS NOTES
Assessment/Plan:  NGE                                                                                                               BCM- NA   Co- Testing q 5 yrs  - '25 - Addendum: ASCUS-  ASCCP guidelines recommend re CoTesting in 3 yrs , not 5 - '23  Early menopause 43 , tobacco abuse           RTO 1 yr  SBE monthly  3 D Mammography   Colonoscopy- 1/21- 3 polyps, repeat 1/24  Osteoporosis of the spine T-3 3  LFN -2 2  Risk factors- premature menopause 42                     - smoking 10 cigarettes daily- now 5                     - alcohol, 12 beers per week - was 72, now none for 3 days  - low BMI     Calcium with vitamin-D- 1200 mg a day -compliant  Weight-bearing exercise 3 times a week- suggested she go on 40 minutes walks with her  Wednesdays and Thursdays when she is off  He is retired and very active  Fosamax 70 mg weekly for 5 years  Recheck BMD after 1 year's use of Fosamax with calcium/Vit D supplementation  Smoking cessation  A 50% reduction in alcohol    Diagnoses and all orders for this visit:    Encounter for annual routine gynecological examination    Localized osteoporosis without current pathological fracture  -     alendronate (FOSAMAX) 70 mg tablet; Take 1 tablet (70 mg total) by mouth every 7 days    Early menopause occurring in patient age younger than 39 years    Tobacco abuse    Encounter for screening mammogram for breast cancer  -     Mammo screening bilateral w 3d & cad; Future    -active order for 3 D Mammogram 7/21          Subjective:        Patient ID: Shital Coe is a 61 y o  female  Shani Potter is here for a yearly evaluation  She has no gynecologic problems but she has been currently  Treated for depression and she will be seeing a counselor  Her mother, 80, who lives in [facility] is dying and is currently in hospice    Chintan Rehman had increased her alcohol consumption to having a 6 pack of beer a day( she had been having 2 6 packs a week)  She has not drank for the past 3 days  She has also diminished cigarette smoking to 5 cigarettes a day  The following portions of the patient's history were reviewed and updated as appropriate: She  has a past medical history of Hypertension, Shingles, and Smoking 1/2 pack a day or less  Patient Active Problem List    Diagnosis Date Noted    Reactive depression 03/23/2021    Encounter for annual routine gynecological examination 03/22/2021    Encounter for screening mammogram for breast cancer 03/22/2021    Acute non intractable tension-type headache 02/25/2020    Early menopause occurring in patient age younger than 39 years 02/20/2020    Chronic cough 02/05/2020    Elevated liver enzymes 02/05/2020    Multiple lung nodules on CT 02/05/2020    Alcohol use 02/05/2020    Hyperlipidemia 02/05/2020    Musculoskeletal chest pain 01/20/2020    Tobacco abuse 01/20/2020    Essential hypertension 01/20/2020   PMH:  Menarche 16  G3, P3; SAVD x 3, M's '77, '84, and '88-  8-6, 7-6, and 5-5  Tobaccco Abuse  Menopause 42  HTN '05  SOB 1/20 ED- lung nodules on CT, repeat study for 6 months, also has PFTs  Osteoporosis 8/20  Closed reduction Fx R ring finger 12/20  She  has a past surgical history that includes Breast cyst excision (Left); pr open tx phalangeal shaft fracture prox/middle ea (Right, 19/2/2291); and Cast application (Right, 11/6/1810)  Her family history includes Alcohol abuse in her father; Diabetes in her brother, brother, mother, and sister; Hypertension in her sister; No Known Problems in her son, son, and son; Substance Abuse in her sister     FH:  F- d 50, alcoholic cirrhosis  M- recovered alcoholic for 30 years, DM, amputation, eventually dying in a nursing home 66's  3 Sisters- 2 with HTN, 1 of whom has DM; 1 S murdered  2 Bros- oldest Blind, incarcerated, younger DM, paraplegic from a MVA  Son- murdered 21, karyn  Denies that she or any of her siblings are alcoholics  She  reports that she has been smoking cigarettes  She has a 6 25 pack-year smoking history  She has never used smokeless tobacco  She reports current alcohol use of about 2 0 standard drinks of alcohol per week  She reports current drug use  Frequency: 2 00 times per week  Drug: Marijuana  SH:  to [place] [address]  She is the assistant  at [employer]  Current Outpatient Medications   Medication Sig Dispense Refill    alendronate (FOSAMAX) 70 mg tablet Take 1 tablet (70 mg total) by mouth every 7 days 13 tablet 3    amLODIPine (NORVASC) 10 mg tablet take 1 tablet by mouth once daily 30 tablet 0    atorvastatin (LIPITOR) 20 mg tablet take 1 tablet by mouth once daily with dinner 30 tablet 0    ergocalciferol (VITAMIN D2) 50,000 units Take 1 capsule (50,000 Units total) by mouth once a week for 12 doses 12 capsule 0    FLUoxetine (PROzac) 10 mg capsule Take 1 capsule (10 mg total) by mouth daily 30 capsule 0    hydrochlorothiazide (HYDRODIURIL) 25 mg tablet take 1 tablet by mouth once daily 30 tablet 0     No current facility-administered medications for this visit        Current Outpatient Medications on File Prior to Visit   Medication Sig    amLODIPine (NORVASC) 10 mg tablet take 1 tablet by mouth once daily    atorvastatin (LIPITOR) 20 mg tablet take 1 tablet by mouth once daily with dinner    ergocalciferol (VITAMIN D2) 50,000 units Take 1 capsule (50,000 Units total) by mouth once a week for 12 doses    hydrochlorothiazide (HYDRODIURIL) 25 mg tablet take 1 tablet by mouth once daily    [DISCONTINUED] alendronate (FOSAMAX) 70 mg tablet Take 1 tablet (70 mg total) by mouth every 7 days    [DISCONTINUED] HYDROcodone-acetaminophen (NORCO) 5-325 mg per tablet Take 1 tablet by mouth every 6 (six) hours as needed for pain for up to 15 dosesMax Daily Amount: 4 tablets    [DISCONTINUED] naproxen sodium (ALEVE) 220 MG tablet Take 1 tablet (220 mg total) by mouth 2 (two) times a day with meals for 5 days     No current facility-administered medications on file prior to visit  She is allergic to lisinopril       Review of Systems   Constitutional: Negative for activity change, appetite change, fatigue and unexpected weight change  Eyes: Negative for visual disturbance  Respiratory: Negative for cough, chest tightness, shortness of breath and wheezing  Cardiovascular: Negative for chest pain, palpitations and leg swelling  Breast: Patient denies tenderness, nipple discharge, masses, or erythema  Gastrointestinal: Negative for abdominal distention, abdominal pain, blood in stool, constipation, diarrhea, nausea and vomiting  Endocrine: Negative for cold intolerance and heat intolerance  Genitourinary: Negative for decreased urine volume, difficulty urinating, dyspareunia, dysuria, frequency, hematuria, menstrual problem, pelvic pain, urgency, vaginal bleeding, vaginal discharge and vaginal pain  Having intercourse twice a week without any problems  Musculoskeletal: Negative for arthralgias  Skin: Negative for rash  Neurological: Negative for weakness, light-headedness, numbness and headaches  Hematological: Does not bruise/bleed easily  Psychiatric/Behavioral: Positive for dysphoric mood  Negative for agitation, behavioral problems and sleep disturbance  The patient is not nervous/anxious ( recently resolved)  Objective:    Vitals:    03/23/21 1342   BP: 130/62   Weight: 50 1 kg (110 lb 6 4 oz)   Height: 5' 3" (1 6 m)            Physical Exam  Vitals signs and nursing note reviewed  Constitutional:       General: She is not in acute distress  Appearance: She is well-developed  HENT:      Head: Normocephalic and atraumatic  Eyes:      General: No scleral icterus  Right eye: No discharge  Left eye: No discharge        Extraocular Movements: Extraocular movements intact  Conjunctiva/sclera: Conjunctivae normal    Neck:      Musculoskeletal: Normal range of motion and neck supple  Thyroid: No thyromegaly  Trachea: No tracheal deviation  Cardiovascular:      Rate and Rhythm: Normal rate and regular rhythm  Heart sounds: Normal heart sounds  No murmur  Pulmonary:      Effort: Pulmonary effort is normal  No respiratory distress  Breath sounds: Normal breath sounds  No wheezing  Chest:      Breasts: Breasts are symmetrical          Right: No inverted nipple, mass, nipple discharge, skin change or tenderness  Left: No inverted nipple, mass, nipple discharge, skin change or tenderness  Abdominal:      General: Bowel sounds are normal  There is no distension  Palpations: Abdomen is soft  There is no mass  Tenderness: There is no abdominal tenderness  Genitourinary:     General: Normal vulva  Exam position: Supine  Labia:         Right: No rash, tenderness or lesion  Left: No rash, tenderness or lesion  Vagina: Normal  No vaginal discharge  Cervix: No cervical motion tenderness or discharge  Uterus: Not deviated, not enlarged and not tender  Adnexa:         Right: No mass, tenderness or fullness  Left: No mass, tenderness or fullness  Rectum: No external hemorrhoid  Comments: Urethral meatus within normal limits  Perineum within normal limits  Bladder well supported  Physiologic vaginal atrophy present  Musculoskeletal: Normal range of motion  Lymphadenopathy:      Cervical: No cervical adenopathy  Skin:     General: Skin is warm and dry  Neurological:      Mental Status: She is alert and oriented to person, place, and time  Psychiatric:         Mood and Affect: Mood normal          Behavior: Behavior normal          Thought Content:  Thought content normal          Judgment: Judgment normal

## 2021-03-23 ENCOUNTER — ANNUAL EXAM (OUTPATIENT)
Dept: OBGYN CLINIC | Facility: CLINIC | Age: 61
End: 2021-03-23
Payer: COMMERCIAL

## 2021-03-23 ENCOUNTER — OFFICE VISIT (OUTPATIENT)
Dept: INTERNAL MEDICINE CLINIC | Facility: CLINIC | Age: 61
End: 2021-03-23
Payer: COMMERCIAL

## 2021-03-23 VITALS
HEIGHT: 63 IN | OXYGEN SATURATION: 98 % | SYSTOLIC BLOOD PRESSURE: 134 MMHG | RESPIRATION RATE: 18 BRPM | DIASTOLIC BLOOD PRESSURE: 68 MMHG | TEMPERATURE: 97.1 F | WEIGHT: 107.8 LBS | BODY MASS INDEX: 19.1 KG/M2 | HEART RATE: 82 BPM

## 2021-03-23 VITALS
BODY MASS INDEX: 19.56 KG/M2 | SYSTOLIC BLOOD PRESSURE: 130 MMHG | DIASTOLIC BLOOD PRESSURE: 62 MMHG | WEIGHT: 110.4 LBS | HEIGHT: 63 IN

## 2021-03-23 DIAGNOSIS — Z72.89 ALCOHOL USE: ICD-10-CM

## 2021-03-23 DIAGNOSIS — I10 ESSENTIAL HYPERTENSION: ICD-10-CM

## 2021-03-23 DIAGNOSIS — Z01.419 ENCOUNTER FOR ANNUAL ROUTINE GYNECOLOGICAL EXAMINATION: Primary | ICD-10-CM

## 2021-03-23 DIAGNOSIS — E78.5 HYPERLIPIDEMIA, UNSPECIFIED HYPERLIPIDEMIA TYPE: ICD-10-CM

## 2021-03-23 DIAGNOSIS — F32.9 REACTIVE DEPRESSION: Primary | ICD-10-CM

## 2021-03-23 DIAGNOSIS — Z72.0 TOBACCO ABUSE: ICD-10-CM

## 2021-03-23 DIAGNOSIS — M81.6 LOCALIZED OSTEOPOROSIS WITHOUT CURRENT PATHOLOGICAL FRACTURE: ICD-10-CM

## 2021-03-23 DIAGNOSIS — Z23 ENCOUNTER FOR IMMUNIZATION: ICD-10-CM

## 2021-03-23 DIAGNOSIS — E28.319 EARLY MENOPAUSE OCCURRING IN PATIENT AGE YOUNGER THAN 45 YEARS: ICD-10-CM

## 2021-03-23 DIAGNOSIS — Z13.1 SCREENING FOR DIABETES MELLITUS: ICD-10-CM

## 2021-03-23 DIAGNOSIS — Z12.31 ENCOUNTER FOR SCREENING MAMMOGRAM FOR BREAST CANCER: ICD-10-CM

## 2021-03-23 PROCEDURE — 4004F PT TOBACCO SCREEN RCVD TLK: CPT | Performed by: NURSE PRACTITIONER

## 2021-03-23 PROCEDURE — 3725F SCREEN DEPRESSION PERFORMED: CPT | Performed by: NURSE PRACTITIONER

## 2021-03-23 PROCEDURE — 99214 OFFICE O/P EST MOD 30 MIN: CPT | Performed by: NURSE PRACTITIONER

## 2021-03-23 PROCEDURE — S0612 ANNUAL GYNECOLOGICAL EXAMINA: HCPCS | Performed by: OBSTETRICS & GYNECOLOGY

## 2021-03-23 PROCEDURE — 90471 IMMUNIZATION ADMIN: CPT

## 2021-03-23 PROCEDURE — 3008F BODY MASS INDEX DOCD: CPT | Performed by: NURSE PRACTITIONER

## 2021-03-23 PROCEDURE — 90732 PPSV23 VACC 2 YRS+ SUBQ/IM: CPT

## 2021-03-23 RX ORDER — FLUOXETINE 10 MG/1
10 CAPSULE ORAL DAILY
Qty: 30 CAPSULE | Refills: 0 | Status: SHIPPED | OUTPATIENT
Start: 2021-03-23 | End: 2021-10-06

## 2021-03-23 RX ORDER — ALENDRONATE SODIUM 70 MG/1
70 TABLET ORAL
Qty: 13 TABLET | Refills: 3 | Status: SHIPPED | OUTPATIENT
Start: 2021-03-23 | End: 2021-09-30 | Stop reason: HOSPADM

## 2021-03-23 NOTE — ASSESSMENT & PLAN NOTE
BP remains controlled on amlodipine, hctz  She continues to work on cutting back smoking, down to about 5 cigarettes per day  Reinforced etoh and marijuana cessation  Will repeat labs and continue to monitor

## 2021-03-23 NOTE — PROGRESS NOTES
Assessment/Plan:    Essential hypertension  BP remains controlled on amlodipine, hctz  She continues to work on cutting back smoking, down to about 5 cigarettes per day  Reinforced etoh and marijuana cessation  Will repeat labs and continue to monitor  Tobacco abuse  Use down to 5 cigarettes per day, continue to encourage working on complete remission  Alcohol use  Per pt she is drinking 6 pack of beer every night, though says she has not had any in the last 3 days  Encouraged she continue to refrain from etoh consumption especially with her depression symptoms  Reviewed that intake of large volumes of etoh would be risky for abuse and addiction in the setting of depression  She says she understands and knows it is not helpful to her and intends to avoid etoh  Hyperlipidemia  Continue atorvastatin, repeat lipid panel for evaluation  Continue to encourage tobacco cessation  Reactive depression  Pt has been feeling depressed since her son  over a year ago and has also lost her uncle and her mother is now on hospice  PHQ score 16 positive for depression  She is not suicidal   Encouraged avoidance of etoh and marijuana  Pt agreeable to counseling and medication, discussed these at length  Will start her on prozac and referred for counseling  Pt knows to call if sx worsen  Will f/u in 1 month  Diagnoses and all orders for this visit:    Reactive depression  -     Ambulatory referral to [facility]; Future  -     FLUoxetine (PROzac) 10 mg capsule; Take 1 capsule (10 mg total) by mouth daily  -     CBC and differential; Future  -     TSH, 3rd generation with Free T4 reflex; Future    Essential hypertension  -     Comprehensive metabolic panel; Future  -     CBC and differential; Future    Hyperlipidemia, unspecified hyperlipidemia type  -     Lipid panel;  Future    Screening for diabetes mellitus  -     Hemoglobin A1C; Future    Encounter for immunization  -     PNEUMOCOCCAL POLYSACCHARIDE VACCINE 23-VALENT =>1YO SQ IM    Tobacco abuse    Alcohol use          Subjective:      Patient ID: Murali Beckwith is a 61 y o  female  Pt is a 60 y/o female here for follow up  PMH of HTN, HLD, tobacco abuse, etoh use, marijuana use  Compliant with medications  She is extremely depressed; lost her son about a year and a half ago and her uncle this year, now her mother is in hospice and she does not know how long she has  She is not sleeping, not eating and she thought about getting herself admitted but she is afraid of losing her job  She reports etoh intake of 6 pack of beer per day, but says she has not had a drink in about 3 days  She has cut down tobacco use to about 5 cigarettes per day  She still smokes marijuana at times to help herself relax but says this is not a daily habit  Pt denies chest pain, palpitations, shortness of breath, headache, dizziness, numbness, tingling  The following portions of the patient's history were reviewed and updated as appropriate: allergies, current medications, past family history, past medical history, past social history, past surgical history and problem list     Review of Systems   Constitutional: Negative for appetite change, chills, fatigue and fever  Respiratory: Negative for shortness of breath  Cardiovascular: Negative for chest pain, palpitations and leg swelling  Gastrointestinal: Negative for abdominal pain, diarrhea, nausea and vomiting  Neurological: Negative for dizziness, light-headedness and headaches  Psychiatric/Behavioral: Positive for dysphoric mood and sleep disturbance  Negative for behavioral problems, decreased concentration, hallucinations, self-injury and suicidal ideas  The patient is not nervous/anxious and is not hyperactive            Objective:      /68   Pulse 82   Temp (!) 97 1 °F (36 2 °C) (Tympanic)   Resp 18   Ht 5' 3" (1 6 m)   Wt 48 9 kg (107 lb 12 8 oz)   LMP  (LMP Unknown) SpO2 98%   BMI 19 10 kg/m²          Physical Exam  Vitals signs reviewed  Constitutional:       General: She is awake  She is not in acute distress  Appearance: Normal appearance  She is well-developed, well-groomed and normal weight  She is not ill-appearing, toxic-appearing or diaphoretic  Eyes:      General: Lids are normal       Conjunctiva/sclera: Conjunctivae normal    Neck:      Thyroid: No thyroid mass, thyromegaly or thyroid tenderness  Vascular: Normal carotid pulses  No carotid bruit or JVD  Cardiovascular:      Rate and Rhythm: Normal rate and regular rhythm  Pulses: Normal pulses  Heart sounds: Normal heart sounds  No murmur  Pulmonary:      Effort: Pulmonary effort is normal       Breath sounds: Normal breath sounds  Abdominal:      General: Abdomen is flat  Bowel sounds are normal       Palpations: Abdomen is soft  Musculoskeletal: Normal range of motion  Right lower leg: No edema  Left lower leg: No edema  Skin:     General: Skin is warm and dry  Neurological:      Mental Status: She is alert and oriented to person, place, and time  Psychiatric:         Attention and Perception: Attention normal          Mood and Affect: Mood is depressed  Affect is tearful  Speech: Speech normal          Behavior: Behavior normal  Behavior is cooperative  Thought Content:  Thought content normal          Cognition and Memory: Cognition normal          Judgment: Judgment normal

## 2021-03-23 NOTE — ASSESSMENT & PLAN NOTE
Pt has been feeling depressed since her son  over a year ago and has also lost her uncle and her mother is now on hospice  PHQ score 16 positive for depression  She is not suicidal   Encouraged avoidance of etoh and marijuana  Pt agreeable to counseling and medication, discussed these at length  Will start her on prozac and referred for counseling  Pt knows to call if sx worsen  Will f/u in 1 month

## 2021-03-30 ENCOUNTER — LAB (OUTPATIENT)
Dept: LAB | Facility: HOSPITAL | Age: 61
End: 2021-03-30
Payer: COMMERCIAL

## 2021-03-30 ENCOUNTER — TELEPHONE (OUTPATIENT)
Dept: INTERNAL MEDICINE CLINIC | Facility: CLINIC | Age: 61
End: 2021-03-30

## 2021-03-30 ENCOUNTER — HOSPITAL ENCOUNTER (INPATIENT)
Facility: HOSPITAL | Age: 61
LOS: 1 days | Discharge: HOME | DRG: 641 | End: 2021-04-01
Attending: EMERGENCY MEDICINE | Admitting: INTERNAL MEDICINE
Payer: COMMERCIAL

## 2021-03-30 DIAGNOSIS — F32.9 REACTIVE DEPRESSION: ICD-10-CM

## 2021-03-30 DIAGNOSIS — R77.8 ELEVATED TROPONIN: ICD-10-CM

## 2021-03-30 DIAGNOSIS — I10 ESSENTIAL HYPERTENSION: ICD-10-CM

## 2021-03-30 DIAGNOSIS — I21.4 NSTEMI (NON-ST ELEVATED MYOCARDIAL INFARCTION) (HCC): ICD-10-CM

## 2021-03-30 DIAGNOSIS — R79.0 ABNORMAL IRON SATURATION: ICD-10-CM

## 2021-03-30 DIAGNOSIS — E87.6 HYPOKALEMIA: Primary | ICD-10-CM

## 2021-03-30 DIAGNOSIS — Z13.1 SCREENING FOR DIABETES MELLITUS: ICD-10-CM

## 2021-03-30 DIAGNOSIS — D64.9 ANEMIA: ICD-10-CM

## 2021-03-30 DIAGNOSIS — E78.5 HYPERLIPIDEMIA, UNSPECIFIED HYPERLIPIDEMIA TYPE: ICD-10-CM

## 2021-03-30 PROBLEM — E87.1 HYPONATREMIA: Status: ACTIVE | Noted: 2021-03-30

## 2021-03-30 PROBLEM — R79.89 ELEVATED TROPONIN: Status: ACTIVE | Noted: 2021-03-30

## 2021-03-30 LAB
ACANTHOCYTES BLD QL SMEAR: PRESENT
ALBUMIN SERPL BCP-MCNC: 3.5 G/DL (ref 3.5–5)
ALBUMIN SERPL BCP-MCNC: 4.2 G/DL (ref 3.5–5)
ALP SERPL-CCNC: 62 U/L (ref 46–116)
ALP SERPL-CCNC: 73 U/L (ref 46–116)
ALT SERPL W P-5'-P-CCNC: 15 U/L (ref 12–78)
ALT SERPL W P-5'-P-CCNC: 17 U/L (ref 12–78)
ANION GAP SERPL CALCULATED.3IONS-SCNC: 6 MMOL/L (ref 4–13)
ANION GAP SERPL CALCULATED.3IONS-SCNC: 7 MMOL/L (ref 4–13)
ANISOCYTOSIS BLD QL SMEAR: PRESENT
AST SERPL W P-5'-P-CCNC: 20 U/L (ref 5–45)
AST SERPL W P-5'-P-CCNC: 22 U/L (ref 5–45)
BASOPHILS # BLD MANUAL: 0 THOUSAND/UL (ref 0–0.1)
BASOPHILS # BLD MANUAL: 0.07 THOUSAND/UL (ref 0–0.1)
BASOPHILS NFR MAR MANUAL: 0 % (ref 0–1)
BASOPHILS NFR MAR MANUAL: 1 % (ref 0–1)
BILIRUB SERPL-MCNC: 0.45 MG/DL (ref 0.2–1)
BILIRUB SERPL-MCNC: 0.56 MG/DL (ref 0.2–1)
BUN SERPL-MCNC: 6 MG/DL (ref 5–25)
BUN SERPL-MCNC: 7 MG/DL (ref 5–25)
BURR CELLS BLD QL SMEAR: PRESENT
CALCIUM SERPL-MCNC: 10.1 MG/DL (ref 8.3–10.1)
CALCIUM SERPL-MCNC: 9.2 MG/DL (ref 8.3–10.1)
CHLORIDE SERPL-SCNC: 92 MMOL/L (ref 100–108)
CHLORIDE SERPL-SCNC: 93 MMOL/L (ref 100–108)
CHOLEST SERPL-MCNC: 166 MG/DL (ref 50–200)
CO2 SERPL-SCNC: 34 MMOL/L (ref 21–32)
CO2 SERPL-SCNC: 34 MMOL/L (ref 21–32)
CREAT SERPL-MCNC: 0.63 MG/DL (ref 0.6–1.3)
CREAT SERPL-MCNC: 0.84 MG/DL (ref 0.6–1.3)
EOSINOPHIL # BLD MANUAL: 0 THOUSAND/UL (ref 0–0.4)
EOSINOPHIL # BLD MANUAL: 0 THOUSAND/UL (ref 0–0.4)
EOSINOPHIL NFR BLD MANUAL: 0 % (ref 0–6)
EOSINOPHIL NFR BLD MANUAL: 0 % (ref 0–6)
ERYTHROCYTE [DISTWIDTH] IN BLOOD BY AUTOMATED COUNT: 13.3 % (ref 11.6–15.1)
ERYTHROCYTE [DISTWIDTH] IN BLOOD BY AUTOMATED COUNT: 13.3 % (ref 11.6–15.1)
EST. AVERAGE GLUCOSE BLD GHB EST-MCNC: 97 MG/DL
FERRITIN SERPL-MCNC: 41 NG/ML (ref 8–388)
GFR SERPL CREATININE-BSD FRML MDRD: 113 ML/MIN/1.73SQ M
GFR SERPL CREATININE-BSD FRML MDRD: 87 ML/MIN/1.73SQ M
GLUCOSE P FAST SERPL-MCNC: 81 MG/DL (ref 65–99)
GLUCOSE SERPL-MCNC: 84 MG/DL (ref 65–140)
HBA1C MFR BLD: 5 %
HCT VFR BLD AUTO: 29.6 % (ref 34.8–46.1)
HCT VFR BLD AUTO: 31.7 % (ref 34.8–46.1)
HDLC SERPL-MCNC: 89 MG/DL
HGB BLD-MCNC: 10 G/DL (ref 11.5–15.4)
HGB BLD-MCNC: 10.8 G/DL (ref 11.5–15.4)
IRON SATN MFR SERPL: 16 %
IRON SERPL-MCNC: 53 UG/DL (ref 50–170)
LDLC SERPL CALC-MCNC: 65 MG/DL (ref 0–100)
LYMPHOCYTES # BLD AUTO: 1.03 THOUSAND/UL (ref 0.6–4.47)
LYMPHOCYTES # BLD AUTO: 14 % (ref 14–44)
LYMPHOCYTES # BLD AUTO: 2.03 THOUSAND/UL (ref 0.6–4.47)
LYMPHOCYTES # BLD AUTO: 24 % (ref 14–44)
MAGNESIUM SERPL-MCNC: 2 MG/DL (ref 1.6–2.6)
MCH RBC QN AUTO: 31.3 PG (ref 26.8–34.3)
MCH RBC QN AUTO: 31.6 PG (ref 26.8–34.3)
MCHC RBC AUTO-ENTMCNC: 33.8 G/DL (ref 31.4–37.4)
MCHC RBC AUTO-ENTMCNC: 34.1 G/DL (ref 31.4–37.4)
MCV RBC AUTO: 93 FL (ref 82–98)
MCV RBC AUTO: 93 FL (ref 82–98)
MONOCYTES # BLD AUTO: 0.25 THOUSAND/UL (ref 0–1.22)
MONOCYTES # BLD AUTO: 0.44 THOUSAND/UL (ref 0–1.22)
MONOCYTES NFR BLD: 3 % (ref 4–12)
MONOCYTES NFR BLD: 6 % (ref 4–12)
NEUTROPHILS # BLD MANUAL: 5.23 THOUSAND/UL (ref 1.85–7.62)
NEUTROPHILS # BLD MANUAL: 5.65 THOUSAND/UL (ref 1.85–7.62)
NEUTS SEG NFR BLD AUTO: 67 % (ref 43–75)
NEUTS SEG NFR BLD AUTO: 71 % (ref 43–75)
NONHDLC SERPL-MCNC: 77 MG/DL
NRBC BLD AUTO-RTO: 0 /100 WBCS
NRBC BLD AUTO-RTO: 0 /100 WBCS
PLATELET # BLD AUTO: 489 THOUSANDS/UL (ref 149–390)
PLATELET # BLD AUTO: 507 THOUSANDS/UL (ref 149–390)
PLATELET BLD QL SMEAR: ABNORMAL
PLATELET BLD QL SMEAR: ABNORMAL
PMV BLD AUTO: 9.7 FL (ref 8.9–12.7)
PMV BLD AUTO: 9.9 FL (ref 8.9–12.7)
POIKILOCYTOSIS BLD QL SMEAR: PRESENT
POIKILOCYTOSIS BLD QL SMEAR: PRESENT
POTASSIUM SERPL-SCNC: 2.1 MMOL/L (ref 3.5–5.3)
POTASSIUM SERPL-SCNC: 2.2 MMOL/L (ref 3.5–5.3)
PROT SERPL-MCNC: 6.9 G/DL (ref 6.4–8.2)
PROT SERPL-MCNC: 7.8 G/DL (ref 6.4–8.2)
RBC # BLD AUTO: 3.2 MILLION/UL (ref 3.81–5.12)
RBC # BLD AUTO: 3.42 MILLION/UL (ref 3.81–5.12)
RBC MORPH BLD: PRESENT
SODIUM SERPL-SCNC: 133 MMOL/L (ref 136–145)
SODIUM SERPL-SCNC: 133 MMOL/L (ref 136–145)
TARGETS BLD QL SMEAR: PRESENT
TARGETS BLD QL SMEAR: PRESENT
TIBC SERPL-MCNC: 323 UG/DL (ref 250–450)
TOTAL CELLS COUNTED SPEC: 100
TRIGL SERPL-MCNC: 58 MG/DL
TROPONIN I SERPL-MCNC: 0.07 NG/ML
TROPONIN I SERPL-MCNC: 0.08 NG/ML
TSH SERPL DL<=0.05 MIU/L-ACNC: 1.29 UIU/ML (ref 0.36–3.74)
VARIANT LYMPHS # BLD AUTO: 6 %
VARIANT LYMPHS # BLD AUTO: 8 %
WBC # BLD AUTO: 7.36 THOUSAND/UL (ref 4.31–10.16)
WBC # BLD AUTO: 8.44 THOUSAND/UL (ref 4.31–10.16)

## 2021-03-30 PROCEDURE — 83520 IMMUNOASSAY QUANT NOS NONAB: CPT

## 2021-03-30 PROCEDURE — 83735 ASSAY OF MAGNESIUM: CPT | Performed by: EMERGENCY MEDICINE

## 2021-03-30 PROCEDURE — 84484 ASSAY OF TROPONIN QUANT: CPT | Performed by: FAMILY MEDICINE

## 2021-03-30 PROCEDURE — 85027 COMPLETE CBC AUTOMATED: CPT

## 2021-03-30 PROCEDURE — 99284 EMERGENCY DEPT VISIT MOD MDM: CPT

## 2021-03-30 PROCEDURE — 84484 ASSAY OF TROPONIN QUANT: CPT | Performed by: EMERGENCY MEDICINE

## 2021-03-30 PROCEDURE — 36415 COLL VENOUS BLD VENIPUNCTURE: CPT

## 2021-03-30 PROCEDURE — 83540 ASSAY OF IRON: CPT | Performed by: FAMILY MEDICINE

## 2021-03-30 PROCEDURE — 99220 PR INITIAL OBSERVATION CARE/DAY 70 MINUTES: CPT | Performed by: FAMILY MEDICINE

## 2021-03-30 PROCEDURE — 85027 COMPLETE CBC AUTOMATED: CPT | Performed by: EMERGENCY MEDICINE

## 2021-03-30 PROCEDURE — 83036 HEMOGLOBIN GLYCOSYLATED A1C: CPT

## 2021-03-30 PROCEDURE — 80053 COMPREHEN METABOLIC PANEL: CPT

## 2021-03-30 PROCEDURE — 85007 BL SMEAR W/DIFF WBC COUNT: CPT | Performed by: EMERGENCY MEDICINE

## 2021-03-30 PROCEDURE — 80053 COMPREHEN METABOLIC PANEL: CPT | Performed by: EMERGENCY MEDICINE

## 2021-03-30 PROCEDURE — 80061 LIPID PANEL: CPT

## 2021-03-30 PROCEDURE — 96368 THER/DIAG CONCURRENT INF: CPT

## 2021-03-30 PROCEDURE — 85007 BL SMEAR W/DIFF WBC COUNT: CPT

## 2021-03-30 PROCEDURE — 96365 THER/PROPH/DIAG IV INF INIT: CPT

## 2021-03-30 PROCEDURE — 82728 ASSAY OF FERRITIN: CPT | Performed by: FAMILY MEDICINE

## 2021-03-30 PROCEDURE — 83550 IRON BINDING TEST: CPT | Performed by: FAMILY MEDICINE

## 2021-03-30 PROCEDURE — 99291 CRITICAL CARE FIRST HOUR: CPT | Performed by: EMERGENCY MEDICINE

## 2021-03-30 PROCEDURE — 93005 ELECTROCARDIOGRAM TRACING: CPT

## 2021-03-30 PROCEDURE — 84443 ASSAY THYROID STIM HORMONE: CPT

## 2021-03-30 RX ORDER — AMLODIPINE BESYLATE 10 MG/1
10 TABLET ORAL DAILY
Status: DISCONTINUED | OUTPATIENT
Start: 2021-03-31 | End: 2021-04-01 | Stop reason: HOSPADM

## 2021-03-30 RX ORDER — NICOTINE 21 MG/24HR
1 PATCH, TRANSDERMAL 24 HOURS TRANSDERMAL DAILY
Status: DISCONTINUED | OUTPATIENT
Start: 2021-03-31 | End: 2021-04-01 | Stop reason: HOSPADM

## 2021-03-30 RX ORDER — POTASSIUM CHLORIDE 14.9 MG/ML
20 INJECTION INTRAVENOUS
Status: COMPLETED | OUTPATIENT
Start: 2021-03-30 | End: 2021-03-30

## 2021-03-30 RX ORDER — ASPIRIN 81 MG/1
324 TABLET, CHEWABLE ORAL ONCE
Status: COMPLETED | OUTPATIENT
Start: 2021-03-30 | End: 2021-03-30

## 2021-03-30 RX ORDER — FLUOXETINE 10 MG/1
10 CAPSULE ORAL DAILY
Status: DISCONTINUED | OUTPATIENT
Start: 2021-03-31 | End: 2021-04-01 | Stop reason: HOSPADM

## 2021-03-30 RX ORDER — ATORVASTATIN CALCIUM 20 MG/1
20 TABLET, FILM COATED ORAL
Status: DISCONTINUED | OUTPATIENT
Start: 2021-03-30 | End: 2021-04-01 | Stop reason: HOSPADM

## 2021-03-30 RX ORDER — MAGNESIUM SULFATE HEPTAHYDRATE 40 MG/ML
2 INJECTION, SOLUTION INTRAVENOUS ONCE
Status: COMPLETED | OUTPATIENT
Start: 2021-03-30 | End: 2021-03-30

## 2021-03-30 RX ORDER — MAGNESIUM HYDROXIDE/ALUMINUM HYDROXICE/SIMETHICONE 120; 1200; 1200 MG/30ML; MG/30ML; MG/30ML
30 SUSPENSION ORAL EVERY 4 HOURS PRN
Status: DISCONTINUED | OUTPATIENT
Start: 2021-03-30 | End: 2021-04-01 | Stop reason: HOSPADM

## 2021-03-30 RX ORDER — ACETAMINOPHEN 325 MG/1
650 TABLET ORAL EVERY 6 HOURS PRN
Status: DISCONTINUED | OUTPATIENT
Start: 2021-03-30 | End: 2021-04-01 | Stop reason: HOSPADM

## 2021-03-30 RX ORDER — POTASSIUM CHLORIDE 20 MEQ/1
40 TABLET, EXTENDED RELEASE ORAL ONCE
Status: COMPLETED | OUTPATIENT
Start: 2021-03-30 | End: 2021-03-30

## 2021-03-30 RX ORDER — LIDOCAINE HYDROCHLORIDE 20 MG/ML
15 SOLUTION OROPHARYNGEAL ONCE
Status: COMPLETED | OUTPATIENT
Start: 2021-03-30 | End: 2021-03-30

## 2021-03-30 RX ORDER — LIDOCAINE HYDROCHLORIDE 20 MG/ML
15 SOLUTION OROPHARYNGEAL ONCE
Status: DISCONTINUED | OUTPATIENT
Start: 2021-03-30 | End: 2021-03-30

## 2021-03-30 RX ORDER — SIMETHICONE 80 MG
80 TABLET,CHEWABLE ORAL EVERY 6 HOURS PRN
Status: DISCONTINUED | OUTPATIENT
Start: 2021-03-30 | End: 2021-03-30

## 2021-03-30 RX ADMIN — ACETAMINOPHEN 650 MG: 325 TABLET, FILM COATED ORAL at 23:57

## 2021-03-30 RX ADMIN — ASPIRIN 324 MG: 81 TABLET, CHEWABLE ORAL at 16:14

## 2021-03-30 RX ADMIN — ATORVASTATIN CALCIUM 20 MG: 20 TABLET, FILM COATED ORAL at 17:32

## 2021-03-30 RX ADMIN — POTASSIUM CHLORIDE 40 MEQ: 1500 TABLET, EXTENDED RELEASE ORAL at 14:36

## 2021-03-30 RX ADMIN — POTASSIUM CHLORIDE 20 MEQ: 14.9 INJECTION, SOLUTION INTRAVENOUS at 14:38

## 2021-03-30 RX ADMIN — MAGNESIUM SULFATE HEPTAHYDRATE 2 G: 40 INJECTION, SOLUTION INTRAVENOUS at 14:57

## 2021-03-30 RX ADMIN — POTASSIUM CHLORIDE 40 MEQ: 1500 TABLET, EXTENDED RELEASE ORAL at 17:28

## 2021-03-30 RX ADMIN — POTASSIUM CHLORIDE 20 MEQ: 14.9 INJECTION, SOLUTION INTRAVENOUS at 16:45

## 2021-03-30 RX ADMIN — LIDOCAINE HYDROCHLORIDE 15 ML: 20 SOLUTION ORAL; TOPICAL at 23:57

## 2021-03-30 RX ADMIN — ALUMINUM HYDROXIDE, MAGNESIUM HYDROXIDE, AND SIMETHICONE 30 ML: 200; 200; 20 SUSPENSION ORAL at 23:57

## 2021-03-30 NOTE — ASSESSMENT & PLAN NOTE
· Hypokalemia noted on outpatient labs, referred to ED by PCP   2 1 on admission  Magnesium 2 0   · Was given 40 mEq intravenous as well as 40 p o  · Potassium 2 7 today  Giving an additional 40 intravenous and 40 p o  Yuliya Shipman · Continue telemetry until potassium normalized, no events  · BMP in a m

## 2021-03-30 NOTE — TELEPHONE ENCOUNTER
The labs called and patient has critical lab results for potassium  It is 2 1 and it was collected at 1050 am   It is in epic

## 2021-03-30 NOTE — LETTER
[address] [facility] [address]  [address]  [place] [address]  Dept: [phone]    April 1, 2021     Patient: Maria D Moreno   YOB: 1960   Date of Visit: 3/30/2021       To Whom it May Concern:    Maria D Moreno is under my professional care  She was seen in the hospital from 3/30/2021   to 04/01/21  She may return to work on 4/3 on light duty for 1 week  If you have any questions or concerns, please don't hesitate to call           Sincerely,          Darryle Rod, PA-C

## 2021-03-30 NOTE — ASSESSMENT & PLAN NOTE
· Troponin elevated 0 08/0 07  Flat  No complaints of chest pain, shortness of breath  Additionally, patient has an extremely strenuous job where she is consistently lifting, pushing, pulling walking up multiple flights of stairs without any difficulty, no chest pain/shortness of breath    · EKG showed ST depressions which are not significantly different from previous EKG on 1/21/2021  · No events on telemetry  · Will check echocardiogram for completeness

## 2021-03-30 NOTE — H&P
History and Physical - [facility] Internal Medicine    Patient Information: Fiona Haque 61 y o  female MRN: [id number]  Unit/Bed#: ED 15 Encounter: [id number]  Admitting Physician: Phyllis Tellez  PCP: THONG Alatorre  Date of Admission:  03/30/21    Assessment/Plan:    Hypokalemia  Assessment & Plan  K 2 1  Repeat CBC/BMP tomorrow  Potassium chloride 40 mequ  Potassium chloride 40 mequ PO ordered      Elevated troponin  Assessment & Plan  Troponin elevated 0 08  EKG showed ST depressions which are not significantly different from previous EKG on 1/21/2021  Trend Troponin levels   48 hour telemetry  Hold Heparin, until obtain second troponin level    Essential hypertension  Assessment & Plan  Controlled   Hydrochlorothiazide stopped and reassess  May benefit from a different agent  Hyponatremia  Assessment & Plan  Na 133  Repeat BMP tomorrow    Anemia  Assessment & Plan  Trend labs  CBC tomorrow    Hyperlipidemia  Assessment & Plan  Atorvastatin      Tobacco abuse  Assessment & Plan  Nicotine patch       VTE Prophylaxis: Enoxaparin (Lovenox)  / sequential compression device   Code Status: full code  POLST: POLST form is not discussed and not completed at this time  Anticipated Length of Stay:  Patient will be admitted on an Observation basis with an anticipated length of stay of not more than 2 midnights  Justification for Hospital Stay: hypokalemia, elevated troponin    Total Time for Visit, including Counseling / Coordination of Care: 1 hour  Greater than 50% of this total time spent on direct patient counseling and coordination of care  Chief Complaint:  Hypokalemia 2 1    History of Present Illness:  Fiona Haque is a 61year old female with medical hx of hypertension, marijuana and alcohol abuse (quit 3 weeks ago), who presents to the ED for hypokalemia  She was alerted by her PCP, after receiving blood work results    She denies any symptoms, at this time and states that she is in her normal health  Denies any recent medication changes  Potassium level: 2 1  Review of Systems:  Review of Systems   Constitutional: Negative  HENT: Negative  Eyes: Negative  Respiratory: Negative  Cardiovascular: Negative  Gastrointestinal: Negative  Endocrine: Negative  Genitourinary: Negative  Musculoskeletal: Negative  Skin: Negative  Neurological: Negative  Hematological: Negative  Psychiatric/Behavioral: Negative  Past Medical and Surgical History:     Past Medical History:   Diagnosis Date    Anemia 3/30/2021    Hypertension     Shingles     Smoking 1/2 pack a day or less        Past Surgical History:   Procedure Laterality Date    BREAST CYST EXCISION Left     done in 3731I    CAST APPLICATION Right 95/9/2624    Procedure: APPLICATION SHORT ARM ULNAR GAUNTLET CAST;  Surgeon: Gem Carmichael MD;  [facility]: [facility];  Service: Orthopedics    [place] OPEN TX PHALANGEAL SHAFT FRACTURE PROX/MIDDLE EA Right 12/1/2020    Procedure: CLOSED REDUCTION AND PINNING OF RIGHT RING FINGER PROXIMAL PHALANX FRACTURE;  Surgeon: Gem Carmichael MD;  Location: [facility];  Service: Orthopedics       Meds/Allergies:    Prior to Admission medications    Medication Sig Start Date End Date Taking?  Authorizing Provider   alendronate (FOSAMAX) 70 mg tablet Take 1 tablet (70 mg total) by mouth every 7 days 3/23/21   Cristiana Mendoza MD   amLODIPine (NORVASC) 10 mg tablet take 1 tablet by mouth once daily 3/15/21   THONG Lal   atorvastatin (LIPITOR) 20 mg tablet take 1 tablet by mouth once daily with dinner 3/15/21   THONG Lal   ergocalciferol (VITAMIN D2) 50,000 units Take 1 capsule (50,000 Units total) by mouth once a week for 12 doses 10/8/20 12/30/20  Cristiana Mendoza MD   FLUoxetine (PROzac) 10 mg capsule Take 1 capsule (10 mg total) by mouth daily 3/23/21   THONG Lal   hydrochlorothiazide (HYDRODIURIL) 25 mg tablet take 1 tablet by mouth once daily 3/15/21   THONG Lal     I have reviewed home medications with patient personally  Allergies: Allergies   Allergen Reactions    Lisinopril        Social History:     Marital Status: /Civil Union   Occupation:   Patient Pre-hospital Living Situation: lives with   Patient Pre-hospital Level of Mobility: mobile  Patient Pre-hospital Diet Restrictions: none  Substance Use History:   Social History     Substance and Sexual Activity   Alcohol Use Yes    Alcohol/week: 2 0 standard drinks    Types: 2 Cans of beer per week    Frequency: 4 or more times a week    Drinks per session: 5 or 6     Social History     Tobacco Use   Smoking Status Current Every Day Smoker    Packs/day: 0 50    Years: 25 00    Pack years: 12 50    Types: Cigarettes   Smokeless Tobacco Never Used   Tobacco Comment    cutting back with cigarette     Social History     Substance and Sexual Activity   Drug Use Not Currently    Frequency: 2 0 times per week    Types: Marijuana       Family History:    non-contributory    Physical Exam:     Physical Exam  Constitutional:       Appearance: Normal appearance  She is normal weight  HENT:      Head: Normocephalic and atraumatic  Nose: Nose normal       Mouth/Throat:      Mouth: Mucous membranes are moist    Eyes:      Extraocular Movements: Extraocular movements intact  Conjunctiva/sclera: Conjunctivae normal       Pupils: Pupils are equal, round, and reactive to light  Neck:      Musculoskeletal: Normal range of motion and neck supple  Cardiovascular:      Rate and Rhythm: Normal rate and regular rhythm  Pulses: Normal pulses  Heart sounds: Normal heart sounds  Pulmonary:      Effort: Pulmonary effort is normal       Breath sounds: Normal breath sounds  Abdominal:      General: Abdomen is flat  Palpations: Abdomen is soft  Musculoskeletal: Normal range of motion  Skin:     General: Skin is warm and dry  Neurological:      General: No focal deficit present  Mental Status: She is alert and oriented to person, place, and time  Psychiatric:         Mood and Affect: Mood normal          Behavior: Behavior normal          Thought Content: Thought content normal          Judgment: Judgment normal          Vitals:   Blood Pressure: 142/67 (03/30/21 1612)  Pulse: 86 (03/30/21 1612)  Temperature: 98 4 °F (36 9 °C) (03/30/21 1404)  Temp Source: Oral (03/30/21 1404)  Respirations: 22 (03/30/21 1612)  Weight - Scale: 50 1 kg (110 lb 7 2 oz) (03/30/21 1404)  SpO2: 100 % (03/30/21 1612)      Additional Data:     Lab Results: I have personally reviewed pertinent reports  Results from last 7 days   Lab Units 03/30/21  1435   WBC Thousand/uL 8 44   HEMOGLOBIN g/dL 10 8*   HEMATOCRIT % 31 7*   PLATELETS Thousands/uL 507*   LYMPHO PCT % 24   MONO PCT % 3*   EOS PCT % 0     Results from last 7 days   Lab Units 03/30/21  1435   SODIUM mmol/L 133*   POTASSIUM mmol/L 2 2*   CHLORIDE mmol/L 93*   CO2 mmol/L 34*   BUN mg/dL 7   CREATININE mg/dL 0 84   CALCIUM mg/dL 10 1   ALK PHOS U/L 73   ALT U/L 17   AST U/L 22           Imaging: I have personally reviewed pertinent reports  No results found  EKG, Pathology, and Other Studies Reviewed on Admission:   · EKG:  ST depressions not significantly different than previous EKG on 1/21/2021    [facility] / [facility] Records Reviewed: Yes     ** Please Note: This note has been constructed using a voice recognition system   **

## 2021-03-30 NOTE — ED ATTENDING ATTESTATION
Final Diagnosis:  1  Hypokalemia    2  Elevated troponin    3  NSTEMI (non-ST elevated myocardial infarction) ([facility] [address] )    4  Anemia      ED Course as of Apr 06 1327   Tue Mar 30, 2021   1505 WBC: 8 44   1505 Hemoglobin(!): 10 8   1505 Platelet Count(!): 111       I, Loy Hodges MD, saw and evaluated the patient  All available labs and X-rays were ordered by me or the resident and have been reviewed by myself  I discussed the patient with the resident / non-physician and agree with the resident's / non-physician practitioner's findings and plan as documented in the resident's / non-physician practicitioner's note, except where noted  At this point, I agree with the current assessment done in the ED  I was present during key portions of all procedures performed unless otherwise stated  Chief Complaint   Patient presents with    Abnormal Lab     pt reports got a call from PCP, potassium was low      This is a 61 y o  female presenting for evaluation of hypokalemia  The patient stopped drinking 3 weeks ago  She says she has been eating okay and drinking (not alcohol) okay  No f/ch/n/v/cp/sob  She eats soup and things  She goes to work  No new medications; is on HCTZ and is compliant  She is on a -dronate medication as well  Denies any urinary tract infection symptoms (burning, itching, pain, blood, frequency)  Denies any upper respiratory tract infection symptoms (cough, congestion, rhinorrhea, sore throat)  No falls/trauma  No tics / palpitations  PMH:   has a past medical history of Anemia (3/30/2021), Hypertension, Shingles, and Smoking 1/2 pack a day or less  PSH:   has a past surgical history that includes Breast cyst excision (Left); pr open tx phalangeal shaft fracture prox/middle ea (Right, 39/1/9020); and Cast application (Right, 45/2/4113)      Social:  Social History     Substance and Sexual Activity   Alcohol Use Yes    Alcohol/week: 2 0 standard drinks    Types: 2 Cans of beer per week    Frequency: 4 or more times a week    Drinks per session: 5 or 6     Social History     Tobacco Use   Smoking Status Current Every Day Smoker    Packs/day: 0 50    Years: 25 00    Pack years: 12 50    Types: Cigarettes   Smokeless Tobacco Never Used   Tobacco Comment    cutting back with cigarette     Social History     Substance and Sexual Activity   Drug Use Not Currently    Frequency: 2 0 times per week    Types: Marijuana     PE:  Vitals:    03/31/21 1254 03/31/21 1853 03/31/21 2212 04/01/21 0748   BP: 147/92 139/89 140/90 119/81   Pulse: 95 84 82 100   Resp: 16  16    Temp: 98 5 °F (36 9 °C)  97 7 °F (36 5 °C) 97 9 °F (36 6 °C)   TempSrc:       SpO2: 100% 99% 100% 100%   Weight:       Height:       General: VS reviewed  Appears in NAD  awake, alert  Well-nourished, well-developed  Appears stated age  Speaking normally in full sentences  Head: Normocephalic, atraumatic  Eyes: EOM-I  No diplopia  No hyphema  No subconjunctival hemorrhages  Symmetrical lids  ENT: Atraumatic external nose and ears  MMM  No malocclusion  No stridor  Normal phonation  No drooling  Normal swallowing  Neck: No JVD  CV: No pallor noted  Lungs:   No tachypnea  No respiratory distress  MSK:   FROM spontaneously  Skin: Dry, intact  Neuro: Awake, alert, GCS15, CN II-XII grossly intact  Motor grossly intact  Psychiatric/Behavioral: Appropriate mood and affect   Exam: deferred  A:  - Hypokalemia  P:  - Give K+ and Mg++  - Recheck levels  (VERY unlikely to be artificial low, much more likely artificial high; will recheck though)  - Will give oral + IV    - Will admit primarily for telemetry  - EKG with STD diffusely  Unclear why  Couldn't be K+ related but that's unusual      - 13 point ROS was performed and all are normal unless stated in the history above  - Nursing note reviewed  Vitals reviewed     - Orders placed by myself and/or advanced practitioner / resident     - Previous chart was reviewed  - No language barrier    - History obtained from patient  - There are no limitations to the history obtained  - Critical care time: 33 minutes  - Critical care time was exclusive of seperately bilable procedures and treating other patients as well as teaching time     - Critical care was necessary to treat or prevent imminent or life-threatening deterioration of the following condition: hypokalemia  - Critical care time was spent personally by me on the following activities as well as the above as per the ED course and rest of chart: blood draw for specimens, obtaining history from patient / surrogate, developement of a treatment plan, discussions with consultants, evaluation of patient's response to the treatment, examination of the patient, ordering/performing treatements and interventions, re-evaluation of the patient's condition, review of old charts, ordering/reviewing laboratory studies, ordering/reviewing of radiographic studies    Code Status: Prior  Advance Directive and Living Will:      Power of :    POLST:      Medications   potassium chloride (K-DUR,KLOR-CON) CR tablet 40 mEq (40 mEq Oral Given 3/30/21 1436)   potassium chloride 20 mEq IVPB (premix) (0 mEq Intravenous Stopped 3/30/21 2310)   magnesium sulfate 2 g/50 mL IVPB (premix) 2 g (0 g Intravenous Stopped 3/30/21 1718)   aspirin chewable tablet 324 mg (324 mg Oral Given 3/30/21 1614)   potassium chloride (K-DUR,KLOR-CON) CR tablet 40 mEq (40 mEq Oral Given 3/30/21 1728)   Lidocaine Viscous HCl (XYLOCAINE) 2 % mucosal solution 15 mL (15 mL Swish & Swallow Given 3/30/21 2357)   potassium chloride (K-DUR,KLOR-CON) CR tablet 40 mEq (40 mEq Oral Given 3/31/21 0802)   potassium chloride 20 mEq IVPB (premix) (0 mEq Intravenous Stopped 4/1/21 1158)   ALPRAZolam (XANAX) tablet 0 25 mg (0 25 mg Oral Given 3/31/21 2206)   potassium chloride (K-DUR,KLOR-CON) CR tablet 40 mEq (40 mEq Oral Given 4/1/21 0756)   sodium chloride 0 9 % bolus 250 mL (0 mL Intravenous Stopped 4/1/21 1157)     No orders to display     Orders Placed This Encounter   Procedures    CBC and differential    Comprehensive metabolic panel    Magnesium    Troponin I    CBC and differential    Basic metabolic panel    Iron Saturation %    Ferritin    Basic metabolic panel    Basic metabolic panel    Discharge Diet    Notify admitting physician    Notify admitting physician on arrival    Activity as tolerated    Call provider for:  persistent nausea or vomiting    Call provider for:  persistent dizziness or light-headedness    Call provider for:  difficulty breathing, headache or visual disturbances    Call provider for:  extreme fatigue    EKG RESULTS    ECG 12 lead    ECG 12 lead    Echo complete with contrast if indicated    Place in Observation    Inpatient Admission    Discharge patient     Labs Reviewed   CBC AND DIFFERENTIAL - Abnormal       Result Value Ref Range Status    WBC 8 44  4 31 - 10 16 Thousand/uL Final    RBC 3 42 (*) 3 81 - 5 12 Million/uL Final    Hemoglobin 10 8 (*) 11 5 - 15 4 g/dL Final    Hematocrit 31 7 (*) 34 8 - 46 1 % Final    MCV 93  82 - 98 fL Final    MCH 31 6  26 8 - 34 3 pg Final    MCHC 34 1  31 4 - 37 4 g/dL Final    RDW 13 3  11 6 - 15 1 % Final    MPV 9 7  8 9 - 12 7 fL Final    Platelets 880 (*) 443 - 390 Thousands/uL Final    nRBC 0  /100 WBCs Final    Comment: This is an appended report  These results have been appended to a previously preliminary verified report  Narrative: This is an appended report  These results have been appended to a previously verified report     COMPREHENSIVE METABOLIC PANEL - Abnormal    Sodium 133 (*) 136 - 145 mmol/L Final    Potassium 2 2 (*) 3 5 - 5 3 mmol/L Final    Chloride 93 (*) 100 - 108 mmol/L Final    CO2 34 (*) 21 - 32 mmol/L Final    ANION GAP 6  4 - 13 mmol/L Final    BUN 7  5 - 25 mg/dL Final    Creatinine 0 84  0 60 - 1 30 mg/dL Final    Comment: Standardized to IDMS reference method    Glucose 84  65 - 140 mg/dL Final    Comment: If the patient is fasting, the ADA then defines impaired fasting glucose as > 100 mg/dL and diabetes as > or equal to 123 mg/dL  Specimen collection should occur prior to Sulfasalazine administration due to the potential for falsely depressed results  Specimen collection should occur prior to Sulfapyridine administration due to the potential for falsely elevated results  Calcium 10 1  8 3 - 10 1 mg/dL Final    AST 22  5 - 45 U/L Final    Comment: Specimen collection should occur prior to Sulfasalazine administration due to the potential for falsely depressed results  ALT 17  12 - 78 U/L Final    Comment: Specimen collection should occur prior to Sulfasalazine and/or Sulfapyridine administration due to the potential for falsely depressed results  Alkaline Phosphatase 73  46 - 116 U/L Final    Total Protein 7 8  6 4 - 8 2 g/dL Final    Albumin 4 2  3 5 - 5 0 g/dL Final    Total Bilirubin 0 45  0 20 - 1 00 mg/dL Final    Comment: Use of this assay is not recommended for patients undergoing treatment with eltrombopag due to the potential for falsely elevated results      eGFR 87  ml/min/1 73sq m Final    Narrative:     [place] guidelines for Chronic Kidney Disease (CKD):     Stage 1 with normal or high GFR (GFR > 90 mL/min/1 73 square meters)    Stage 2 Mild CKD (GFR = 60-89 mL/min/1 73 square meters)    Stage 3A Moderate CKD (GFR = 45-59 mL/min/1 73 square meters)    Stage 3B Moderate CKD (GFR = 30-44 mL/min/1 73 square meters)    Stage 4 Severe CKD (GFR = 15-29 mL/min/1 73 square meters)    Stage 5 End Stage CKD (GFR <15 mL/min/1 73 square meters)  Note: GFR calculation is accurate only with a steady state creatinine   TROPONIN I - Abnormal    Troponin I 0 08 (*) <=0 04 ng/mL Final    Comment: Siemens Chemistry analyzer 99% cutoff is > 0 04 ng/mL in network labs     o cTnI 99% cutoff is useful only when applied to patients in the clinical setting of myocardial ischemia   o cTnI 99% cutoff should be interpreted in the context of clinical history, ECG findings and possibly cardiac imaging to establish correct diagnosis  o cTnI 99% cutoff may be suggestive but clearly not indicative of a coronary event without the clinical setting of myocardial ischemia  Results indicate test should be repeated on new specimen collected within 4-6 hours of the original   TROPONIN I - Abnormal    Troponin I 0 07 (*) <=0 04 ng/mL Final    Comment: Siemens Chemistry analyzer 99% cutoff is > 0 04 ng/mL in network labs     o cTnI 99% cutoff is useful only when applied to patients in the clinical setting of myocardial ischemia   o cTnI 99% cutoff should be interpreted in the context of clinical history, ECG findings and possibly cardiac imaging to establish correct diagnosis  o cTnI 99% cutoff may be suggestive but clearly not indicative of a coronary event without the clinical setting of myocardial ischemia      Results indicate test should be repeated on new specimen collected within 4-6 hours of the original   MANUAL DIFFERENTIAL(PHLEBS DO NOT ORDER) - Abnormal    Segmented % 67  43 - 75 % Final    Lymphocytes % 24  14 - 44 % Final    Monocytes % 3 (*) 4 - 12 % Final    Eosinophils, % 0  0 - 6 % Final    Basophils % 0  0 - 1 % Final    Atypical Lymphocytes % 6 (*) <=0 % Final    Absolute Neutrophils 5 65  1 85 - 7 62 Thousand/uL Final    Lymphocytes Absolute 2 03  0 60 - 4 47 Thousand/uL Final    Monocytes Absolute 0 25  0 00 - 1 22 Thousand/uL Final    Eosinophils Absolute 0 00  0 00 - 0 40 Thousand/uL Final    Basophils Absolute 0 00  0 00 - 0 10 Thousand/uL Final    Total Counted 100   Final    Acanthocytes Present   Final    Anisocytosis Present   Final    [place] Cells Present   Final    Poikilocytes Present   Final    Target Cells Present   Final    Platelet Estimate Increased (*) Adequate Final   MAGNESIUM - Normal    Magnesium 2 0  1 6 - 2 6 mg/dL Final   FERRITIN - Normal    Ferritin 41  8 - 388 ng/mL Final   IRON SATURATION    Iron Saturation 16  % Final    TIBC 323  250 - 450 ug/dL Final    Iron 53  50 - 170 ug/dL Final    Comment: Patients treated with metal-binding drugs (ie  Deferoxamine) may have depressed iron values  TROPONIN I   IRON PANEL (INCLUDES FERRITIN,IRON SAT%, IRON, AND TIBC)    Narrative: The following orders were created for panel order Iron Panel (Includes Ferritin, Iron Sat%, Iron, and TIBC)  Procedure                               Abnormality         Status                     ---------                               -----------         ------                     Iron Saturation %[[id number]]                                Final result               Ferritin[[id number]]                     Normal              Final result                 Please view results for these tests on the individual orders  Time reflects when diagnosis was documented in both MDM as applicable and the Disposition within this note     Time User Action Codes Description Comment    3/30/2021  2:41 PM Johnny Holy Add [E87 6] Hypokalemia     3/30/2021  3:33 PM Hurt Mercy A Add [R77 8] Elevated troponin     3/30/2021  3:34 PM Hurt Mercy Add [I21 4] NSTEMI (non-ST elevated myocardial infarction) ([facility] [address] )     3/30/2021  3:45 PM Hurt Mercy A Add [D64 9] Anemia       ED Disposition     ED Disposition Condition Date/Time Comment    Admit Stable Tue Mar 30, 2021  3:56 PM Case was discussed with JENNYFER and the patient's admission status was agreed to be Admission Status: observation status to the service of Dr Carlos Puga          Follow-up Information     Follow up With Specialties Details Why [facility], [facility] Internal Medicine, Nurse Practitioner Follow up within 1 week  [address]  [phone]          Discharge Medication List as of 4/1/2021 11:57 AM START taking these medications    Details   potassium chloride (K-DUR,KLOR-CON) 20 mEq tablet Take 1 tablet (20 mEq total) by mouth daily, Starting Thu 4/1/2021, Normal         CONTINUE these medications which have NOT CHANGED    Details   alendronate (FOSAMAX) 70 mg tablet Take 1 tablet (70 mg total) by mouth every 7 days, Starting Tue 3/23/2021, Normal      amLODIPine (NORVASC) 10 mg tablet take 1 tablet by mouth once daily, Normal      atorvastatin (LIPITOR) 20 mg tablet take 1 tablet by mouth once daily with dinner, Normal      ergocalciferol (VITAMIN D2) 50,000 units Take 1 capsule (50,000 Units total) by mouth once a week for 12 doses, Starting Thu 10/8/2020, Until Wed 12/30/2020, Normal      FLUoxetine (PROzac) 10 mg capsule Take 1 capsule (10 mg total) by mouth daily, Starting Tue 3/23/2021, Normal         STOP taking these medications       hydrochlorothiazide (HYDRODIURIL) 25 mg tablet Comments:   Reason for Stopping:             Outpatient Discharge Orders   Basic metabolic panel   Standing Status: Future Standing Exp  Date: 04/01/22     Discharge Diet     Activity as tolerated     Call provider for:  persistent nausea or vomiting     Call provider for:  persistent dizziness or light-headedness     Call provider for:  difficulty breathing, headache or visual disturbances     Call provider for:  extreme fatigue     Prior to Admission Medications   Prescriptions Last Dose Informant Patient Reported? Taking?    FLUoxetine (PROzac) 10 mg capsule Unknown at Unknown time  No No   Sig: Take 1 capsule (10 mg total) by mouth daily   alendronate (FOSAMAX) 70 mg tablet Past Week at Unknown time  No Yes   Sig: Take 1 tablet (70 mg total) by mouth every 7 days   amLODIPine (NORVASC) 10 mg tablet 3/29/2021 at Unknown time  No Yes   Sig: take 1 tablet by mouth once daily   atorvastatin (LIPITOR) 20 mg tablet 3/30/2021 at Unknown time  No Yes   Sig: take 1 tablet by mouth once daily with dinner   ergocalciferol (VITAMIN D2) 50,000 units   No No   Sig: Take 1 capsule (50,000 Units total) by mouth once a week for 12 doses   hydrochlorothiazide (HYDRODIURIL) 25 mg tablet Unknown at Unknown time  No No   Sig: take 1 tablet by mouth once daily      Facility-Administered Medications: None       Portions of the record may have been created with voice recognition software  Occasional wrong word or "sound a like" substitutions may have occurred due to the inherent limitations of voice recognition software  Read the chart carefully and recognize, using context, where substitutions have occurred      Electronically signed by:  Lalito Voss

## 2021-03-30 NOTE — PROGRESS NOTES
Pt had routine labs done today, noted with critically low K at 2 1  Called pt to review; she is asymptomatic at present  Referred to the ER for evaluation and repletion  Pt verbalized understanding

## 2021-03-30 NOTE — ED PROVIDER NOTES
History  Chief Complaint   Patient presents with    Abnormal Lab     pt reports got a call from PCP, potassium was low      Patient a 61-year-old female, with a history significant for hypertension and quitting alcohol three weeks ago, who presents the ED today after being alerted by her PCP due to an abnormal potassium level:  2 1  Patient states that she is in her usual state of health and that she is without complaints  Patient denies fever, weakness, palpitations, chest pain, shortness of breath, abdominal pain, dysuria, decreased urine, seizure  She states that she is eating and drinking ok  Patient states takes her medications including hydrochlorothiazide  There are no clear exacerbating or remitting factors  There is no associated pain  The course and duration are unclear      - No language barrier    - History obtained from patient  - There are no limitations to the history obtained  - Previous charting was reviewed          Prior to Admission Medications   Prescriptions Last Dose Informant Patient Reported? Taking?    FLUoxetine (PROzac) 10 mg capsule   No No   Sig: Take 1 capsule (10 mg total) by mouth daily   alendronate (FOSAMAX) 70 mg tablet   No No   Sig: Take 1 tablet (70 mg total) by mouth every 7 days   amLODIPine (NORVASC) 10 mg tablet   No No   Sig: take 1 tablet by mouth once daily   atorvastatin (LIPITOR) 20 mg tablet   No No   Sig: take 1 tablet by mouth once daily with dinner   ergocalciferol (VITAMIN D2) 50,000 units   No No   Sig: Take 1 capsule (50,000 Units total) by mouth once a week for 12 doses   hydrochlorothiazide (HYDRODIURIL) 25 mg tablet   No No   Sig: take 1 tablet by mouth once daily      Facility-Administered Medications: None       Past Medical History:   Diagnosis Date    Hypertension     Shingles     Smoking 1/2 pack a day or less        Past Surgical History:   Procedure Laterality Date    BREAST CYST EXCISION Left     done in [facility]    CAST APPLICATION Right 12/1/2020    Procedure: APPLICATION SHORT ARM ULNAR GAUNTLET CAST;  Surgeon: Lynette Zuñiga MD;  Location:  MAIN OR;  Service: Orthopedics    [place] OPEN [address] PROX/MIDDLE EA Right 12/1/2020    Procedure: CLOSED REDUCTION AND PINNING OF RIGHT RING FINGER PROXIMAL PHALANX FRACTURE;  Surgeon: Lynette Zuñiga MD;  Location:  MAIN OR;  Service: Orthopedics       Family History   Problem Relation Age of Onset    Diabetes Mother     Alcohol abuse Father     Diabetes Sister     Diabetes Brother     No Known Problems Son     Substance Abuse Sister     Hypertension Sister     Diabetes Brother     No Known Problems Son     No Known Problems Son      I have reviewed and agree with the history as documented  E-Cigarette/Vaping    E-Cigarette Use Never User      E-Cigarette/Vaping Substances    Nicotine No     THC No     CBD No     Flavoring No     Other No     Unknown No      Social History     Tobacco Use    Smoking status: Current Every Day Smoker     Packs/day: 0 50     Years: 25 00     Pack years: 12 50     Types: Cigarettes    Smokeless tobacco: Never Used    Tobacco comment: cutting back with cigarette   Substance Use Topics    Alcohol use: Yes     Alcohol/week: 2 0 standard drinks     Types: 2 Cans of beer per week     Frequency: 4 or more times a week     Drinks per session: 5 or 6    Drug use: Not Currently     Frequency: 2 0 times per week     Types: Marijuana        Review of Systems   Constitutional: Negative for fever  HENT: Negative for trouble swallowing  Eyes: Negative for visual disturbance  Respiratory: Negative for shortness of breath  Cardiovascular: Negative for chest pain and palpitations  Gastrointestinal: Negative for abdominal pain  Endocrine: Positive for polydipsia and polyuria (On thiazide)  Genitourinary: Negative for decreased urine volume, difficulty urinating and dysuria  Musculoskeletal: Negative for gait problem     Skin: Negative for rash  Allergic/Immunologic: Positive for environmental allergies  Neurological: Negative for seizures and weakness  Hematological: Negative for adenopathy  Psychiatric/Behavioral: Negative for confusion  Physical Exam  ED Triage Vitals [03/30/21 1404]   Temp Pulse Respirations Blood Pressure SpO2   -- 83 19 131/77 100 %      Temp src Heart Rate Source Patient Position - Orthostatic VS BP Location FiO2 (%)   -- -- -- -- --      Pain Score       --             Orthostatic Vital Signs  Vitals:    03/30/21 1404   BP: 131/77   Pulse: 83       Physical Exam  Vitals signs and nursing note reviewed  Constitutional:       General: She is not in acute distress  Appearance: She is not toxic-appearing  HENT:      Head: Normocephalic  Right Ear: External ear normal       Left Ear: External ear normal       Nose: Nose normal  No rhinorrhea  Mouth/Throat:      Mouth: Mucous membranes are moist       Pharynx: Oropharynx is clear  No oropharyngeal exudate or posterior oropharyngeal erythema  Eyes:      General:         Right eye: No discharge  Left eye: No discharge  Pupils: Pupils are equal, round, and reactive to light  Neck:      Musculoskeletal: Neck supple  No muscular tenderness  Cardiovascular:      Rate and Rhythm: Normal rate and regular rhythm  Pulses: Normal pulses  Heart sounds: Normal heart sounds  No murmur  No friction rub  No gallop  Comments: 2+ Radial  Pulmonary:      Effort: Pulmonary effort is normal  No respiratory distress  Breath sounds: Normal breath sounds  No stridor  No wheezing, rhonchi or rales  Abdominal:      General: Abdomen is flat  Bowel sounds are normal  There is no distension  Palpations: Abdomen is soft  Tenderness: There is no abdominal tenderness  There is no right CVA tenderness, left CVA tenderness, guarding or rebound  Musculoskeletal:         General: No tenderness        Right lower leg: No edema  Left lower leg: No edema  Lymphadenopathy:      Cervical: No cervical adenopathy  Skin:     General: Skin is warm and dry  Capillary Refill: Capillary refill takes less than 2 seconds  Neurological:      Mental Status: She is alert  Comments: Patient is speaking clearly in complete sentences  Patient is answering appropriately and able follow commands  Patient is moving all four extremities spontaneously  No facial droop  Tongue midline  Patient is able to ambulate without difficulty      Psychiatric:         Mood and Affect: Mood normal          Behavior: Behavior normal          ED Medications  Medications   potassium chloride 20 mEq IVPB (premix) (20 mEq Intravenous New Bag 3/30/21 1438)   magnesium sulfate 2 g/50 mL IVPB (premix) 2 g (2 g Intravenous New Bag 3/30/21 1457)   aspirin chewable tablet 324 mg (has no administration in time range)   potassium chloride (K-DUR,KLOR-CON) CR tablet 40 mEq (40 mEq Oral Given 3/30/21 1436)       Diagnostic Studies  Results Reviewed     Procedure Component Value Units Date/Time    Manual Differential(PHLEBS Do Not Order) [[id number]]  (Abnormal) Collected: 03/30/21 1435    Lab Status: Final result Specimen: Blood from Arm, Left Updated: 03/30/21 1540     Segmented % 67 %      Lymphocytes % 24 %      Monocytes % 3 %      Eosinophils, % 0 %      Basophils % 0 %      Atypical Lymphocytes % 6 %      Absolute Neutrophils 5 65 Thousand/uL      Lymphocytes Absolute 2 03 Thousand/uL      Monocytes Absolute 0 25 Thousand/uL      Eosinophils Absolute 0 00 Thousand/uL      Basophils Absolute 0 00 Thousand/uL      Total Counted 100     Acanthocytes Present     Anisocytosis Present     Rajan Cells Present     Poikilocytes Present     Target Cells Present     Platelet Estimate Increased    CBC and differential [[id number]]  (Abnormal) Collected: 03/30/21 1435    Lab Status: Final result Specimen: Blood from Arm, Left Updated: 03/30/21 1540     WBC 8 44 Thousand/uL      RBC 3 42 Million/uL      Hemoglobin 10 8 g/dL      Hematocrit 31 7 %      MCV 93 fL      MCH 31 6 pg      MCHC 34 1 g/dL      RDW 13 3 %      MPV 9 7 fL      Platelets 994 Thousands/uL      nRBC 0 /100 WBCs     Narrative: This is an appended report  These results have been appended to a previously verified report      Troponin I [[id number]]  (Abnormal) Collected: 03/30/21 1451    Lab Status: Final result Specimen: Blood from Arm, Left Updated: 03/30/21 1530     Troponin I 0 08 ng/mL     Comprehensive metabolic panel [[id number]]  (Abnormal) Collected: 03/30/21 1435    Lab Status: Final result Specimen: Blood from Arm, Left Updated: 03/30/21 1526     Sodium 133 mmol/L      Potassium 2 2 mmol/L      Chloride 93 mmol/L      CO2 34 mmol/L      ANION GAP 6 mmol/L      BUN 7 mg/dL      Creatinine 0 84 mg/dL      Glucose 84 mg/dL      Calcium 10 1 mg/dL      AST 22 U/L      ALT 17 U/L      Alkaline Phosphatase 73 U/L      Total Protein 7 8 g/dL      Albumin 4 2 g/dL      Total Bilirubin 0 45 mg/dL      eGFR 87 ml/min/1 73sq m     Narrative:      [facility] guidelines for Chronic Kid[facility] Disease (CKD):     Stage 1 with normal or high GFR (GFR > 90 mL/min/1 73 square meters)    Stage 2 Mild CKD (GFR = 60-89 mL/min/1 73 square meters)    Stage 3A Moderate CKD (GFR = 45-59 mL/min/1 73 square meters)    Stage 3B Moderate CKD (GFR = 30-44 mL/min/1 73 square meters)    Stage 4 Severe CKD (GFR = 15-29 mL/min/1 73 square meters)    Stage 5 End Stage CKD (GFR <15 mL/min/1 73 square meters)  Note: GFR calculation is accurate only with a steady state creatinine    Magnesium [[id number]]  (Normal) Collected: 03/30/21 1435    Lab Status: Final result Specimen: Blood from Arm, Left Updated: 03/30/21 1521     Magnesium 2 0 mg/dL                  No orders to display         Procedures  Procedures      ED Course  ED Course as of Mar 30 1557   Tue Mar 30, 2021   1435 EKG: Normal rate, regular rhythm, no ectopy, normal axis, ST depressions and T inversions in the inferior and lateral leads - Will order troponin      1512 Baseline   Hemoglobin(!): 10 8   1513 Patient continues to feel well        1528 Potassium(!!): 2 2   1533 Will provide aspirin     Troponin I(!): 0 08   1546 Patient made aware of the lab findings  Patient requested that I tell her boss that patient is being admitted: This was performed over the phone at patient's request       [id number] Patient is admitted to [address] [place] [address] 20yo+      Most Recent Value   SBIRT (25 yo +)   In order to provide better care to our patients, we are screening all of our patients for alcohol and drug use  Would it be okay to ask you these screening questions? Unable to answer at this time Filed at: 03/30/2021 1452                MDM  Number of Diagnoses or Management Options  Anemia:   Elevated troponin:   Hypokalemia:   NSTEMI (non-ST elevated myocardial infarction) [facility]):   Diagnosis management comments: Patient a 51-year-old female, with a history significant for hypertension and prior alcohol misuse (quit drinking 3 weeks ago), who presents the ED today after being alerted by her PCP due to an abnormal potassium level: 2 1  Patient states that she is in her usual state of health and that she is without complaints  Patient denies fever, weakness, palpitations, chest pain, shortness of breath, abdominal pain, dysuria, decreased urine, seizure  Patient is currently hemodynamically stable  Her physical exam is unremarkable  This presentation is concerning for:  Hypokalemia, hypomagnesemia (especially given her her prior alcohol use), medication side effect  I also considered kidney disease  Will investigate with:  CBC, CMP, ECG, Magnesium  Will manage with magnesium, fluids, oral and IV potassium repletion and further based upon workup         Disposition  Final diagnoses:   Hypokalemia   Elevated troponin   NSTEMI (non-ST elevated myocardial infarction) ([facility] )   Anemia     Time reflects when diagnosis was documented in both MDM as applicable and the Disposition within this note     Time User Action Codes Description Comment    3/30/2021  2:41 PM Cheril Baltic Add [E87 6] Hypokalemia     3/30/2021  3:33 PM Liza Pitch Add [R77 8] Elevated troponin     3/30/2021  3:34 PM Legare, Trygve Heritage Add [I21 4] NSTEMI (non-ST elevated myocardial infarction) ([facility] )     3/30/2021  3:45 PM Liza Pitch A Add [D64 9] Anemia       ED Disposition     ED Disposition Condition Date/Time Comment    Admit Stable Tue Mar 30, 2021  3:56 PM Case was discussed with JENNYFER and the patient's admission status was agreed to be Admission Status: observation status to the service of Dr Raquel Finch  Follow-up Information    None         Patient's Medications   Discharge Prescriptions    No medications on file     No discharge procedures on file  PDMP Review     None           ED Provider  Attending physically available and evaluated [facility]  I managed the patient along with the ED Attending      Electronically Signed by         Sridevi Gil MD  03/30/21 0542

## 2021-03-31 ENCOUNTER — APPOINTMENT (OUTPATIENT)
Dept: NON INVASIVE DIAGNOSTICS | Facility: HOSPITAL | Age: 61
DRG: 641 | End: 2021-03-31
Payer: COMMERCIAL

## 2021-03-31 LAB
ANION GAP SERPL CALCULATED.3IONS-SCNC: 8 MMOL/L (ref 4–13)
ATRIAL RATE: 72 BPM
BASOPHILS # BLD AUTO: 0.07 THOUSANDS/ΜL (ref 0–0.1)
BASOPHILS NFR BLD AUTO: 1 % (ref 0–1)
BUN SERPL-MCNC: 4 MG/DL (ref 5–25)
CALCIUM SERPL-MCNC: 9.3 MG/DL (ref 8.3–10.1)
CHLORIDE SERPL-SCNC: 98 MMOL/L (ref 100–108)
CO2 SERPL-SCNC: 28 MMOL/L (ref 21–32)
CREAT SERPL-MCNC: 0.54 MG/DL (ref 0.6–1.3)
EOSINOPHIL # BLD AUTO: 0.1 THOUSAND/ΜL (ref 0–0.61)
EOSINOPHIL NFR BLD AUTO: 1 % (ref 0–6)
ERYTHROCYTE [DISTWIDTH] IN BLOOD BY AUTOMATED COUNT: 13.3 % (ref 11.6–15.1)
GFR SERPL CREATININE-BSD FRML MDRD: 119 ML/MIN/1.73SQ M
GLUCOSE SERPL-MCNC: 79 MG/DL (ref 65–140)
HCT VFR BLD AUTO: 30.3 % (ref 34.8–46.1)
HGB BLD-MCNC: 10.5 G/DL (ref 11.5–15.4)
IMM GRANULOCYTES # BLD AUTO: 0.03 THOUSAND/UL (ref 0–0.2)
IMM GRANULOCYTES NFR BLD AUTO: 0 % (ref 0–2)
LYMPHOCYTES # BLD AUTO: 2.42 THOUSANDS/ΜL (ref 0.6–4.47)
LYMPHOCYTES NFR BLD AUTO: 30 % (ref 14–44)
MCH RBC QN AUTO: 31.7 PG (ref 26.8–34.3)
MCHC RBC AUTO-ENTMCNC: 34.7 G/DL (ref 31.4–37.4)
MCV RBC AUTO: 92 FL (ref 82–98)
MONOCYTES # BLD AUTO: 0.68 THOUSAND/ΜL (ref 0.17–1.22)
MONOCYTES NFR BLD AUTO: 9 % (ref 4–12)
NEUTROPHILS # BLD AUTO: 4.7 THOUSANDS/ΜL (ref 1.85–7.62)
NEUTS SEG NFR BLD AUTO: 59 % (ref 43–75)
NRBC BLD AUTO-RTO: 0 /100 WBCS
P AXIS: 233 DEGREES
PLATELET # BLD AUTO: 491 THOUSANDS/UL (ref 149–390)
PMV BLD AUTO: 9.7 FL (ref 8.9–12.7)
POTASSIUM SERPL-SCNC: 2.7 MMOL/L (ref 3.5–5.3)
PR INTERVAL: 122 MS
QRS AXIS: 69 DEGREES
QRSD INTERVAL: 90 MS
QT INTERVAL: 370 MS
QTC INTERVAL: 405 MS
RBC # BLD AUTO: 3.31 MILLION/UL (ref 3.81–5.12)
SODIUM SERPL-SCNC: 134 MMOL/L (ref 136–145)
T WAVE AXIS: 268 DEGREES
VENTRICULAR RATE: 72 BPM
WBC # BLD AUTO: 8 THOUSAND/UL (ref 4.31–10.16)

## 2021-03-31 PROCEDURE — 99232 SBSQ HOSP IP/OBS MODERATE 35: CPT | Performed by: NURSE PRACTITIONER

## 2021-03-31 PROCEDURE — 80048 BASIC METABOLIC PNL TOTAL CA: CPT | Performed by: FAMILY MEDICINE

## 2021-03-31 PROCEDURE — 93306 TTE W/DOPPLER COMPLETE: CPT | Performed by: INTERNAL MEDICINE

## 2021-03-31 PROCEDURE — 85027 COMPLETE CBC AUTOMATED: CPT | Performed by: FAMILY MEDICINE

## 2021-03-31 PROCEDURE — 93010 ELECTROCARDIOGRAM REPORT: CPT | Performed by: INTERNAL MEDICINE

## 2021-03-31 PROCEDURE — 93306 TTE W/DOPPLER COMPLETE: CPT

## 2021-03-31 RX ORDER — LANOLIN ALCOHOL/MO/W.PET/CERES
3 CREAM (GRAM) TOPICAL
Status: DISCONTINUED | OUTPATIENT
Start: 2021-03-31 | End: 2021-04-01 | Stop reason: HOSPADM

## 2021-03-31 RX ORDER — ALPRAZOLAM 0.25 MG/1
0.25 TABLET ORAL ONCE
Status: COMPLETED | OUTPATIENT
Start: 2021-03-31 | End: 2021-03-31

## 2021-03-31 RX ORDER — POTASSIUM CHLORIDE 20 MEQ/1
40 TABLET, EXTENDED RELEASE ORAL ONCE
Status: COMPLETED | OUTPATIENT
Start: 2021-03-31 | End: 2021-03-31

## 2021-03-31 RX ORDER — POTASSIUM CHLORIDE 14.9 MG/ML
20 INJECTION INTRAVENOUS
Status: COMPLETED | OUTPATIENT
Start: 2021-03-31 | End: 2021-04-01

## 2021-03-31 RX ADMIN — ACETAMINOPHEN 650 MG: 325 TABLET, FILM COATED ORAL at 12:56

## 2021-03-31 RX ADMIN — ENOXAPARIN SODIUM 40 MG: 40 INJECTION SUBCUTANEOUS at 10:20

## 2021-03-31 RX ADMIN — ACETAMINOPHEN 650 MG: 325 TABLET, FILM COATED ORAL at 19:34

## 2021-03-31 RX ADMIN — POTASSIUM CHLORIDE 20 MEQ: 14.9 INJECTION, SOLUTION INTRAVENOUS at 08:00

## 2021-03-31 RX ADMIN — ALPRAZOLAM 0.25 MG: 0.25 TABLET ORAL at 22:06

## 2021-03-31 RX ADMIN — POTASSIUM CHLORIDE 40 MEQ: 1500 TABLET, EXTENDED RELEASE ORAL at 08:02

## 2021-03-31 RX ADMIN — AMLODIPINE BESYLATE 10 MG: 10 TABLET ORAL at 10:20

## 2021-03-31 RX ADMIN — ATORVASTATIN CALCIUM 20 MG: 20 TABLET, FILM COATED ORAL at 16:58

## 2021-03-31 RX ADMIN — POTASSIUM CHLORIDE 20 MEQ: 14.9 INJECTION, SOLUTION INTRAVENOUS at 10:21

## 2021-03-31 RX ADMIN — NICOTINE 1 PATCH: 21 PATCH, EXTENDED RELEASE TRANSDERMAL at 10:20

## 2021-03-31 RX ADMIN — FLUOXETINE 10 MG: 10 CAPSULE ORAL at 10:20

## 2021-03-31 RX ADMIN — MELATONIN TAB 3 MG 3 MG: 3 TAB at 22:06

## 2021-03-31 NOTE — RESTORATIVE TECHNICIAN NOTE
Restorative Specialist Mobility Note       Activity: Ambulate in condon, Ambulate in room, Bathroom privileges, Chair, Dangle, Stand at bedside(Educated/encouraged pt to ambulate with assistance 3-4 x's/day  Bed alarm on   Pt callbell, phone/tray within reach )     Assistive Device: None    Sudhir BRUSH, Restorative Technician, [employer]

## 2021-03-31 NOTE — CASE MANAGEMENT
Pt is not a 30 day readmission  Pt is not a bundle  Unplanned readmission risk color- Green  Pt was under observation this admission  CM met pt at bedside, introduce self and made aware of Cm role at dc  Pt denies having a LW and POA  Primary contact is her  [id number]  Pt lives with her  in a 1st flr apt with 1 FUENTES  Pt was IPTA with all ADL's, does not drive and employed  Pt has no DME  Pt's  transport pt to her appts  PCP is dr Akhil Ingram  Pharmacy is [facility] on [address]  Pt denies hx with HHC, STR, drug and IP psych tx  Pt reported alc use, drinking 10 cans of 12 oz beer/day but no hx of alc rehab  CM offered HOST to pt but pt refused  Pt's  will transport pt when dc  CM reviewed d/c planning process including the following: identifying help at home, patient preference for d/c planning needs, Discharge Lounge, [facility] Meds to Bed program, availability of treatment team to discuss questions or concerns patient and/or family may have regarding understanding medications and recognizing signs and symptoms once discharged  CM also encouraged patient to follow up with all recommended appointments after discharge  Patient advised of importance for patient and family to participate in managing patients medical well being

## 2021-03-31 NOTE — PROGRESS NOTES
[address] [facility]  Progress Note - [facility] 1960, 61 y o  female MRN: [id number]  Unit/Bed#: [facility] 724-01 Encounter: [id number]  Primary Care Provider: THONG Fiore   Date and time admitted to hospital: 3/30/2021  2:17 PM    * Hypokalemia  Assessment & Plan  · Hypokalemia noted on outpatient labs, referred to ED by PCP   2 1 on admission  Magnesium 2 0   · Was given 40 mEq intravenous as well as 40 p o  · Potassium 2 7 today  Giving an additional 40 intravenous and 40 p o  Monia Le · Continue telemetry until potassium normalized, no events  · BMP in a m  Elevated troponin  Assessment & Plan  · Troponin elevated 0 08/0 07  Flat  No complaints of chest pain, shortness of breath  Additionally, patient has an extremely strenuous job where she is consistently lifting, pushing, pulling walking up multiple flights of stairs without any difficulty, no chest pain/shortness of breath  · EKG showed ST depressions which are not significantly different from previous EKG on 1/21/2021  · No events on telemetry  · Will check echocardiogram for completeness    Anemia  Assessment & Plan  · Chronic, baseline between 10 and 12  Stable  No active signs or symptoms of bleeding  · Monitor      Essential hypertension  Assessment & Plan  · Continue Norvasc  HCTZ on hold secondary to hypokalemia  Would not resume on discharge  Hyperlipidemia  Assessment & Plan  · Continue atorvastatin      Hyponatremia  Assessment & Plan  · 133, 134 today  · Holding HCTZ  · BMP in a m  Tobacco abuse  Assessment & Plan  · Nicotine patch    Alcohol use  Assessment & Plan  · History of alcohol abuse, recently quit approximately 3 weeks ago  · No need for CIWA protocol      VTE Pharmacologic Prophylaxis:   Moderate Risk (Score 3-4) - Pharmacological DVT Prophylaxis Ordered: enoxaparin (Lovenox)  Patient Centered Rounds: I performed bedside rounds with nursing staff today    Discussions with Specialists or Other Care Team Provider: nursing, case management     Education and Discussions with Family / Patient: Patient declined call to   Time Spent for Care: 30 minutes  More than 50% of total time spent on counseling and coordination of care as described above  Current Length of Stay: 0 day(s)  Current Patient Status: Observation   Certification Statement: The patient, admitted on an observation basis, will now require > 2 midnight hospital stay due to persistent hypokalemia  Discharge Plan: hopefully tomorrow if echo negative and hypokalemia improved    Code Status: Level 1 - Full Code    Subjective:   Patient offers no acute complaints, she felt a bit weak yesterday but feels better today  She denies any chest pain, shortness of breath, palpitations  No recent illness  No recent falls  Reports that she has a very strenuous job where she is consistently lifting, pushing, pulling and walking up multiple flights of stairs and she has no difficulty with this such as chest pain, shortness of breath  Objective:     Vitals:   Temp (24hrs), Av 1 °F (36 7 °C), Min:97 6 °F (36 4 °C), Max:98 4 °F (36 9 °C)    Temp:  [97 6 °F (36 4 °C)-98 4 °F (36 9 °C)] 97 6 °F (36 4 °C)  HR:  [81-90] 90  Resp:  [16-22] 18  BP: (113-143)/(67-94) 143/94  SpO2:  [98 %-100 %] 99 %  Body mass index is 19 72 kg/m²  Input and Output Summary (last 24 hours):   No intake or output data in the 24 hours ending 21 1124    Physical Exam:   Physical Exam  Vitals signs and nursing note reviewed  Constitutional:       General: She is not in acute distress  Cardiovascular:      Rate and Rhythm: Normal rate  Heart sounds: No murmur  Pulmonary:      Breath sounds: Normal breath sounds  Abdominal:      Palpations: Abdomen is soft  Tenderness: There is no abdominal tenderness  Musculoskeletal:         General: No swelling  Skin:     General: Skin is warm     Neurological:      Mental Status: She is alert and oriented to person, place, and time  Mental status is at baseline  Psychiatric:         Mood and Affect: Mood normal           Additional Data:     Labs:  Results from last 7 days   Lab Units 03/31/21  0549   WBC Thousand/uL 8 00   HEMOGLOBIN g/dL 10 5*   HEMATOCRIT % 30 3*   PLATELETS Thousands/uL 491*   NEUTROS PCT % 59   LYMPHS PCT % 30   MONOS PCT % 9   EOS PCT % 1     Results from last 7 days   Lab Units 03/31/21  0549 03/30/21  1435   SODIUM mmol/L 134* 133*   POTASSIUM mmol/L 2 7* 2 2*   CHLORIDE mmol/L 98* 93*   CO2 mmol/L 28 34*   BUN mg/dL 4* 7   CREATININE mg/dL 0 54* 0 84   ANION GAP mmol/L 8 6   CALCIUM mg/dL 9 3 10 1   ALBUMIN g/dL  --  4 2   TOTAL BILIRUBIN mg/dL  --  0 45   ALK PHOS U/L  --  73   ALT U/L  --  17   AST U/L  --  22   GLUCOSE RANDOM mg/dL 79 84             Results from last 7 days   Lab Units 03/30/21  1051   HEMOGLOBIN A1C % 5 0           Lines/Drains:  Invasive Devices     Peripheral Intravenous Line            Peripheral IV 03/30/21 Left Antecubital less than 1 day                  Telemetry:  Telemetry Orders (From admission, onward)             48 Hour Telemetry Monitoring  Continuous x 48 hours     Question:  Reason for 48 Hour Telemetry  Answer:  Acute MI, chest pain - R/O MI, or unstable angina                 Telemetry Reviewed: Normal Sinus Rhythm  Indication for Continued Telemetry Use: Metabolic/electrolyte disturbance with high probability of dysrhythmia           Imaging: No pertinent imaging reviewed      Recent Cultures (last 7 days):         Last 24 Hours Medication List:   Current Facility-Administered Medications   Medication Dose Route Frequency Provider Last Rate    acetaminophen  650 mg Oral Q6H PRN Gaye Carrington PA-C      aluminum-magnesium hydroxide-simethicone  30 mL Oral Q4H PRN Gaye Carrington PA-C      amLODIPine  10 mg Oral Daily Lillie Rasheed MD      atorvastatin  20 mg Oral Daily With Lisset Walker MD     [employer] enoxaparin  40 mg Subcutaneous Daily Toñito Castro MD      FLUoxetine  10 mg Oral Daily Toñito Castro MD      nicotine  1 patch Transdermal Daily Toñito Castro MD      potassium chloride  20 mEq Intravenous Q2H Tremayne Caba PA-C 20 mEq (03/31/21 1021)        Today, Patient Was Seen By: THONG Bassett    **Please Note: This note may have been constructed using a voice recognition system  **

## 2021-03-31 NOTE — PLAN OF CARE
Problem: Potential for Falls  Goal: Patient will remain free of falls  Description: INTERVENTIONS:  - Assess patient frequently for physical needs  -  Identify cognitive and physical deficits and behaviors that affect risk of falls  -  [place] fall precautions as indicated by assessment   - Educate patient/family on patient safety including physical limitations  - Instruct patient to call for assistance with activity based on assessment  - Modify environment to reduce risk of injury  - Consider OT/PT consult to assist with strengthening/mobility  Outcome: Progressing     Problem: Nutrition/Hydration-ADULT  Goal: Nutrient/Hydration intake appropriate for improving, restoring or maintaining nutritional needs  Description: Monitor and assess patient's nutrition/hydration status for malnutrition  Collaborate with interdisciplinary team and initiate plan and interventions as ordered  Monitor patient's weight and dietary intake as ordered or per policy  Utilize nutrition screening tool and intervene as necessary  Determine patient's food preferences and provide high-protein, high-caloric foods as appropriate       INTERVENTIONS:  - Monitor oral intake, urinary output, labs, and treatment plans  - Assess nutrition and hydration status and recommend course of action  - Evaluate amount of meals eaten  - Assist patient with eating if necessary   - Allow adequate time for meals  - Recommend/ encourage appropriate diets, oral nutritional supplements, and vitamin/mineral supplements  - Order, calculate, and assess calorie counts as needed  - Recommend, monitor, and adjust tube feedings and TPN/PPN based on assessed needs  - Assess need for intravenous fluids  - Provide specific nutrition/hydration education as appropriate  - Include patient/family/caregiver in decisions related to nutrition  Outcome: Progressing     Problem: METABOLIC, FLUID AND ELECTROLYTES - ADULT  Goal: Electrolytes maintained within normal limits  Description: INTERVENTIONS:  - Monitor labs and assess patient for signs and symptoms of electrolyte imbalances  - Administer electrolyte replacement as ordered  - Monitor response to electrolyte replacements, including repeat lab results as appropriate  - Instruct patient on fluid and nutrition as appropriate  Outcome: Progressing  Goal: Fluid balance maintained  Description: INTERVENTIONS:  - Monitor labs   - Monitor I/O and WT  - Instruct patient on fluid and nutrition as appropriate  - Assess for signs & symptoms of volume excess or deficit  Outcome: Progressing     Problem: PAIN - ADULT  Goal: Verbalizes/displays adequate comfort level or baseline comfort level  Description: Interventions:  - Encourage patient to monitor pain and request assistance  - Assess pain using appropriate pain scale  - Administer analgesics based on type and severity of pain and evaluate response  - Implement non-pharmacological measures as appropriate and evaluate response  - Consider cultural and social influences on pain and pain management  - Notify physician/advanced practitioner if interventions unsuccessful or patient reports new pain  Outcome: Progressing     Problem: SAFETY ADULT  Goal: Patient will remain free of falls  Description: INTERVENTIONS:  - Assess patient frequently for physical needs  -  Identify cognitive and physical deficits and behaviors that affect risk of falls    -  [place] fall precautions as indicated by assessment   - Educate patient/family on patient safety including physical limitations  - Instruct patient to call for assistance with activity based on assessment  - Modify environment to reduce risk of injury  - Consider OT/PT consult to assist with strengthening/mobility  Outcome: Progressing  Goal: Maintain or return to baseline ADL function  Description: INTERVENTIONS:  - Assess patient frequently for physical needs  -  Identify cognitive and physical deficits and behaviors that affect risk of falls    -  [place] fall precautions as indicated by assessment   - Educate patient/family on patient safety including physical limitations  - Instruct patient to call for assistance with activity based on assessment  - Modify environment to reduce risk of injury  - Consider OT/PT consult to assist with strengthening/mobility  Outcome: Progressing  Goal: Maintain or return mobility status to optimal level  Description: INTERVENTIONS:  -  Assess patient's ability to carry out ADLs; assess patient's baseline for ADL function and identify physical deficits which impact ability to perform ADLs (bathing, care of mouth/teeth, toileting, grooming, dressing, etc )  - Assess/evaluate cause of self-care deficits   - Assess range of motion  - Assess patient's mobility; develop plan if impaired  - Assess patient's need for assistive devices and provide as appropriate  - Encourage maximum independence but intervene and supervise when necessary  - Involve family in performance of ADLs  - Assess for home care needs following discharge   - Consider OT consult to assist with ADL evaluation and planning for discharge  - Provide patient education as appropriate  Outcome: Progressing     Problem: DISCHARGE PLANNING  Goal: Discharge to home or other facility with appropriate resources  Description: INTERVENTIONS:  - Identify barriers to discharge w/patient and caregiver  - Arrange for needed discharge resources and transportation as appropriate  - Identify discharge learning needs (meds, wound care, etc )  - Arrange for interpretive services to assist at discharge as needed  - Refer to Case Management Department for coordinating discharge planning if the patient needs post-hospital services based on physician/advanced practitioner order or complex needs related to functional status, cognitive ability, or social support system  Outcome: Progressing     Problem: Knowledge Deficit  Goal: Patient/family/caregiver demonstrates understanding of disease process, treatment plan, medications, and discharge instructions  Description: Complete learning assessment and assess knowledge base    Interventions:  - Provide teaching at level of understanding  - Provide teaching via preferred learning methods  Outcome: Progressing

## 2021-03-31 NOTE — PLAN OF CARE
Problem: Potential for Falls  Goal: Patient will remain free of falls  Description: INTERVENTIONS:  - Assess patient frequently for physical needs  -  Identify cognitive and physical deficits and behaviors that affect risk of falls  -  [place] fall precautions as indicated by assessment   - Educate patient/family on patient safety including physical limitations  - Instruct patient to call for assistance with activity based on assessment  - Modify environment to reduce risk of injury  - Consider OT/PT consult to assist with strengthening/mobility  Outcome: Progressing     Problem: Nutrition/Hydration-ADULT  Goal: Nutrient/Hydration intake appropriate for improving, restoring or maintaining nutritional needs  Description: Monitor and assess patient's nutrition/hydration status for malnutrition  Collaborate with interdisciplinary team and initiate plan and interventions as ordered  Monitor patient's weight and dietary intake as ordered or per policy  Utilize nutrition screening tool and intervene as necessary  Determine patient's food preferences and provide high-protein, high-caloric foods as appropriate       INTERVENTIONS:  - Monitor oral intake, urinary output, labs, and treatment plans  - Assess nutrition and hydration status and recommend course of action  - Evaluate amount of meals eaten  - Assist patient with eating if necessary   - Allow adequate time for meals  - Recommend/ encourage appropriate diets, oral nutritional supplements, and vitamin/mineral supplements  - Order, calculate, and assess calorie counts as needed  - Recommend, monitor, and adjust tube feedings and TPN/PPN based on assessed needs  - Assess need for intravenous fluids  - Provide specific nutrition/hydration education as appropriate  - Include patient/family/caregiver in decisions related to nutrition  Outcome: Progressing     Problem: METABOLIC, FLUID AND ELECTROLYTES - ADULT  Goal: Electrolytes maintained within normal limits  Description: INTERVENTIONS:  - Monitor labs and assess patient for signs and symptoms of electrolyte imbalances  - Administer electrolyte replacement as ordered  - Monitor response to electrolyte replacements, including repeat lab results as appropriate  - Instruct patient on fluid and nutrition as appropriate  Outcome: Progressing  Goal: Fluid balance maintained  Description: INTERVENTIONS:  - Monitor labs   - Monitor I/O and WT  - Instruct patient on fluid and nutrition as appropriate  - Assess for signs & symptoms of volume excess or deficit  Outcome: Progressing     Problem: PAIN - ADULT  Goal: Verbalizes/displays adequate comfort level or baseline comfort level  Description: Interventions:  - Encourage patient to monitor pain and request assistance  - Assess pain using appropriate pain scale  - Administer analgesics based on type and severity of pain and evaluate response  - Implement non-pharmacological measures as appropriate and evaluate response  - Consider cultural and social influences on pain and pain management  - Notify physician/advanced practitioner if interventions unsuccessful or patient reports new pain  Outcome: Progressing     Problem: SAFETY ADULT  Goal: Patient will remain free of falls  Description: INTERVENTIONS:  - Assess patient frequently for physical needs  -  Identify cognitive and physical deficits and behaviors that affect risk of falls    -  [place] fall precautions as indicated by assessment   - Educate patient/family on patient safety including physical limitations  - Instruct patient to call for assistance with activity based on assessment  - Modify environment to reduce risk of injury  - Consider OT/PT consult to assist with strengthening/mobility  Outcome: Progressing  Goal: Maintain or return to baseline ADL function  Description: INTERVENTIONS:  -  Assess patient's ability to carry out ADLs; assess patient's baseline for ADL function and identify physical deficits which impact ability to perform ADLs (bathing, care of mouth/teeth, toileting, grooming, dressing, etc )  - Assess/evaluate cause of self-care deficits   - Assess range of motion  - Assess patient's mobility; develop plan if impaired  - Assess patient's need for assistive devices and provide as appropriate  - Encourage maximum independence but intervene and supervise when necessary  - Involve family in performance of ADLs  - Assess for home care needs following discharge   - Consider OT consult to assist with ADL evaluation and planning for discharge  - Provide patient education as appropriate  Outcome: Progressing  Goal: Maintain or return mobility status to optimal level  Description: INTERVENTIONS:  - Assess patient's baseline mobility status (ambulation, transfers, stairs, etc )    - Identify cognitive and physical deficits and behaviors that affect mobility  - Identify mobility aids required to assist with transfers and/or ambulation (gait belt, sit-to-stand, lift, walker, cane, etc )  - [place] fall precautions as indicated by assessment  - Record patient progress and toleration of activity level on Mobility SBAR; progress patient to next Phase/Stage  - Instruct patient to call for assistance with activity based on assessment  - Consider rehabilitation consult to assist with strengthening/weightbearing, etc   Outcome: Progressing     Problem: DISCHARGE PLANNING  Goal: Discharge to home or other facility with appropriate resources  Description: INTERVENTIONS:  - Identify barriers to discharge w/patient and caregiver  - Arrange for needed discharge resources and transportation as appropriate  - Identify discharge learning needs (meds, wound care, etc )  - Arrange for interpretive services to assist at discharge as needed  - Refer to Case Management Department for coordinating discharge planning if the patient needs post-hospital services based on physician/advanced practitioner order or complex needs related to functional status, cognitive ability, or social support system  Outcome: Progressing     Problem: Knowledge Deficit  Goal: Patient/family/caregiver demonstrates understanding of disease process, treatment plan, medications, and discharge instructions  Description: Complete learning assessment and assess knowledge base    Interventions:  - Provide teaching at level of understanding  - Provide teaching via preferred learning methods  Outcome: Progressing

## 2021-03-31 NOTE — UTILIZATION REVIEW
Initial Clinical Review    Admission: Date/Time/Statement:     OBSERVATION 3-30 -21 1556 CHANGED TO INPATIENT 3-31-21 1124 FOR CONTINUED TREATMENT OF HYPOKALEMIA    Inpatient Admission Once     Question Answer   Level of Care Med Surg   Estimated length of stay More than 2 Midnights   Certification I certify that inpatient services are medically necessary for this patient for a duration of greater than two midnights  See H&P and MD Progress Notes for additional information about the patient's course of treatment  03/31/21 1124         ED Arrival Information     Expected Arrival Acuity Means of Arrival Escorted By Service Admission Type    3/30/2021  3/30/2021 13:50 Urgent Walk-In Self Hospitalist Urgent    Arrival Complaint    hypokalemia        Chief Complaint   Patient presents with    Abnormal Lab     pt reports got a call from PCP, potassium was low      Assessment/Plan:    61year old female presents to ed from home as advised by PCP  for evaluation and treatment  Potassium 2 1   Clinical assessment significant for potassium 2 1, troponin 0 8,  EKG without ischemia  Hypokalemia likely related to diuretic  Treated in ed with iv kcl 20 meq x2 and po kdur 40 meq x2, iv mag x1  Admit to observation med surg for hypokalemia  Plan includes: trend potassium, trend troponin, telemetry, replete electrolytes  3-31   Potassium today is 2 7  Continue iv kcl 40 meq and kdur 40 meq    Continue telemetry and repeat bmp in am       ED Triage Vitals   Temperature Pulse Respirations Blood Pressure SpO2   03/30/21 1404 03/30/21 1404 03/30/21 1404 03/30/21 1404 03/30/21 1404   98 4 °F (36 9 °C) 83 19 131/77 100 %      Oral Monitor         No Pain          03/30/21 50 5 kg (111 lb 5 3 oz)     Additional Vital Signs:       Date/Time  Temp  Pulse  Resp  BP  MAP (mmHg)  SpO2  O2 Device   03/31/21 07:47:13  97 6 °F (36 4 °C)  90  --  143/94  110  99 %  --   03/31/21 03:31:42  98 2 °F (36 8 °C)  81  18  128/77  94 98 %  --   03/30/21 22:00:53  --  86  16  131/76  94  100 %  --   03/30/21 18:58:39  98 2 °F (36 8 °C)  83  16  113/74  87  99 %  --   03/30/21 1630  --  --  --  --  --  --  None (Room air)   03/30/21 1612  --  86  22  142/67  --  100 %  None (Room air)             Pertinent Labs/Diagnostic Test Results:       Results from last 7 days   Lab Units 03/31/21  0549 03/30/21  1435 03/30/21  1051   WBC Thousand/uL 8 00 8 44 7 36   HEMOGLOBIN g/dL 10 5* 10 8* 10 0*   HEMATOCRIT % 30 3* 31 7* 29 6*   PLATELETS Thousands/uL 491* 507* 489*   NEUTROS ABS Thousands/µL 4 70  --   --          Results from last 7 days   Lab Units 03/31/21  0549 03/30/21  1435 03/30/21  1051   SODIUM mmol/L 134* 133* 133*   POTASSIUM mmol/L 2 7* 2 2* 2 1*   CHLORIDE mmol/L 98* 93* 92*   CO2 mmol/L 28 34* 34*   ANION GAP mmol/L 8 6 7   BUN mg/dL 4* 7 6   CREATININE mg/dL 0 54* 0 84 0 63   EGFR ml/min/1 73sq m 119 87 113   CALCIUM mg/dL 9 3 10 1 9 2   MAGNESIUM mg/dL  --  2 0  --      Results from last 7 days   Lab Units 03/30/21  1435 03/30/21  1051   AST U/L 22 20   ALT U/L 17 15   ALK PHOS U/L 73 62   TOTAL PROTEIN g/dL 7 8 6 9   ALBUMIN g/dL 4 2 3 5   TOTAL BILIRUBIN mg/dL 0 45 0 56         Results from last 7 days   Lab Units 03/31/21  0549 03/30/21  1435   GLUCOSE RANDOM mg/dL 79 84         Results from last 7 days   Lab Units 03/30/21  1051   HEMOGLOBIN A1C % 5 0   EAG mg/dl 97       Results from last 7 days   Lab Units 03/30/21  1733 03/30/21  1451   TROPONIN I ng/mL 0 07* 0 08*       Results from last 7 days   Lab Units 03/30/21  1051   TSH 3RD GENERATON uIU/mL 1 290       Results from last 7 days   Lab Units 03/30/21  1435   FERRITIN ng/mL 41       ED Treatment:   Medication Administration from 03/30/2021 1329 to 03/30/2021 1854       Date/Time Order Dose Route Action     03/30/2021 1436 potassium chloride (K-DUR,KLOR-CON) CR tablet 40 mEq 40 mEq Oral Given     03/30/2021 1645 potassium chloride 20 mEq IVPB (premix) 20 mEq Intravenous New Bag     03/30/2021 1438 potassium chloride 20 mEq IVPB (premix) 20 mEq Intravenous New Bag     03/30/2021 1457 magnesium sulfate 2 g/50 mL IVPB (premix) 2 g 2 g Intravenous New Bag     03/30/2021 1614 aspirin chewable tablet 324 mg 324 mg Oral Given     03/30/2021 1732 atorvastatin (LIPITOR) tablet 20 mg 20 mg Oral Given     03/30/2021 1728 potassium chloride (K-DUR,KLOR-CON) CR tablet 40 mEq 40 mEq Oral Given        Past Medical History:   Diagnosis Date    Anemia 3/30/2021    Hypertension     Shingles     Smoking 1/2 pack a day or less      Present on Admission:   Essential hypertension   Tobacco abuse   Hyperlipidemia      Admitting Diagnosis:     Hypokalemia [E87 6]  Anemia [D64 9]  Elevated troponin [R77 8]  [facility] [I21 4]      Age/Sex: 61 y o  female     Admission Orders:    amLODIPine, 10 mg, Oral, Daily  atorvastatin, 20 mg, Oral, Daily With Dinner  enoxaparin, 40 mg, Subcutaneous, Daily  FLUoxetine, 10 mg, Oral, Daily  nicotine, 1 patch, Transdermal, Daily  potassium chloride, 20 mEq, Intravenous, Q2H      Continuous IV Infusions:     PRN Meds:  acetaminophen, 650 mg, Oral, Q6H PRN  aluminum-magnesium hydroxide-simethicone, 30 mL, Oral, Q4H PRN        None    Network Utilization Review Department  ATTENTION: Please call with any questions or concerns to [phone] and carefully listen to the prompts so that you are directed to the right person  All voicemails are confidential   [facility] all requests for admission clinical reviews, approved or denied determinations and any other requests to dedicated fax number below belonging to the campus where the patient is receiving treatment   List of dedicated fax numbers for the Facilities:  [address] DENIALS (Administrative/Medical Necessity) [phone]   [address] (Maternity/NICU/Pediatrics) 261 [facility],7[address] Floor [phone]   Yue Velasquez [address]  [phone]   Emiliano Lima [address] ([address]) [address] [place] [address] Ashvin Castro 1481 [phone]   [address] 951 [phone]

## 2021-04-01 VITALS
WEIGHT: 111.33 LBS | HEIGHT: 63 IN | DIASTOLIC BLOOD PRESSURE: 81 MMHG | SYSTOLIC BLOOD PRESSURE: 119 MMHG | OXYGEN SATURATION: 100 % | RESPIRATION RATE: 16 BRPM | TEMPERATURE: 97.9 F | HEART RATE: 100 BPM | BODY MASS INDEX: 19.73 KG/M2

## 2021-04-01 LAB
ANION GAP SERPL CALCULATED.3IONS-SCNC: 8 MMOL/L (ref 4–13)
BUN SERPL-MCNC: 6 MG/DL (ref 5–25)
CALCIUM SERPL-MCNC: 9 MG/DL (ref 8.3–10.1)
CHLORIDE SERPL-SCNC: 97 MMOL/L (ref 100–108)
CO2 SERPL-SCNC: 23 MMOL/L (ref 21–32)
CREAT SERPL-MCNC: 0.73 MG/DL (ref 0.6–1.3)
GFR SERPL CREATININE-BSD FRML MDRD: 104 ML/MIN/1.73SQ M
GLUCOSE SERPL-MCNC: 89 MG/DL (ref 65–140)
POTASSIUM SERPL-SCNC: 3.4 MMOL/L (ref 3.5–5.3)
SODIUM SERPL-SCNC: 128 MMOL/L (ref 136–145)
STFR SERPL-SCNC: 11.5 NMOL/L (ref 12.2–27.3)

## 2021-04-01 PROCEDURE — 80048 BASIC METABOLIC PNL TOTAL CA: CPT | Performed by: NURSE PRACTITIONER

## 2021-04-01 PROCEDURE — 99239 HOSP IP/OBS DSCHRG MGMT >30: CPT | Performed by: INTERNAL MEDICINE

## 2021-04-01 RX ORDER — POTASSIUM CHLORIDE 20 MEQ/1
40 TABLET, EXTENDED RELEASE ORAL ONCE
Status: COMPLETED | OUTPATIENT
Start: 2021-04-01 | End: 2021-04-01

## 2021-04-01 RX ORDER — POTASSIUM CHLORIDE 20 MEQ/1
20 TABLET, EXTENDED RELEASE ORAL DAILY
Qty: 30 TABLET | Refills: 0 | Status: SHIPPED | OUTPATIENT
Start: 2021-04-01 | End: 2021-09-30 | Stop reason: HOSPADM

## 2021-04-01 RX ADMIN — AMLODIPINE BESYLATE 10 MG: 10 TABLET ORAL at 08:09

## 2021-04-01 RX ADMIN — POTASSIUM CHLORIDE 40 MEQ: 1500 TABLET, EXTENDED RELEASE ORAL at 07:56

## 2021-04-01 RX ADMIN — ALUMINUM HYDROXIDE, MAGNESIUM HYDROXIDE, AND SIMETHICONE 30 ML: 200; 200; 20 SUSPENSION ORAL at 11:21

## 2021-04-01 RX ADMIN — ENOXAPARIN SODIUM 40 MG: 40 INJECTION SUBCUTANEOUS at 08:10

## 2021-04-01 RX ADMIN — FLUOXETINE 10 MG: 10 CAPSULE ORAL at 08:09

## 2021-04-01 RX ADMIN — NICOTINE 1 PATCH: 21 PATCH, EXTENDED RELEASE TRANSDERMAL at 08:10

## 2021-04-01 RX ADMIN — SODIUM CHLORIDE 250 ML: 0.9 INJECTION, SOLUTION INTRAVENOUS at 08:07

## 2021-04-01 NOTE — PLAN OF CARE
Problem: Potential for Falls  Goal: Patient will remain free of falls  Description: INTERVENTIONS:  - Assess patient frequently for physical needs  -  Identify cognitive and physical deficits and behaviors that affect risk of falls  -  [place] fall precautions as indicated by assessment   - Educate patient/family on patient safety including physical limitations  - Instruct patient to call for assistance with activity based on assessment  - Modify environment to reduce risk of injury  - Consider OT/PT consult to assist with strengthening/mobility  Outcome: Progressing     Problem: Nutrition/Hydration-ADULT  Goal: Nutrient/Hydration intake appropriate for improving, restoring or maintaining nutritional needs  Description: Monitor and assess patient's nutrition/hydration status for malnutrition  Collaborate with interdisciplinary team and initiate plan and interventions as ordered  Monitor patient's weight and dietary intake as ordered or per policy  Utilize nutrition screening tool and intervene as necessary  Determine patient's food preferences and provide high-protein, high-caloric foods as appropriate       INTERVENTIONS:  - Monitor oral intake, urinary output, labs, and treatment plans  - Assess nutrition and hydration status and recommend course of action  - Evaluate amount of meals eaten  - Assist patient with eating if necessary   - Allow adequate time for meals  - Recommend/ encourage appropriate diets, oral nutritional supplements, and vitamin/mineral supplements  - Order, calculate, and assess calorie counts as needed  - Recommend, monitor, and adjust tube feedings and TPN/PPN based on assessed needs  - Assess need for intravenous fluids  - Provide specific nutrition/hydration education as appropriate  - Include patient/family/caregiver in decisions related to nutrition  Outcome: Progressing     Problem: METABOLIC, FLUID AND ELECTROLYTES - ADULT  Goal: Electrolytes maintained within normal limits  Description: INTERVENTIONS:  - Monitor labs and assess patient for signs and symptoms of electrolyte imbalances  - Administer electrolyte replacement as ordered  - Monitor response to electrolyte replacements, including repeat lab results as appropriate  - Instruct patient on fluid and nutrition as appropriate  Outcome: Progressing  Goal: Fluid balance maintained  Description: INTERVENTIONS:  - Monitor labs   - Monitor I/O and WT  - Instruct patient on fluid and nutrition as appropriate  - Assess for signs & symptoms of volume excess or deficit  Outcome: Progressing     Problem: PAIN - ADULT  Goal: Verbalizes/displays adequate comfort level or baseline comfort level  Description: Interventions:  - Encourage patient to monitor pain and request assistance  - Assess pain using appropriate pain scale  - Administer analgesics based on type and severity of pain and evaluate response  - Implement non-pharmacological measures as appropriate and evaluate response  - Consider cultural and social influences on pain and pain management  - Notify physician/advanced practitioner if interventions unsuccessful or patient reports new pain  Outcome: Progressing     Problem: SAFETY ADULT  Goal: Patient will remain free of falls  Description: INTERVENTIONS:  - Assess patient frequently for physical needs  -  Identify cognitive and physical deficits and behaviors that affect risk of falls    -  [place] fall precautions as indicated by assessment   - Educate patient/family on patient safety including physical limitations  - Instruct patient to call for assistance with activity based on assessment  - Modify environment to reduce risk of injury  - Consider OT/PT consult to assist with strengthening/mobility  Outcome: Progressing  Goal: Maintain or return to baseline ADL function  Description: INTERVENTIONS:  -  Assess patient's ability to carry out ADLs; assess patient's baseline for ADL function and identify physical deficits which impact ability to perform ADLs (bathing, care of mouth/teeth, toileting, grooming, dressing, etc )  - Assess/evaluate cause of self-care deficits   - Assess range of motion  - Assess patient's mobility; develop plan if impaired  - Assess patient's need for assistive devices and provide as appropriate  - Encourage maximum independence but intervene and supervise when necessary  - Involve family in performance of ADLs  - Assess for home care needs following discharge   - Consider OT consult to assist with ADL evaluation and planning for discharge  - Provide patient education as appropriate  Outcome: Progressing  Goal: Maintain or return mobility status to optimal level  Description: INTERVENTIONS:  - Assess patient's baseline mobility status (ambulation, transfers, stairs, etc )    - Identify cognitive and physical deficits and behaviors that affect mobility  - Identify mobility aids required to assist with transfers and/or ambulation (gait belt, sit-to-stand, lift, walker, cane, etc )  - [place] fall precautions as indicated by assessment  - Record patient progress and toleration of activity level on Mobility SBAR; progress patient to next Phase/Stage  - Instruct patient to call for assistance with activity based on assessment  - Consider rehabilitation consult to assist with strengthening/weightbearing, etc   Outcome: Progressing     Problem: DISCHARGE PLANNING  Goal: Discharge to home or other facility with appropriate resources  Description: INTERVENTIONS:  - Identify barriers to discharge w/patient and caregiver  - Arrange for needed discharge resources and transportation as appropriate  - Identify discharge learning needs (meds, wound care, etc )  - Arrange for interpretive services to assist at discharge as needed  - Refer to Case Management Department for coordinating discharge planning if the patient needs post-hospital services based on physician/advanced practitioner order or complex needs related to functional status, cognitive ability, or social support system  Outcome: Progressing     Problem: Knowledge Deficit  Goal: Patient/family/caregiver demonstrates understanding of disease process, treatment plan, medications, and discharge instructions  Description: Complete learning assessment and assess knowledge base    Interventions:  - Provide teaching at level of understanding  - Provide teaching via preferred learning methods  Outcome: Progressing

## 2021-04-01 NOTE — ASSESSMENT & PLAN NOTE
· History of alcohol abuse, recently quit approximately 3 weeks ago    · No need for CIWA protocol  · Do not suspect hyponatremia presently related to this

## 2021-04-01 NOTE — DISCHARGE SUMMARY
[facility]  Discharge- Mortimer Chafe 1960, 61 y o  female MRN: [id number]  Unit/Bed#: [facility] 724-01 Encounter: [id number]  Primary Care Provider: THONG Markham   Date and time admitted to hospital: 3/30/2021  2:17 PM    * Hypokalemia  Assessment & Plan  Hypokalemia noted on outpatient labs, referred to ED by PCP   2 1 on admission  Magnesium 2 0   · Was given replacement with normalization today  · No events on telemetry, discontinued  · Stable for discharge, recommend she hold HCTZ, supplement K and repeat BMP on       Hyponatremia  Assessment & Plan  133 on admission then 134  Today is down to 128  I did give her a small IVF bolus  Unclear etiology  She does not wish to stay for further evaluation as her mother  last night  She agrees to get BMP in few days and f/u with PCP   · Holding HCTZ    Elevated troponin  Assessment & Plan  Troponin elevated 0 08/0 07  Flat  No complaints of chest pain, shortness of breath  Additionally, patient has an extremely strenuous job where she is consistently lifting, pushing, pulling walking up multiple flights of stairs without any difficulty, no chest pain/shortness of breath  · EKG showed ST depressions which are not significantly different from previous EKG on 2021  · No events on telemetry  · Echo done with normal EF and no RWMA    Anemia  Assessment & Plan  · Chronic, baseline between 10 and 12  Stable  No active signs or symptoms of bleeding  · Monitor      Hyperlipidemia  Assessment & Plan  · Continue atorvastatin      Alcohol use  Assessment & Plan  · History of alcohol abuse, recently quit approximately 3 weeks ago  · No need for CIWA protocol  · Do not suspect hyponatremia presently related to this     Essential hypertension  Assessment & Plan  · Continue Norvasc  HCTZ on hold secondary to hypokalemia  Would not resume on discharge      Tobacco abuse  Assessment & Plan  · Nicotine patch      Resolved Problems  Date Reviewed: 4/1/2021    None        Discharging Physician / Practitioner: Darryle Rod, PA-C  PCP: Matias Lazcano, [facility]  Admission Date:   Admission Orders (From admission, onward)     Ordered        03/31/21 1124  Inpatient Admission  Once         03/30/21 1556  Place in Observation  Once                   Discharge Date: 04/01/21    Consultations During Hospital Stay:  · NONE    Procedures Performed:   · Echo    Significant Findings / Test Results:   · hypoK  · HypoNa    Incidental Findings:   · See above     Test Results Pending at Discharge (will require follow up):   · none     Outpatient Tests Requested:  · F/u with BMP in few days    Complications:  NONE    Reason for Admission: hypokalemia    Hospital Course:   Maria D Moreno is a 61 y o  female patient who originally presented to the hospital on 3/30/2021 due to hypokalemia  [facility]     Ideally I asked patient to stay for another 24 hours to further eval and w/y hyponatremia however sadly her mother passed away last evening and she needs to leave  She agreed to get repeat labs in few days (script provided with CC to PCP) and have close f/u  She will return to ER with any worsening symptoms  Please see above list of diagnoses and related plan for additional information  Condition at Discharge: stable     Discharge Day Visit / Exam:   Subjective:  Sad due to personal news last night  Vitals: Blood Pressure: 119/81 (04/01/21 0748)  Pulse: 100 (04/01/21 0748)  Temperature: 97 9 °F (36 6 °C) (04/01/21 0748)  Temp Source: Oral (03/30/21 1404)  Respirations: 16 (03/31/21 2212)  Height: 5' 3" (160 cm) (03/30/21 1858)  Weight - Scale: 50 5 kg (111 lb 5 3 oz) (03/30/21 1858)  SpO2: 100 % (04/01/21 0748)  Exam:   Physical Exam  Vitals signs and nursing note reviewed  Constitutional:       General: She is not in acute distress  Cardiovascular:      Rate and Rhythm: Normal rate and regular rhythm  Pulmonary:      Effort: No respiratory distress  Abdominal:      General: There is no distension  Tenderness: There is no abdominal tenderness  Musculoskeletal:      Right lower leg: No edema  Left lower leg: No edema  Neurological:      Mental Status: She is oriented to person, place, and time  Psychiatric:         Mood and Affect: Mood normal           Discussion with Family: Patient declined call to   Discharge instructions/Information to patient and family:   See after visit summary for information provided to patient and family  Provisions for Follow-Up Care:  See after visit summary for information related to follow-up care and any pertinent home health orders  Disposition:   Home    Planned Readmission: no     Discharge Statement:  I spent 34 minutes discharging the patient  This time was spent on the day of discharge  I had direct contact with the patient on the day of discharge  Greater than 50% of the total time was spent examining patient, answering all patient questions, arranging and discussing plan of care with patient as well as directly providing post-discharge instructions  Additional time then spent on discharge activities  Discharge Medications:  See after visit summary for reconciled discharge medications provided to patient and/or family        **Please Note: This note may have been constructed using a voice recognition system**

## 2021-04-01 NOTE — RESTORATIVE TECHNICIAN NOTE
Restorative Specialist Mobility Note       Activity: Ambulate in condon, Ambulate in room, Bathroom privileges, Chair, Dangle, Stand at bedside(Educated/encouraged pt to ambulate with assistance 3-4 x's/day  Bed alarm on   Pt callbell, phone/tray within reach )     Assistive Device: None       Eleanor Zazueta BS, Restorative Technician, [employer]

## 2021-04-01 NOTE — ASSESSMENT & PLAN NOTE
133 on admission then 134  Today is down to 128  I did give her a small IVF bolus  Unclear etiology  She does not wish to stay for further evaluation as her mother  last night   She agrees to get BMP in few days and f/u with PCP   · Holding HCTZ

## 2021-04-01 NOTE — ASSESSMENT & PLAN NOTE
Hypokalemia noted on outpatient labs, referred to ED by PCP   2 1 on admission    Magnesium 2 0   · Was given replacement with normalization today  · No events on telemetry, discontinued  · Stable for discharge, recommend she hold HCTZ, supplement K and repeat BMP on Monday 4/5

## 2021-04-01 NOTE — ASSESSMENT & PLAN NOTE
Troponin elevated 0 08/0 07  Flat  No complaints of chest pain, shortness of breath  Additionally, patient has an extremely strenuous job where she is consistently lifting, pushing, pulling walking up multiple flights of stairs without any difficulty, no chest pain/shortness of breath    · EKG showed ST depressions which are not significantly different from previous EKG on 1/21/2021  · No events on telemetry  · Echo done with normal EF and no RWMA

## 2021-04-01 NOTE — DISCHARGE INSTRUCTIONS
You should get labs on Monday 4/5 to follow up on potassium/sodium levels  AVOID HCTZ medication for now    Hyponatremia   WHAT YOU NEED TO KNOW:   Hyponatremia occurs when the amount of sodium (salt) in your blood is lower than normal  Sodium is an electrolyte (mineral) that helps your muscles, heart, and digestive system work properly  It helps control blood pressure and fluid balance  DISCHARGE INSTRUCTIONS:   Follow up with your healthcare provider as directed: You may need to return for more tests  Write down your questions so you remember to ask them during your visits  Nutrition:  You may need to increase your intake of sodium  Foods that are high in sodium include milk, packaged snacks such as pretzels, or processed meats (keith, sausage, and ham)  Ask your dietitian to help you create a meal plan that is right for you  Liquids: Follow your healthcare provider's advice if you need to limit the amount of liquid you drink  Ask how much liquid to drink each day and which liquids are best for you  You may be asked to drink liquids that have water, sugar, and salt, such as juices, milk, or sports drinks  These liquids help your body hold in fluid and prevent dehydration  Contact your healthcare provider if:   · You have muscle cramps or twitching  · You feel very weak or tired  · You have nausea or are vomiting  · You have questions or concerns about your condition or care  Seek care immediately or call 911 if:   · You have a seizure  · You have an irregular heartbeat  · You have trouble breathing  · You cannot move your arms and legs  · You are confused or cannot think clearly  © Copyright [address] Information is for End User's use only and may not be sold, redistributed or otherwise used for commercial purposes   All illustrations and images included in CareNotes® are the copyrighted property of [employer]  or [facility] Diya Mccain   The above information is an  only  It is not intended as medical advice for individual conditions or treatments  Talk to your doctor, nurse or pharmacist before following any medical regimen to see if it is safe and effective for you  Hypokalemia   WHAT YOU NEED TO KNOW:   Hypokalemia is a low level of potassium in your blood  Potassium helps control how your muscles, heart, and digestive system work  Hypokalemia occurs when your body loses too much potassium or does not absorb enough from food  DISCHARGE INSTRUCTIONS:   Seek care immediately if:   · You cannot move your arm or leg  · You have a fast or irregular heartbeat  · You are too tired or weak to stand up  Contact your healthcare provider if:   · You are vomiting, or you have diarrhea  · You have numbness or tingling in your arms or legs  · Your symptoms do not go away or they get worse  · You have questions or concerns about your condition or care  Medicines:   · Potassium  will be given to bring your potassium levels back to normal     · Take your medicine as directed  Contact your healthcare provider if you think your medicine is not helping or if you have side effects  Tell him of her if you are allergic to any medicine  Keep a list of the medicines, vitamins, and herbs you take  Include the amounts, and when and why you take them  Bring the list or the pill bottles to follow-up visits  Carry your medicine list with you in case of an emergency  Eat foods that are high in potassium:  Foods that are high in potassium include bananas, oranges, tomatoes, potatoes, and avocado  Higgins beans, turkey, salmon, lean beef, yogurt, and milk are also high in potassium  Ask your healthcare provider or dietitian for more information about foods that are high in potassium  Follow up with your healthcare provider as directed:  Write down your questions so you remember to ask them during your visits     © Copyright [facility] [address] Information is for End User's use only and may not be sold, redistributed or otherwise used for commercial purposes  All illustrations and images included in CareNotes® are the copyrighted property of A [employer] Inc  or Severino Wan  The above information is an  only  It is not intended as medical advice for individual conditions or treatments  Talk to your doctor, nurse or pharmacist before following any medical regimen to see if it is safe and effective for you

## 2021-04-05 ENCOUNTER — TRANSITIONAL CARE MANAGEMENT (OUTPATIENT)
Dept: INTERNAL MEDICINE CLINIC | Facility: CLINIC | Age: 61
End: 2021-04-05

## 2021-04-06 ENCOUNTER — TELEPHONE (OUTPATIENT)
Dept: GASTROENTEROLOGY | Facility: CLINIC | Age: 61
End: 2021-04-06

## 2021-04-06 NOTE — TELEPHONE ENCOUNTER
----- Message from Casandra Albarran MD sent at 4/2/2021  6:36 PM EDT -----  Dear staff,    Please kindly share with patient that her transferrin receptor level is low which shows that her anemia is due to chronic disease  We will follow up her blood counts in clinic and decide further management plan  Thanks

## 2021-09-13 ENCOUNTER — TELEPHONE (OUTPATIENT)
Dept: OBGYN CLINIC | Facility: HOSPITAL | Age: 61
End: 2021-09-13

## 2021-09-13 ENCOUNTER — HOSPITAL ENCOUNTER (EMERGENCY)
Facility: HOSPITAL | Age: 61
Discharge: HOME | End: 2021-09-13
Attending: EMERGENCY MEDICINE | Admitting: EMERGENCY MEDICINE
Payer: COMMERCIAL

## 2021-09-13 ENCOUNTER — APPOINTMENT (EMERGENCY)
Dept: RADIOLOGY | Facility: HOSPITAL | Age: 61
End: 2021-09-13
Payer: COMMERCIAL

## 2021-09-13 VITALS
OXYGEN SATURATION: 97 % | SYSTOLIC BLOOD PRESSURE: 176 MMHG | HEIGHT: 63 IN | RESPIRATION RATE: 18 BRPM | WEIGHT: 109.13 LBS | BODY MASS INDEX: 19.34 KG/M2 | HEART RATE: 70 BPM | DIASTOLIC BLOOD PRESSURE: 81 MMHG | TEMPERATURE: 98.1 F

## 2021-09-13 DIAGNOSIS — S52.514A NONDISPLACED FRACTURE OF RIGHT RADIAL STYLOID PROCESS, INITIAL ENCOUNTER FOR CLOSED FRACTURE: Primary | ICD-10-CM

## 2021-09-13 PROCEDURE — 73130 X-RAY EXAM OF HAND: CPT

## 2021-09-13 PROCEDURE — 29125 APPL SHORT ARM SPLINT STATIC: CPT | Performed by: PHYSICIAN ASSISTANT

## 2021-09-13 PROCEDURE — 99284 EMERGENCY DEPT VISIT MOD MDM: CPT | Performed by: PHYSICIAN ASSISTANT

## 2021-09-13 PROCEDURE — 73110 X-RAY EXAM OF WRIST: CPT

## 2021-09-13 PROCEDURE — 99283 EMERGENCY DEPT VISIT LOW MDM: CPT

## 2021-09-13 RX ORDER — MELOXICAM 7.5 MG/1
7.5 TABLET ORAL DAILY
Qty: 10 TABLET | Refills: 0 | Status: SHIPPED | OUTPATIENT
Start: 2021-09-13 | End: 2021-09-30 | Stop reason: HOSPADM

## 2021-09-13 RX ORDER — IBUPROFEN 600 MG/1
600 TABLET ORAL ONCE
Status: COMPLETED | OUTPATIENT
Start: 2021-09-13 | End: 2021-09-13

## 2021-09-13 RX ORDER — ACETAMINOPHEN 325 MG/1
650 TABLET ORAL ONCE
Status: COMPLETED | OUTPATIENT
Start: 2021-09-13 | End: 2021-09-13

## 2021-09-13 RX ORDER — OXYCODONE HYDROCHLORIDE AND ACETAMINOPHEN 5; 325 MG/1; MG/1
1 TABLET ORAL
Qty: 3 TABLET | Refills: 0 | Status: SHIPPED | OUTPATIENT
Start: 2021-09-13 | End: 2021-09-16

## 2021-09-13 RX ADMIN — ACETAMINOPHEN 650 MG: 325 TABLET, FILM COATED ORAL at 10:10

## 2021-09-13 RX ADMIN — IBUPROFEN 600 MG: 600 TABLET, FILM COATED ORAL at 10:10

## 2021-09-13 NOTE — TELEPHONE ENCOUNTER
Patient is calling in requesting a call  Sent an email to [facility] for more assistance  MRN: [id number]     Patient Name: Juan AMEZQUITA B: [birth year]     Department & Location: ortho, jocelyn     Appointment Notes: N/I R wrist, Dr Staples pt      Visit Type: new patient      Provider Name: Zachary Vázquez      Appointment Desired Day: anytime      Best #: [phone]       Please advise,

## 2021-09-13 NOTE — Clinical Note
Fransisca Wilkes was seen and treated in our emergency department on 9/13/2021  No work until cleared by Family Doctor/Orthopedics        Diagnosis:     Li Belts    She may return on this date: If you have any questions or concerns, please don't hesitate to call        Waldemar Robbins PA-C    ______________________________           _______________          _______________  Hospital Representative                              Date                                Time

## 2021-09-13 NOTE — ED PROVIDER NOTES
History  Chief Complaint   Patient presents with   Learta January Fall     Patient reports a trip over dog's leash on Saturday; landed on the right side of her head and right wrist; denies LOC or thinners; right wrist swollen    Hand Injury     Francisco Drake is a 65 yo who presents to the ED today with right wrist injury  Was walking dog last night and tripped and landed on right hand  Most of the weight went onto the right hand  Patient is right handed  Also hit right forehead and got a small cut, but doesn't hurt  No loc  No neck pain  No injury to eye  No n/v  Not concerned for her head  Patient not on any thinners  Patient has prior hx of right hand injury and has chronic deformity and swelling to it  However the wrist swelling is new  Can move it with pain  No otc meds taken  Denies fevers, numbness, tingling and weakness  Prior to Admission Medications   Prescriptions Last Dose Informant Patient Reported? Taking?    FLUoxetine (PROzac) 10 mg capsule Unknown at Unknown time  No No   Sig: Take 1 capsule (10 mg total) by mouth daily   alendronate (FOSAMAX) 70 mg tablet Unknown at Unknown time  No No   Sig: Take 1 tablet (70 mg total) by mouth every 7 days   amLODIPine (NORVASC) 10 mg tablet Unknown at Unknown time  No No   Sig: take 1 tablet by mouth once daily   atorvastatin (LIPITOR) 20 mg tablet Unknown at Unknown time  No No   Sig: take 1 tablet by mouth once daily with dinner   potassium chloride (K-DUR,KLOR-CON) 20 mEq tablet Unknown at Unknown time  No No   Sig: Take 1 tablet (20 mEq total) by mouth daily      Facility-Administered Medications: None       Past Medical History:   Diagnosis Date    Anemia 3/30/2021    Hypertension     Shingles     Smoking 1/2 pack a day or less        Past Surgical History:   Procedure Laterality Date    BREAST CYST EXCISION Left     done in 6272[facility]    CAST APPLICATION Right 36/5/8571    Procedure: APPLICATION SHORT ARM ULNAR GAUNTLET CAST;  Surgeon: Catia Monsalve MD; [facility];  Service: Orthopedics    OR OPEN TX PHALANGEAL SHAFT FRACTURE PROX/MIDDLE EA Right 12/1/2020    Procedure: CLOSED REDUCTION AND PINNING OF RIGHT RING FINGER PROXIMAL PHALANX FRACTURE;  Surgeon: Doretha Jin MD;  [facility];  Service: Orthopedics       Family History   Problem Relation Age of Onset    Diabetes Mother     Alcohol abuse Father     Diabetes Sister     Diabetes Brother     No Known Problems Son     Substance Abuse Sister     Hypertension Sister     Diabetes Brother     No Known Problems Son     No Known Problems Son      I have reviewed and agree with the history as documented  E-Cigarette/Vaping    E-Cigarette Use Never User      E-Cigarette/Vaping Substances    Nicotine No     THC No     CBD No     Flavoring No     Other No     Unknown No      Social History     Tobacco Use    Smoking status: Current Every Day Smoker     Packs/day: 0 50     Years: 25 00     Pack years: 12 50     Types: Cigarettes    Smokeless tobacco: Never Used    Tobacco comment: cutting back with cigarette   Vaping Use    Vaping Use: Never used   Substance Use Topics    Alcohol use: Yes     Alcohol/week: 2 0 standard drinks     Types: 2 Cans of beer per week    Drug use: Not Currently     Frequency: 2 0 times per week     Types: Marijuana       Review of Systems   Constitutional: Negative for fever  Respiratory: Negative for shortness of breath  Cardiovascular: Negative for chest pain  Gastrointestinal: Negative for nausea and vomiting  Musculoskeletal: Negative for gait problem and neck pain  +right wrist pain/injury   Allergic/Immunologic: Negative for immunocompromised state  Neurological: Negative for syncope, facial asymmetry, weakness, numbness and headaches  Psychiatric/Behavioral: Negative for behavioral problems  All other systems reviewed and are negative  Physical Exam  Physical Exam  Vitals and nursing note reviewed  Constitutional:       General: She is not in acute distress  Appearance: She is well-developed and normal weight  She is not ill-appearing, toxic-appearing or diaphoretic  HENT:      Head: Normocephalic and atraumatic  Eyes:      Conjunctiva/sclera: Conjunctivae normal    Cardiovascular:      Rate and Rhythm: Normal rate and regular rhythm  Heart sounds: Normal heart sounds  No murmur heard  Pulmonary:      Effort: Pulmonary effort is normal  No respiratory distress  Breath sounds: Normal breath sounds  Musculoskeletal:      Right elbow: Normal       Right wrist: Swelling and tenderness present  No lacerations, snuff box tenderness or crepitus  Decreased range of motion  Normal pulse  Left wrist: Normal       Cervical back: Normal range of motion and neck supple  Skin:     General: Skin is warm and dry  Neurological:      General: No focal deficit present  Mental Status: She is alert and oriented to person, place, and time  Cranial Nerves: No cranial nerve deficit     Psychiatric:         Behavior: Behavior normal          Vital Signs  ED Triage Vitals [09/13/21 0905]   Temperature Pulse Respirations Blood Pressure SpO2   98 1 °F (36 7 °C) 72 18 (!) 210/100 98 %      Temp Source Heart Rate Source Patient Position - Orthostatic VS BP Location FiO2 (%)   Oral Monitor Sitting Right arm --      Pain Score       Worst Possible Pain           Vitals:    09/13/21 0905 09/13/21 1000   BP: (!) 210/100 (!) 176/81   Pulse: 72 70   Patient Position - Orthostatic VS: Sitting          Visual Acuity  Visual Acuity      Most Recent Value   L Pupil Size (mm)  3   R Pupil Size (mm)  3          ED Medications  Medications   acetaminophen (TYLENOL) tablet 650 mg (650 mg Oral Given 9/13/21 1010)   ibuprofen (MOTRIN) tablet 600 mg (600 mg Oral Given 9/13/21 1010)       Diagnostic Studies  Results Reviewed     None                 XR hand 3+ views RIGHT   Final Result by Domenica Erickson MD (09/13 1018)      Acute nondisplaced distal radial fracture            Workstation performed: [id number]         XR wrist 3+ views RIGHT   Final Result by Weyman Bamberger, MD (09/13 1017)      Acute nondisplaced distal radial fracture            Workstation performed: [id number]                    Procedures  Orthopedic injury treatment    Date/Time: 9/13/2021 1:46 PM  Performed by: Camilo Santiago PA-C  Authorized by: Camilo Santiago PA-C     Local anesthesia used?: No    General anesthesia used?: No    Immobilization:  Splint  Splint type:  Volar short arm  Supplies used:  Ortho-Glass  Neurovascular status: Neurovascularly intact    Distal perfusion: normal    Neurological function: normal    Patient tolerance:  Patient tolerated the procedure well with no immediate complications             ED Course         SBIRT 22yo+      Most Recent Value   SBIRT (24 yo +)   In order to provide better care to our patients, we are screening all of our patients for alcohol and drug use  Would it be okay to ask you these screening questions? No Filed at: 09/13/2021 0907            MDM    Disposition  Final diagnoses:   Nondisplaced fracture of right radial styloid process, initial encounter for closed fracture     Time reflects when diagnosis was documented in both MDM as applicable and the Disposition within this note     Time User Action Codes Description Comment    9/13/2021 10:22 AM Nithya Castellanos Add [S52 [id number]] Nondisplaced fracture of right radial styloid process, initial encounter for closed fracture       ED Disposition     ED Disposition Condition Date/Time Comment    Discharge Stable Mon Sep 13, 2021 10:22 AM Sin Vo discharge to home/self care              Follow-up Information     Follow up With Specialties Details Why Contact Info Additional [address] [facility] Orthopedic Surgery Schedule an appointment as soon as possible for a visit in 3 days  [phone] [address], [address] [facility], [place], [place], [address]    [address] Emergency Department Emergency Medicine   [address]  [address] [facility] [address] [facility] Emergency Department, [address] [facility], [place], [place], [address]    [address] Emergency Department Emergency Medicine  As needed [address]  [address] [facility] 64 [facility] Emergency Department, [address] [facility] [address], [place], [place], [address]   [phone]          Discharge Medication List as of 9/13/2021 10:26 AM      START taking these medications    Details   meloxicam (MOBIC) 7 5 mg tablet Take 1 tablet (7 5 mg total) by mouth daily for 10 days, Starting Mon 9/13/2021, Until Thu 9/23/2021, Normal      oxyCODONE-acetaminophen (PERCOCET) 5-325 mg per tablet Take 1 tablet by mouth daily at bedtime as needed for severe pain for up to 3 daysMax Daily Amount: 1 tablet, Starting Mon 9/13/2021, Until Thu 9/16/2021 at 2359, Normal         CONTINUE these medications which have NOT CHANGED    Details   alendronate (FOSAMAX) 70 mg tablet Take 1 tablet (70 mg total) by mouth every 7 days, Starting Tue 3/23/2021, Normal      amLODIPine (NORVASC) 10 mg tablet take 1 tablet by mouth once daily, Normal      atorvastatin (LIPITOR) 20 mg tablet take 1 tablet by mouth once daily with dinner, Normal      FLUoxetine (PROzac) 10 mg capsule Take 1 capsule (10 mg total) by mouth daily, Starting Tue 3/23/2021, Normal      potassium chloride (K-DUR,KLOR-CON) 20 mEq tablet Take 1 tablet (20 mEq total) by mouth daily, Starting Thu 4/1/2021, Normal           No discharge procedures on file      PDMP Review     None          ED Provider  Electronically Signed by           Jamie Gregg PA-C  09/13/21 6703

## 2021-09-15 ENCOUNTER — OFFICE VISIT (OUTPATIENT)
Dept: OBGYN CLINIC | Facility: HOSPITAL | Age: 61
End: 2021-09-15
Payer: COMMERCIAL

## 2021-09-15 VITALS
WEIGHT: 108.6 LBS | HEIGHT: 63 IN | DIASTOLIC BLOOD PRESSURE: 103 MMHG | BODY MASS INDEX: 19.24 KG/M2 | SYSTOLIC BLOOD PRESSURE: 213 MMHG | HEART RATE: 88 BPM

## 2021-09-15 DIAGNOSIS — S52.591A OTHER CLOSED FRACTURE OF DISTAL END OF RIGHT RADIUS, INITIAL ENCOUNTER: Primary | ICD-10-CM

## 2021-09-15 PROCEDURE — 99214 OFFICE O/P EST MOD 30 MIN: CPT | Performed by: ORTHOPAEDIC SURGERY

## 2021-09-15 PROCEDURE — 25600 CLTX DST RDL FX/EPHYS SEP WO: CPT | Performed by: ORTHOPAEDIC SURGERY

## 2021-09-15 NOTE — PROGRESS NOTES
ASSESSMENT/PLAN:    Assessment:   Right distal radius fracture    Plan:   Patient placed in a short arm cast today as we will treat this fracture non operatively  We discussed cast care restrictions including timing of intervention  We discussed strict elevation and minimal weight-bearing to the arm  We also discussed the fracture moves may require surgical intervention  Keep clean and dry    Follow Up:  6  week(s)    To Do Next Visit:  X-rays of the  right  wrist    General Discussions:  Fracture - Nonoperative Care: The physiology of a fractured bone was discussed with the patient today  With nondisplaced or minimally displaced fractures, conservative treatment often results in a functional recovery  Typically, these fractures are immobilized in either a cast or splint depending on the pattern  Radiographs are typically taken at intervals throughout the fracture healing to ensure that muscular forces do not cause loss of reduction or alignment  If the fracture loses its alignment, surgical intervention may be required to stabilize it  Medical conditions such as diabetes, osteoporosis, vitamin D deficiency, and a history of or exposure to smoking may delay or prevent fracture healing       _____________________________________________________  CHIEF COMPLAINT:  Chief Complaint   Patient presents with    Right Wrist - Injury, Fracture         SUBJECTIVE:  Ashley Ward is a 64 y o  female who presents with Fracture to the right wrist   Patient reports a trip and fall while walking her dog on 9/12/21  Patient was seen in the ED, placed in a splint, and referred here today     Radiation: Yes to the  wrist  Previous Treatments: bracing with only partial relief  Associated symptoms: Pain  Moderate  Intermittant  Sharp, Dull and Aching  Handedness: right  Work status: [employer]    PAST MEDICAL HISTORY:  Past Medical History:   Diagnosis Date    Anemia 3/30/2021    Hypertension     Shingles     Smoking 1/2 pack a day or less        PAST SURGICAL HISTORY:  Past Surgical History:   Procedure Laterality Date    BREAST CYST EXCISION Left     done in 9159A    CAST APPLICATION Right 86/9/2191    Procedure: APPLICATION SHORT ARM ULNAR GAUNTLET CAST;  Surgeon: Masood Randle MD;  [facility];  Service: Orthopedics    [place] OPEN TX PHALANGEAL SHAFT FRACTURE PROX/MIDDLE EA Right 12/1/2020    Procedure: CLOSED REDUCTION AND PINNING OF RIGHT RING FINGER PROXIMAL PHALANX FRACTURE;  Surgeon: Masood Randle MD;  [facility];  Service: Orthopedics       FAMILY HISTORY:  Family History   Problem Relation Age of Onset    Diabetes Mother     Alcohol abuse Father     Diabetes Sister     Diabetes Brother     No Known Problems Son     Substance Abuse Sister     Hypertension Sister     Diabetes Brother     No Known Problems Son     No Known Problems Son        SOCIAL HISTORY:  Social History     Tobacco Use    Smoking status: Current Every Day Smoker     Packs/day: 0 50     Years: 25 00     Pack years: 12 50     Types: Cigarettes    Smokeless tobacco: Never Used    Tobacco comment: cutting back with cigarette   Vaping Use    Vaping Use: Never used   Substance Use Topics    Alcohol use:  Yes     Alcohol/week: 2 0 standard drinks     Types: 2 Cans of beer per week    Drug use: Not Currently     Frequency: 2 0 times per week     Types: Marijuana       MEDICATIONS:    Current Outpatient Medications:     alendronate (FOSAMAX) 70 mg tablet, Take 1 tablet (70 mg total) by mouth every 7 days, Disp: 13 tablet, Rfl: 3    amLODIPine (NORVASC) 10 mg tablet, take 1 tablet by mouth once daily, Disp: 30 tablet, Rfl: 0    atorvastatin (LIPITOR) 20 mg tablet, take 1 tablet by mouth once daily with dinner, Disp: 30 tablet, Rfl: 0    FLUoxetine (PROzac) 10 mg capsule, Take 1 capsule (10 mg total) by mouth daily, Disp: 30 capsule, Rfl: 0    meloxicam (MOBIC) 7 5 mg tablet, Take 1 tablet (7 5 mg total) by mouth daily for 10 days, Disp: 10 tablet, Rfl: 0    oxyCODONE-acetaminophen (PERCOCET) 5-325 mg per tablet, Take 1 tablet by mouth daily at bedtime as needed for severe pain for up to 3 daysMax Daily Amount: 1 tablet, Disp: 3 tablet, Rfl: 0    potassium chloride (K-DUR,KLOR-CON) 20 mEq tablet, Take 1 tablet (20 mEq total) by mouth daily, Disp: 30 tablet, Rfl: 0    ALLERGIES:  Allergies   Allergen Reactions    Lisinopril        REVIEW OF SYSTEMS:  Pertinent items are noted in HPI  A comprehensive review of systems was negative  LABS:  HgA1c:   Lab Results   Component Value Date    HGBA1C 5 0 03/30/2021     BMP:   Lab Results   Component Value Date    GLUCOSE 117 02/04/2015    CALCIUM 9 0 04/01/2021     (L) 07/21/2016    K 3 4 (L) 04/01/2021    CO2 23 04/01/2021    CL 97 (L) 04/01/2021    BUN 6 04/01/2021    CREATININE 0 73 04/01/2021         _____________________________________________________  PHYSICAL EXAMINATION:  Vital signs: BP (!) 213/103   Pulse 88   Ht 5' 3" (1 6 m)   Wt 49 3 kg (108 lb 9 6 oz)   LMP  (LMP Unknown)   BMI 19 24 kg/m²   General: well developed and well nourished, alert, oriented times 3 and appears comfortable  Psychiatric: Normal  HEENT: Trachea Midline, No torticollis  Cardiovascular: No discernable arrhythmia  Pulmonary: No wheezing or stridor  Abdomen: No rebound or guarding  Extremities: No peripheral edema  Skin: No masses, erythema, lacerations, fluctation, ulcerations  Neurovascular: Sensation Intact to the Median, Ulnar, Radial Nerve, Motor Intact to the Median, Ulnar, Radial Nerve and Pulses Intact    MUSCULOSKELETAL EXAMINATION:  RIGHT SIDE:  Wrist:  Positive Tenderness distal radius over the fracture site no tenderness to palpation of the ulnar, elbow  No evidence laceration ulceration  Minimal ecchymosis and swelling  Full composite fist noted  Extensor pollicis longus tendon is intact  No instability    Diffuse swelling, opposition intact, full composite fist  Full elbow ROM, Non TTP  _____________________________________________________  STUDIES REVIEWED:  Images were reviewed in PACS by Dr Aimee Hathaway and demonstrate: right wrist: extra-articular fracture, stable non-displaced  No significant loss of dorsal angulation or radial inclination were noted        PROCEDURES PERFORMED:  Fracture / Dislocation Treatment    Date/Time: 9/15/2021 1:54 PM  Performed by: Meaghan Grant MD  Authorized by: Meaghan Grant MD     Patient Location:  Clinic  Other Assisting Provider: No    Consent given by:  Patient  Patient states understanding of procedure being performed: Yes    Patient identity confirmed:  Verbally with patient  Injury location:  Wrist  Location details:  Right wrist  Injury type:  Fracture  Fracture type: distal radius    Fracture type: distal radius    Neurovascular status: Neurovascularly intact    Manipulation performed?: No    Cast type:  Short arm  Neurovascular status: Neurovascularly intact            Scribe Attestation    I,:  Quinn Hernández am acting as a scribe while in the presence of the attending physician :       I,:  Meaghan Grant MD personally performed the services described in this documentation    as scribed in my presence :

## 2021-09-26 ENCOUNTER — HOSPITAL ENCOUNTER (EMERGENCY)
Facility: HOSPITAL | Age: 61
Discharge: HOME | End: 2021-09-26
Attending: EMERGENCY MEDICINE
Payer: COMMERCIAL

## 2021-09-26 VITALS
HEART RATE: 82 BPM | BODY MASS INDEX: 19.84 KG/M2 | SYSTOLIC BLOOD PRESSURE: 92 MMHG | DIASTOLIC BLOOD PRESSURE: 67 MMHG | RESPIRATION RATE: 18 BRPM | TEMPERATURE: 98.4 F | OXYGEN SATURATION: 100 % | WEIGHT: 112 LBS

## 2021-09-26 DIAGNOSIS — R11.2 NAUSEA & VOMITING: Primary | ICD-10-CM

## 2021-09-26 DIAGNOSIS — B34.9 VIRAL ILLNESS: ICD-10-CM

## 2021-09-26 LAB — SARS-COV-2 RNA RESP QL NAA+PROBE: NEGATIVE

## 2021-09-26 PROCEDURE — U0005 INFEC AGEN DETEC AMPLI PROBE: HCPCS | Performed by: EMERGENCY MEDICINE

## 2021-09-26 PROCEDURE — U0003 INFECTIOUS AGENT DETECTION BY NUCLEIC ACID (DNA OR RNA); SEVERE ACUTE RESPIRATORY SYNDROME CORONAVIRUS 2 (SARS-COV-2) (CORONAVIRUS DISEASE [COVID-19]), AMPLIFIED PROBE TECHNIQUE, MAKING USE OF HIGH THROUGHPUT TECHNOLOGIES AS DESCRIBED BY CMS-2020-01-R: HCPCS | Performed by: EMERGENCY MEDICINE

## 2021-09-26 PROCEDURE — 99283 EMERGENCY DEPT VISIT LOW MDM: CPT

## 2021-09-26 PROCEDURE — 99284 EMERGENCY DEPT VISIT MOD MDM: CPT | Performed by: EMERGENCY MEDICINE

## 2021-09-26 RX ORDER — ONDANSETRON 4 MG/1
4 TABLET, ORALLY DISINTEGRATING ORAL ONCE
Status: COMPLETED | OUTPATIENT
Start: 2021-09-26 | End: 2021-09-26

## 2021-09-26 RX ORDER — ONDANSETRON 4 MG/1
4 TABLET, FILM COATED ORAL EVERY 6 HOURS PRN
Qty: 12 TABLET | Refills: 0 | Status: SHIPPED | OUTPATIENT
Start: 2021-09-26 | End: 2021-09-30 | Stop reason: HOSPADM

## 2021-09-26 RX ADMIN — ONDANSETRON 4 MG: 4 TABLET, ORALLY DISINTEGRATING ORAL at 08:41

## 2021-09-28 ENCOUNTER — HOSPITAL ENCOUNTER (INPATIENT)
Facility: HOSPITAL | Age: 61
LOS: 2 days | Discharge: HOME | DRG: 241 | End: 2021-09-30
Attending: EMERGENCY MEDICINE | Admitting: HOSPITALIST
Payer: COMMERCIAL

## 2021-09-28 DIAGNOSIS — K26.4 DUODENAL ULCER HEMORRHAGE: ICD-10-CM

## 2021-09-28 DIAGNOSIS — D62 ACUTE BLOOD LOSS ANEMIA: ICD-10-CM

## 2021-09-28 DIAGNOSIS — Z72.0 TOBACCO ABUSE: ICD-10-CM

## 2021-09-28 DIAGNOSIS — K92.0 HEMATEMESIS: ICD-10-CM

## 2021-09-28 DIAGNOSIS — N17.9 AKI (ACUTE KIDNEY INJURY) (HCC): ICD-10-CM

## 2021-09-28 DIAGNOSIS — K92.2 GI BLEED: Primary | ICD-10-CM

## 2021-09-28 PROBLEM — K92.1 MELENA: Status: ACTIVE | Noted: 2021-09-28

## 2021-09-28 PROBLEM — I95.9 HYPOTENSION: Status: ACTIVE | Noted: 2021-09-28

## 2021-09-28 PROBLEM — R42 DIZZINESS: Status: ACTIVE | Noted: 2021-09-28

## 2021-09-28 LAB
ABO GROUP BLD: NORMAL
ABO GROUP BLD: NORMAL
ALBUMIN SERPL BCP-MCNC: 3.4 G/DL (ref 3.5–5)
ALP SERPL-CCNC: 64 U/L (ref 46–116)
ALT SERPL W P-5'-P-CCNC: 22 U/L (ref 12–78)
ANION GAP SERPL CALCULATED.3IONS-SCNC: 15 MMOL/L (ref 4–13)
ANION GAP SERPL CALCULATED.3IONS-SCNC: 9 MMOL/L (ref 4–13)
APTT PPP: 28 SECONDS (ref 23–37)
AST SERPL W P-5'-P-CCNC: 38 U/L (ref 5–45)
ATRIAL RATE: 104 BPM
BASOPHILS # BLD AUTO: 0.03 THOUSANDS/ΜL (ref 0–0.1)
BASOPHILS NFR BLD AUTO: 0 % (ref 0–1)
BILIRUB SERPL-MCNC: 0.45 MG/DL (ref 0.2–1)
BLD GP AB SCN SERPL QL: NEGATIVE
BUN SERPL-MCNC: 69 MG/DL (ref 5–25)
BUN SERPL-MCNC: 72 MG/DL (ref 5–25)
CALCIUM ALBUM COR SERPL-MCNC: 8.7 MG/DL (ref 8.3–10.1)
CALCIUM SERPL-MCNC: 7.6 MG/DL (ref 8.3–10.1)
CALCIUM SERPL-MCNC: 8.2 MG/DL (ref 8.3–10.1)
CHLORIDE SERPL-SCNC: 85 MMOL/L (ref 100–108)
CHLORIDE SERPL-SCNC: 86 MMOL/L (ref 100–108)
CO2 SERPL-SCNC: 22 MMOL/L (ref 21–32)
CO2 SERPL-SCNC: 28 MMOL/L (ref 21–32)
CREAT SERPL-MCNC: 1.58 MG/DL (ref 0.6–1.3)
CREAT SERPL-MCNC: 1.92 MG/DL (ref 0.6–1.3)
EOSINOPHIL # BLD AUTO: 0 THOUSAND/ΜL (ref 0–0.61)
EOSINOPHIL NFR BLD AUTO: 0 % (ref 0–6)
ERYTHROCYTE [DISTWIDTH] IN BLOOD BY AUTOMATED COUNT: 16.5 % (ref 11.6–15.1)
ERYTHROCYTE [DISTWIDTH] IN BLOOD BY AUTOMATED COUNT: 17.5 % (ref 11.6–15.1)
FERRITIN SERPL-MCNC: 63 NG/ML (ref 8–388)
GFR SERPL CREATININE-BSD FRML MDRD: 32 ML/MIN/1.73SQ M
GFR SERPL CREATININE-BSD FRML MDRD: 40 ML/MIN/1.73SQ M
GLUCOSE SERPL-MCNC: 123 MG/DL (ref 65–140)
GLUCOSE SERPL-MCNC: 125 MG/DL (ref 65–140)
HCT VFR BLD AUTO: 27.1 % (ref 34.8–46.1)
HCT VFR BLD AUTO: 32 % (ref 34.8–46.1)
HGB BLD-MCNC: 11.3 G/DL (ref 11.5–15.4)
HGB BLD-MCNC: 11.3 G/DL (ref 11.5–15.4)
HGB BLD-MCNC: 9.4 G/DL (ref 11.5–15.4)
HGB BLD-MCNC: 9.7 G/DL (ref 11.5–15.4)
IMM GRANULOCYTES # BLD AUTO: 0.16 THOUSAND/UL (ref 0–0.2)
IMM GRANULOCYTES NFR BLD AUTO: 1 % (ref 0–2)
INR PPP: 1.07 (ref 0.84–1.19)
IRON SATN MFR SERPL: 39 % (ref 15–50)
IRON SERPL-MCNC: 154 UG/DL (ref 50–170)
LYMPHOCYTES # BLD AUTO: 1.56 THOUSANDS/ΜL (ref 0.6–4.47)
LYMPHOCYTES NFR BLD AUTO: 9 % (ref 14–44)
MCH RBC QN AUTO: 29.1 PG (ref 26.8–34.3)
MCH RBC QN AUTO: 29.2 PG (ref 26.8–34.3)
MCHC RBC AUTO-ENTMCNC: 34.7 G/DL (ref 31.4–37.4)
MCHC RBC AUTO-ENTMCNC: 35.3 G/DL (ref 31.4–37.4)
MCV RBC AUTO: 83 FL (ref 82–98)
MCV RBC AUTO: 84 FL (ref 82–98)
MONOCYTES # BLD AUTO: 1.29 THOUSAND/ΜL (ref 0.17–1.22)
MONOCYTES NFR BLD AUTO: 7 % (ref 4–12)
NEUTROPHILS # BLD AUTO: 14.45 THOUSANDS/ΜL (ref 1.85–7.62)
NEUTS SEG NFR BLD AUTO: 83 % (ref 43–75)
NRBC BLD AUTO-RTO: 0 /100 WBCS
P AXIS: 70 DEGREES
PLATELET # BLD AUTO: 398 THOUSANDS/UL (ref 149–390)
PLATELET # BLD AUTO: 455 THOUSANDS/UL (ref 149–390)
PMV BLD AUTO: 10.3 FL (ref 8.9–12.7)
PMV BLD AUTO: 10.5 FL (ref 8.9–12.7)
POTASSIUM SERPL-SCNC: 2.8 MMOL/L (ref 3.5–5.3)
POTASSIUM SERPL-SCNC: 3.7 MMOL/L (ref 3.5–5.3)
PR INTERVAL: 124 MS
PROT SERPL-MCNC: 7.2 G/DL (ref 6.4–8.2)
PROTHROMBIN TIME: 13.5 SECONDS (ref 11.6–14.5)
QRS AXIS: 73 DEGREES
QRSD INTERVAL: 76 MS
QT INTERVAL: 338 MS
QTC INTERVAL: 444 MS
RBC # BLD AUTO: 3.23 MILLION/UL (ref 3.81–5.12)
RBC # BLD AUTO: 3.87 MILLION/UL (ref 3.81–5.12)
RH BLD: POSITIVE
RH BLD: POSITIVE
SODIUM SERPL-SCNC: 122 MMOL/L (ref 136–145)
SODIUM SERPL-SCNC: 123 MMOL/L (ref 136–145)
SPECIMEN EXPIRATION DATE: NORMAL
T WAVE AXIS: 55 DEGREES
TIBC SERPL-MCNC: 397 UG/DL (ref 250–450)
VENTRICULAR RATE: 104 BPM
WBC # BLD AUTO: 15.22 THOUSAND/UL (ref 4.31–10.16)
WBC # BLD AUTO: 17.49 THOUSAND/UL (ref 4.31–10.16)

## 2021-09-28 PROCEDURE — 99291 CRITICAL CARE FIRST HOUR: CPT | Performed by: EMERGENCY MEDICINE

## 2021-09-28 PROCEDURE — 99245 OFF/OP CONSLTJ NEW/EST HI 55: CPT | Performed by: INTERNAL MEDICINE

## 2021-09-28 PROCEDURE — 80048 BASIC METABOLIC PNL TOTAL CA: CPT | Performed by: HOSPITALIST

## 2021-09-28 PROCEDURE — 36415 COLL VENOUS BLD VENIPUNCTURE: CPT | Performed by: EMERGENCY MEDICINE

## 2021-09-28 PROCEDURE — 85027 COMPLETE CBC AUTOMATED: CPT | Performed by: HOSPITALIST

## 2021-09-28 PROCEDURE — 85610 PROTHROMBIN TIME: CPT | Performed by: EMERGENCY MEDICINE

## 2021-09-28 PROCEDURE — 86900 BLOOD TYPING SEROLOGIC ABO: CPT | Performed by: EMERGENCY MEDICINE

## 2021-09-28 PROCEDURE — C9113 INJ PANTOPRAZOLE SODIUM, VIA: HCPCS | Performed by: EMERGENCY MEDICINE

## 2021-09-28 PROCEDURE — 83550 IRON BINDING TEST: CPT | Performed by: HOSPITALIST

## 2021-09-28 PROCEDURE — 30233N1 TRANSFUSION OF NONAUTOLOGOUS RED BLOOD CELLS INTO PERIPHERAL VEIN, PERCUTANEOUS APPROACH: ICD-10-PCS | Performed by: EMERGENCY MEDICINE

## 2021-09-28 PROCEDURE — 36430 TRANSFUSION BLD/BLD COMPNT: CPT

## 2021-09-28 PROCEDURE — 99223 1ST HOSP IP/OBS HIGH 75: CPT | Performed by: HOSPITALIST

## 2021-09-28 PROCEDURE — 99285 EMERGENCY DEPT VISIT HI MDM: CPT

## 2021-09-28 PROCEDURE — 86920 COMPATIBILITY TEST SPIN: CPT

## 2021-09-28 PROCEDURE — C9113 INJ PANTOPRAZOLE SODIUM, VIA: HCPCS | Performed by: HOSPITALIST

## 2021-09-28 PROCEDURE — 82728 ASSAY OF FERRITIN: CPT | Performed by: HOSPITALIST

## 2021-09-28 PROCEDURE — 85018 HEMOGLOBIN: CPT | Performed by: HOSPITALIST

## 2021-09-28 PROCEDURE — 85730 THROMBOPLASTIN TIME PARTIAL: CPT | Performed by: EMERGENCY MEDICINE

## 2021-09-28 PROCEDURE — 93005 ELECTROCARDIOGRAM TRACING: CPT

## 2021-09-28 PROCEDURE — 85025 COMPLETE CBC W/AUTO DIFF WBC: CPT | Performed by: EMERGENCY MEDICINE

## 2021-09-28 PROCEDURE — 83540 ASSAY OF IRON: CPT | Performed by: HOSPITALIST

## 2021-09-28 PROCEDURE — 80053 COMPREHEN METABOLIC PANEL: CPT | Performed by: EMERGENCY MEDICINE

## 2021-09-28 PROCEDURE — 96374 THER/PROPH/DIAG INJ IV PUSH: CPT

## 2021-09-28 PROCEDURE — 86901 BLOOD TYPING SEROLOGIC RH(D): CPT | Performed by: EMERGENCY MEDICINE

## 2021-09-28 PROCEDURE — 93010 ELECTROCARDIOGRAM REPORT: CPT | Performed by: INTERNAL MEDICINE

## 2021-09-28 PROCEDURE — P9016 RBC LEUKOCYTES REDUCED: HCPCS

## 2021-09-28 PROCEDURE — 86850 RBC ANTIBODY SCREEN: CPT | Performed by: EMERGENCY MEDICINE

## 2021-09-28 RX ORDER — NICOTINE 21 MG/24HR
1 PATCH, TRANSDERMAL 24 HOURS TRANSDERMAL DAILY
Status: DISCONTINUED | OUTPATIENT
Start: 2021-09-28 | End: 2021-09-30 | Stop reason: HOSPADM

## 2021-09-28 RX ORDER — ONDANSETRON 2 MG/ML
4 INJECTION INTRAMUSCULAR; INTRAVENOUS EVERY 6 HOURS PRN
Status: DISCONTINUED | OUTPATIENT
Start: 2021-09-28 | End: 2021-09-30 | Stop reason: HOSPADM

## 2021-09-28 RX ORDER — PANTOPRAZOLE SODIUM 40 MG/1
40 INJECTION, POWDER, FOR SOLUTION INTRAVENOUS ONCE
Status: COMPLETED | OUTPATIENT
Start: 2021-09-28 | End: 2021-09-28

## 2021-09-28 RX ORDER — SODIUM CHLORIDE 9 MG/ML
50 INJECTION, SOLUTION INTRAVENOUS CONTINUOUS
Status: DISCONTINUED | OUTPATIENT
Start: 2021-09-28 | End: 2021-09-28

## 2021-09-28 RX ORDER — PANTOPRAZOLE SODIUM 40 MG/1
40 INJECTION, POWDER, FOR SOLUTION INTRAVENOUS EVERY 12 HOURS SCHEDULED
Status: DISCONTINUED | OUTPATIENT
Start: 2021-09-28 | End: 2021-09-29

## 2021-09-28 RX ADMIN — NICOTINE 1 PATCH: 14 PATCH, EXTENDED RELEASE TRANSDERMAL at 15:40

## 2021-09-28 RX ADMIN — SODIUM CHLORIDE 50 ML/HR: 0.9 INJECTION, SOLUTION INTRAVENOUS at 15:08

## 2021-09-28 RX ADMIN — PANTOPRAZOLE SODIUM 40 MG: 40 INJECTION, POWDER, FOR SOLUTION INTRAVENOUS at 22:26

## 2021-09-28 RX ADMIN — SODIUM CHLORIDE: 234 INJECTION, SOLUTION, CONCENTRATE INTRAVENOUS at 19:55

## 2021-09-28 RX ADMIN — PANTOPRAZOLE SODIUM 40 MG: 40 INJECTION, POWDER, FOR SOLUTION INTRAVENOUS at 10:10

## 2021-09-29 ENCOUNTER — ANESTHESIA EVENT (INPATIENT)
Dept: GASTROENTEROLOGY | Facility: HOSPITAL | Age: 61
DRG: 241 | End: 2021-09-29
Payer: COMMERCIAL

## 2021-09-29 ENCOUNTER — ANESTHESIA (INPATIENT)
Dept: GASTROENTEROLOGY | Facility: HOSPITAL | Age: 61
DRG: 241 | End: 2021-09-29
Payer: COMMERCIAL

## 2021-09-29 ENCOUNTER — APPOINTMENT (INPATIENT)
Dept: GASTROENTEROLOGY | Facility: HOSPITAL | Age: 61
DRG: 241 | End: 2021-09-29
Payer: COMMERCIAL

## 2021-09-29 PROBLEM — K26.4 DUODENAL ULCER HEMORRHAGE: Status: ACTIVE | Noted: 2021-09-28

## 2021-09-29 PROBLEM — K92.0 HEMATEMESIS: Status: RESOLVED | Noted: 2021-09-28 | Resolved: 2021-09-29

## 2021-09-29 LAB
ANION GAP SERPL CALCULATED.3IONS-SCNC: 4 MMOL/L (ref 4–13)
ANION GAP SERPL CALCULATED.3IONS-SCNC: 4 MMOL/L (ref 4–13)
ANION GAP SERPL CALCULATED.3IONS-SCNC: 7 MMOL/L (ref 4–13)
BUN SERPL-MCNC: 24 MG/DL (ref 5–25)
BUN SERPL-MCNC: 39 MG/DL (ref 5–25)
BUN SERPL-MCNC: 57 MG/DL (ref 5–25)
CALCIUM SERPL-MCNC: 8.1 MG/DL (ref 8.3–10.1)
CALCIUM SERPL-MCNC: 8.3 MG/DL (ref 8.3–10.1)
CALCIUM SERPL-MCNC: 8.6 MG/DL (ref 8.3–10.1)
CHLORIDE SERPL-SCNC: 101 MMOL/L (ref 100–108)
CHLORIDE SERPL-SCNC: 87 MMOL/L (ref 100–108)
CHLORIDE SERPL-SCNC: 95 MMOL/L (ref 100–108)
CO2 SERPL-SCNC: 25 MMOL/L (ref 21–32)
CO2 SERPL-SCNC: 28 MMOL/L (ref 21–32)
CO2 SERPL-SCNC: 28 MMOL/L (ref 21–32)
CREAT SERPL-MCNC: 0.72 MG/DL (ref 0.6–1.3)
CREAT SERPL-MCNC: 0.79 MG/DL (ref 0.6–1.3)
CREAT SERPL-MCNC: 0.93 MG/DL (ref 0.6–1.3)
ERYTHROCYTE [DISTWIDTH] IN BLOOD BY AUTOMATED COUNT: 16.7 % (ref 11.6–15.1)
GFR SERPL CREATININE-BSD FRML MDRD: 105 ML/MIN/1.73SQ M
GFR SERPL CREATININE-BSD FRML MDRD: 77 ML/MIN/1.73SQ M
GFR SERPL CREATININE-BSD FRML MDRD: 93 ML/MIN/1.73SQ M
GLUCOSE SERPL-MCNC: 95 MG/DL (ref 65–140)
GLUCOSE SERPL-MCNC: 97 MG/DL (ref 65–140)
GLUCOSE SERPL-MCNC: 97 MG/DL (ref 65–140)
HCT VFR BLD AUTO: 26.4 % (ref 34.8–46.1)
HGB BLD-MCNC: 9.4 G/DL (ref 11.5–15.4)
MCH RBC QN AUTO: 29.3 PG (ref 26.8–34.3)
MCHC RBC AUTO-ENTMCNC: 35.6 G/DL (ref 31.4–37.4)
MCV RBC AUTO: 82 FL (ref 82–98)
PLATELET # BLD AUTO: 316 THOUSANDS/UL (ref 149–390)
PMV BLD AUTO: 9.7 FL (ref 8.9–12.7)
POTASSIUM SERPL-SCNC: 2.6 MMOL/L (ref 3.5–5.3)
POTASSIUM SERPL-SCNC: 2.9 MMOL/L (ref 3.5–5.3)
POTASSIUM SERPL-SCNC: 3.8 MMOL/L (ref 3.5–5.3)
RBC # BLD AUTO: 3.21 MILLION/UL (ref 3.81–5.12)
SODIUM SERPL-SCNC: 122 MMOL/L (ref 136–145)
SODIUM SERPL-SCNC: 127 MMOL/L (ref 136–145)
SODIUM SERPL-SCNC: 130 MMOL/L (ref 136–145)
WBC # BLD AUTO: 10.71 THOUSAND/UL (ref 4.31–10.16)

## 2021-09-29 PROCEDURE — 0DJ08ZZ INSPECTION OF UPPER INTESTINAL TRACT, VIA NATURAL OR ARTIFICIAL OPENING ENDOSCOPIC: ICD-10-PCS | Performed by: INTERNAL MEDICINE

## 2021-09-29 PROCEDURE — 80048 BASIC METABOLIC PNL TOTAL CA: CPT | Performed by: INTERNAL MEDICINE

## 2021-09-29 PROCEDURE — 88305 TISSUE EXAM BY PATHOLOGIST: CPT | Performed by: PATHOLOGY

## 2021-09-29 PROCEDURE — 80048 BASIC METABOLIC PNL TOTAL CA: CPT | Performed by: HOSPITALIST

## 2021-09-29 PROCEDURE — 85027 COMPLETE CBC AUTOMATED: CPT | Performed by: HOSPITALIST

## 2021-09-29 PROCEDURE — 88342 IMHCHEM/IMCYTCHM 1ST ANTB: CPT | Performed by: PATHOLOGY

## 2021-09-29 PROCEDURE — 99232 SBSQ HOSP IP/OBS MODERATE 35: CPT | Performed by: INTERNAL MEDICINE

## 2021-09-29 PROCEDURE — 97166 OT EVAL MOD COMPLEX 45 MIN: CPT

## 2021-09-29 PROCEDURE — 43239 EGD BIOPSY SINGLE/MULTIPLE: CPT | Performed by: INTERNAL MEDICINE

## 2021-09-29 PROCEDURE — 97163 PT EVAL HIGH COMPLEX 45 MIN: CPT

## 2021-09-29 PROCEDURE — 97116 GAIT TRAINING THERAPY: CPT

## 2021-09-29 PROCEDURE — C9113 INJ PANTOPRAZOLE SODIUM, VIA: HCPCS | Performed by: HOSPITALIST

## 2021-09-29 RX ORDER — POTASSIUM CHLORIDE 14.9 MG/ML
20 INJECTION INTRAVENOUS ONCE
Status: COMPLETED | OUTPATIENT
Start: 2021-09-29 | End: 2021-09-29

## 2021-09-29 RX ORDER — FENTANYL CITRATE 50 UG/ML
INJECTION, SOLUTION INTRAMUSCULAR; INTRAVENOUS AS NEEDED
Status: DISCONTINUED | OUTPATIENT
Start: 2021-09-29 | End: 2021-09-29

## 2021-09-29 RX ORDER — PROPOFOL 10 MG/ML
INJECTION, EMULSION INTRAVENOUS AS NEEDED
Status: DISCONTINUED | OUTPATIENT
Start: 2021-09-29 | End: 2021-09-29

## 2021-09-29 RX ORDER — GLYCOPYRROLATE 0.2 MG/ML
INJECTION INTRAMUSCULAR; INTRAVENOUS AS NEEDED
Status: DISCONTINUED | OUTPATIENT
Start: 2021-09-29 | End: 2021-09-29

## 2021-09-29 RX ORDER — LIDOCAINE HYDROCHLORIDE 10 MG/ML
INJECTION, SOLUTION EPIDURAL; INFILTRATION; INTRACAUDAL; PERINEURAL AS NEEDED
Status: DISCONTINUED | OUTPATIENT
Start: 2021-09-29 | End: 2021-09-29

## 2021-09-29 RX ORDER — POTASSIUM CHLORIDE 14.9 MG/ML
20 INJECTION INTRAVENOUS
Status: DISPENSED | OUTPATIENT
Start: 2021-09-29 | End: 2021-09-29

## 2021-09-29 RX ORDER — PANTOPRAZOLE SODIUM 40 MG/1
40 TABLET, DELAYED RELEASE ORAL
Status: DISCONTINUED | OUTPATIENT
Start: 2021-09-29 | End: 2021-09-30 | Stop reason: HOSPADM

## 2021-09-29 RX ORDER — PANTOPRAZOLE SODIUM 40 MG/1
40 TABLET, DELAYED RELEASE ORAL
Qty: 60 TABLET | Refills: 0 | Status: SHIPPED | OUTPATIENT
Start: 2021-09-29 | End: 2021-09-30 | Stop reason: HOSPADM

## 2021-09-29 RX ADMIN — PROPOFOL 120 MG: 10 INJECTION, EMULSION INTRAVENOUS at 13:18

## 2021-09-29 RX ADMIN — FENTANYL CITRATE 25 MCG: 50 INJECTION INTRAMUSCULAR; INTRAVENOUS at 13:18

## 2021-09-29 RX ADMIN — LIDOCAINE HYDROCHLORIDE 50 MG: 10 INJECTION, SOLUTION EPIDURAL; INFILTRATION; INTRACAUDAL; PERINEURAL at 13:18

## 2021-09-29 RX ADMIN — POTASSIUM CHLORIDE 20 MEQ: 14.9 INJECTION, SOLUTION INTRAVENOUS at 07:06

## 2021-09-29 RX ADMIN — GLYCOPYRROLATE 0.2 MG: 0.2 INJECTION, SOLUTION INTRAMUSCULAR; INTRAVENOUS at 13:12

## 2021-09-29 RX ADMIN — PANTOPRAZOLE SODIUM 40 MG: 40 TABLET, DELAYED RELEASE ORAL at 16:01

## 2021-09-29 RX ADMIN — PROPOFOL 50 MG: 10 INJECTION, EMULSION INTRAVENOUS at 13:28

## 2021-09-29 RX ADMIN — PROPOFOL 30 MG: 10 INJECTION, EMULSION INTRAVENOUS at 13:19

## 2021-09-29 RX ADMIN — POTASSIUM CHLORIDE 20 MEQ: 14.9 INJECTION, SOLUTION INTRAVENOUS at 12:39

## 2021-09-29 RX ADMIN — PROPOFOL 50 MG: 10 INJECTION, EMULSION INTRAVENOUS at 13:24

## 2021-09-29 RX ADMIN — NICOTINE 1 PATCH: 14 PATCH, EXTENDED RELEASE TRANSDERMAL at 09:44

## 2021-09-29 RX ADMIN — PANTOPRAZOLE SODIUM 40 MG: 40 INJECTION, POWDER, FOR SOLUTION INTRAVENOUS at 09:44

## 2021-09-29 RX ADMIN — SODIUM CHLORIDE: 234 INJECTION, SOLUTION, CONCENTRATE INTRAVENOUS at 12:08

## 2021-09-29 RX ADMIN — POTASSIUM CHLORIDE 20 MEQ: 14.9 INJECTION, SOLUTION INTRAVENOUS at 01:38

## 2021-09-29 NOTE — ANESTHESIA POSTPROCEDURE EVALUATION
Post-Op Assessment Note    CV Status:  Stable  Pain Score: 0    Pain management: adequate     Mental Status:  Alert and awake   Hydration Status:  Euvolemic   PONV Controlled:  Controlled   Airway Patency:  Patent      Post Op Vitals Reviewed: Yes      Staff: CRNA   Comments: Pt awake, alert, able to maintain own airway, VSS, report to recovery RN        No complications documented      BP   92/63   Temp     Pulse  83   Resp   16   SpO2   100%

## 2021-09-29 NOTE — ANESTHESIA PREPROCEDURE EVALUATION
Procedure:  EGD    Relevant Problems   CARDIO   (+) Essential hypertension   (+) Hyperlipidemia   (+) Musculoskeletal chest pain      GI/HEPATIC   (+) GI bleed   (+) Hematemesis      /RENAL   (+) RAMOS (acute kidney injury) ([address] )      HEMATOLOGY   (+) Acute blood loss anemia   (+) Anemia      NEURO/PSYCH   (+) Acute non intractable tension-type headache   (+) Reactive depression      Adequately NPO for procedure  Smokes 1/2 ppd  Smokes marijuana about monthly  No previous anesthesia complications  Hyponatremic and hypokalemic being actively repleted  TTE 3/31/2021  LEFT VENTRICLE:  Systolic function was normal  Ejection fraction was estimated to be 60 %  There were no regional wall motion abnormalities  Wall thickness was moderately increased  There was moderate concentric hypertrophy  Doppler parameters were consistent with abnormal left ventricular relaxation (grade 1 diastolic dysfunction)      TRICUSPID VALVE:  There was mild regurgitation      HISTORY: PRIOR HISTORY: Hypertension  HLD  Tobacco abuse  Elevated troponin  Smoker      PROCEDURE: The procedure was performed at the bedside  This was a routine study  The transthoracic approach was used  The study included complete 2D imaging, M-mode, complete spectral Doppler, and color Doppler  Images were obtained from  the parasternal, apical, subcostal, and suprasternal notch acoustic windows  Image quality was adequate      LEFT VENTRICLE: Size was normal  Systolic function was normal  Ejection fraction was estimated to be 60 %  There were no regional wall motion abnormalities  Wall thickness was moderately increased  There was moderate concentric  hypertrophy   DOPPLER: Doppler parameters were consistent with abnormal left ventricular relaxation (grade 1 diastolic dysfunction)      RIGHT VENTRICLE: The size was normal  Systolic function was normal  Wall thickness was normal      LEFT ATRIUM: Size was normal      RIGHT ATRIUM: Size was normal      MITRAL VALVE: Valve structure was normal  There was normal leaflet separation  DOPPLER: The transmitral velocity was within the normal range  There was no evidence for stenosis  There was no regurgitation      AORTIC VALVE: The valve was trileaflet  Leaflets exhibited mildly increased thickness, normal cuspal separation, and sclerosis  DOPPLER: Transaortic velocity was within the normal range  There was no evidence for stenosis  There was no  regurgitation      TRICUSPID VALVE: The valve structure was normal  There was normal leaflet separation  DOPPLER: The transtricuspid velocity was within the normal range  There was no evidence for stenosis  There was mild regurgitation      PULMONIC VALVE: Leaflets exhibited normal thickness, no calcification, and normal cuspal separation  DOPPLER: The transpulmonic velocity was within the normal range  There was no regurgitation      PERICARDIUM: There was no pericardial effusion  The pericardium was normal in appearance      AORTA: The root exhibited normal size      SYSTEMIC VEINS: IVC: The inferior vena cava was normal in size and course  Respirophasic changes were normal   Physical Exam    Airway    Mallampati score: II  TM Distance: >3 FB  Neck ROM: full     Dental   Comment: Edentulous upper,     Cardiovascular  Rhythm: regular, Rate: normal,     Pulmonary  Breath sounds clear to auscultation,     Other Findings        Anesthesia Plan  ASA Score- 3     Anesthesia Type- IV sedation with anesthesia with ASA Monitors  Additional Monitors:   Airway Plan:     Comment: Spontaneous with supplemental O2  Plan Factors-Exercise tolerance (METS): >4 METS  Chart reviewed  EKG reviewed  Existing labs reviewed  Patient summary reviewed  Patient is a current smoker  Patient did not smoke on day of surgery  Induction- intravenous  Postoperative Plan-     Informed Consent- Anesthetic plan and risks discussed with patient    I personally reviewed this patient with the CRNA  Discussed and agreed on the Anesthesia Plan with the CRNA  Julieta Souza

## 2021-09-30 VITALS
HEART RATE: 55 BPM | SYSTOLIC BLOOD PRESSURE: 113 MMHG | TEMPERATURE: 98.1 F | OXYGEN SATURATION: 98 % | BODY MASS INDEX: 19.84 KG/M2 | HEIGHT: 63 IN | DIASTOLIC BLOOD PRESSURE: 68 MMHG | WEIGHT: 112 LBS | RESPIRATION RATE: 16 BRPM

## 2021-09-30 PROBLEM — N17.9 AKI (ACUTE KIDNEY INJURY) (HCC): Status: RESOLVED | Noted: 2021-09-28 | Resolved: 2021-09-30

## 2021-09-30 PROBLEM — I95.9 HYPOTENSION: Status: RESOLVED | Noted: 2021-09-28 | Resolved: 2021-09-30

## 2021-09-30 PROBLEM — K92.1 MELENA: Status: RESOLVED | Noted: 2021-09-28 | Resolved: 2021-09-30

## 2021-09-30 PROBLEM — R42 DIZZINESS: Status: RESOLVED | Noted: 2021-09-28 | Resolved: 2021-09-30

## 2021-09-30 LAB
ERYTHROCYTE [DISTWIDTH] IN BLOOD BY AUTOMATED COUNT: 17.2 % (ref 11.6–15.1)
HCT VFR BLD AUTO: 25 % (ref 34.8–46.1)
HGB BLD-MCNC: 8.6 G/DL (ref 11.5–15.4)
MAGNESIUM SERPL-MCNC: 2.1 MG/DL (ref 1.6–2.6)
MCH RBC QN AUTO: 29.5 PG (ref 26.8–34.3)
MCHC RBC AUTO-ENTMCNC: 34.4 G/DL (ref 31.4–37.4)
MCV RBC AUTO: 86 FL (ref 82–98)
PLATELET # BLD AUTO: 326 THOUSANDS/UL (ref 149–390)
PMV BLD AUTO: 10.3 FL (ref 8.9–12.7)
RBC # BLD AUTO: 2.92 MILLION/UL (ref 3.81–5.12)
WBC # BLD AUTO: 10.99 THOUSAND/UL (ref 4.31–10.16)

## 2021-09-30 PROCEDURE — 83735 ASSAY OF MAGNESIUM: CPT | Performed by: INTERNAL MEDICINE

## 2021-09-30 PROCEDURE — 85027 COMPLETE CBC AUTOMATED: CPT | Performed by: INTERNAL MEDICINE

## 2021-09-30 PROCEDURE — 99239 HOSP IP/OBS DSCHRG MGMT >30: CPT | Performed by: INTERNAL MEDICINE

## 2021-09-30 RX ORDER — CALCIUM CARBONATE 200(500)MG
500 TABLET,CHEWABLE ORAL 2 TIMES DAILY PRN
Status: DISCONTINUED | OUTPATIENT
Start: 2021-09-30 | End: 2021-09-30 | Stop reason: HOSPADM

## 2021-09-30 RX ORDER — OMEPRAZOLE 40 MG/1
40 CAPSULE, DELAYED RELEASE ORAL 2 TIMES DAILY
Qty: 60 CAPSULE | Refills: 0 | Status: SHIPPED | OUTPATIENT
Start: 2021-09-30

## 2021-09-30 RX ORDER — NICOTINE 21 MG/24HR
1 PATCH, TRANSDERMAL 24 HOURS TRANSDERMAL DAILY
Qty: 28 PATCH | Refills: 0
Start: 2021-10-01

## 2021-09-30 RX ADMIN — NICOTINE 1 PATCH: 14 PATCH, EXTENDED RELEASE TRANSDERMAL at 08:04

## 2021-09-30 RX ADMIN — CALCIUM CARBONATE (ANTACID) CHEW TAB 500 MG 500 MG: 500 CHEW TAB at 10:21

## 2021-09-30 RX ADMIN — PANTOPRAZOLE SODIUM 40 MG: 40 TABLET, DELAYED RELEASE ORAL at 05:45

## 2021-10-01 ENCOUNTER — TRANSITIONAL CARE MANAGEMENT (OUTPATIENT)
Dept: INTERNAL MEDICINE CLINIC | Facility: CLINIC | Age: 61
End: 2021-10-01

## 2021-10-02 LAB
ABO GROUP BLD BPU: NORMAL
ABO GROUP BLD BPU: NORMAL
BPU ID: NORMAL
BPU ID: NORMAL
CROSSMATCH: NORMAL
CROSSMATCH: NORMAL
UNIT DISPENSE STATUS: NORMAL
UNIT DISPENSE STATUS: NORMAL
UNIT PRODUCT CODE: NORMAL
UNIT PRODUCT CODE: NORMAL
UNIT PRODUCT VOLUME: 350 ML
UNIT PRODUCT VOLUME: 350 ML
UNIT RH: NORMAL
UNIT RH: NORMAL

## 2021-10-06 PROBLEM — D62 ACUTE BLOOD LOSS ANEMIA: Status: RESOLVED | Noted: 2021-09-28 | Resolved: 2021-10-06

## 2021-10-06 PROBLEM — IMO0001 SMOKING: Status: ACTIVE | Noted: 2020-01-20

## 2021-10-06 PROBLEM — D64.9 ANEMIA: Status: RESOLVED | Noted: 2021-03-30 | Resolved: 2021-10-06

## 2021-10-07 DIAGNOSIS — A04.8 H. PYLORI INFECTION: Primary | ICD-10-CM

## 2021-10-26 ENCOUNTER — TELEPHONE (OUTPATIENT)
Dept: OBGYN CLINIC | Facility: HOSPITAL | Age: 61
End: 2021-10-26

## 2021-10-27 ENCOUNTER — HOSPITAL ENCOUNTER (OUTPATIENT)
Dept: RADIOLOGY | Facility: HOSPITAL | Age: 61
Discharge: HOME | End: 2021-10-27
Attending: ORTHOPAEDIC SURGERY
Payer: COMMERCIAL

## 2021-10-27 ENCOUNTER — OFFICE VISIT (OUTPATIENT)
Dept: OBGYN CLINIC | Facility: HOSPITAL | Age: 61
End: 2021-10-27

## 2021-10-27 ENCOUNTER — OFFICE VISIT (OUTPATIENT)
Dept: OCCUPATIONAL THERAPY | Facility: HOSPITAL | Age: 61
End: 2021-10-27
Payer: COMMERCIAL

## 2021-10-27 VITALS
HEART RATE: 79 BPM | DIASTOLIC BLOOD PRESSURE: 99 MMHG | WEIGHT: 108.2 LBS | BODY MASS INDEX: 19.17 KG/M2 | HEIGHT: 63 IN | SYSTOLIC BLOOD PRESSURE: 193 MMHG

## 2021-10-27 DIAGNOSIS — M25.531 RIGHT WRIST PAIN: Primary | ICD-10-CM

## 2021-10-27 DIAGNOSIS — T14.8XXA FRACTURE: ICD-10-CM

## 2021-10-27 DIAGNOSIS — T14.8XXA FRACTURE: Primary | ICD-10-CM

## 2021-10-27 DIAGNOSIS — S52.591A OTHER CLOSED FRACTURE OF DISTAL END OF RIGHT RADIUS, INITIAL ENCOUNTER: ICD-10-CM

## 2021-10-27 PROCEDURE — 97760 ORTHOTIC MGMT&TRAING 1ST ENC: CPT

## 2021-10-27 PROCEDURE — 73110 X-RAY EXAM OF WRIST: CPT

## 2021-10-27 PROCEDURE — 99024 POSTOP FOLLOW-UP VISIT: CPT | Performed by: ORTHOPAEDIC SURGERY

## 2022-03-24 PROBLEM — M81.6 LOCALIZED OSTEOPOROSIS WITHOUT CURRENT PATHOLOGICAL FRACTURE: Status: ACTIVE | Noted: 2022-03-24

## 2022-03-24 PROBLEM — Z01.419 ENCOUNTER FOR ANNUAL ROUTINE GYNECOLOGICAL EXAMINATION: Status: ACTIVE | Noted: 2022-03-24

## 2022-03-24 PROBLEM — R92.2 DENSE BREAST TISSUE ON MAMMOGRAM: Status: ACTIVE | Noted: 2022-03-24

## 2022-03-24 PROBLEM — Z12.31 ENCOUNTER FOR SCREENING MAMMOGRAM FOR BREAST CANCER: Status: ACTIVE | Noted: 2022-03-24

## 2022-03-24 PROBLEM — Z72.0 TOBACCO ABUSE: Status: ACTIVE | Noted: 2022-03-24

## 2022-03-24 PROBLEM — R92.30 DENSE BREAST TISSUE ON MAMMOGRAM: Status: ACTIVE | Noted: 2022-03-24

## 2022-12-06 ENCOUNTER — HOSPITAL ENCOUNTER (EMERGENCY)
Facility: HOSPITAL | Age: 62
Discharge: HOME | End: 2022-12-06
Attending: EMERGENCY MEDICINE

## 2022-12-06 ENCOUNTER — APPOINTMENT (EMERGENCY)
Dept: RADIOLOGY | Facility: HOSPITAL | Age: 62
End: 2022-12-06

## 2022-12-06 VITALS
TEMPERATURE: 98.3 F | OXYGEN SATURATION: 100 % | RESPIRATION RATE: 18 BRPM | SYSTOLIC BLOOD PRESSURE: 181 MMHG | DIASTOLIC BLOOD PRESSURE: 111 MMHG | HEART RATE: 94 BPM

## 2022-12-06 DIAGNOSIS — B34.9 VIRAL SYNDROME: Primary | ICD-10-CM

## 2022-12-06 LAB
FLUAV RNA RESP QL NAA+PROBE: NEGATIVE
FLUBV RNA RESP QL NAA+PROBE: NEGATIVE
RSV RNA RESP QL NAA+PROBE: NEGATIVE
SARS-COV-2 RNA RESP QL NAA+PROBE: NEGATIVE

## 2022-12-06 NOTE — ED PROVIDER NOTES
HPI: Patient is a 58 y o  female who presents with 7 days of cough and fatigue which the patient describes at mild The patient has had contact with people with similar symptoms  The patient has not taken any medication  10 PPD smoking history  No fever, or any other symptoms at this time    Allergies   Allergen Reactions   • Lisinopril Other (See Comments)     Pt is unknown of reaction        Past Medical History:   Diagnosis Date   • Anemia 3/30/2021   • Hypertension    • Shingles    • Smoking 1/2 pack a day or less       Past Surgical History:   Procedure Laterality Date   • BREAST CYST EXCISION Left     done in 6066Q   • CAST APPLICATION Right 96/7/3316    Procedure: APPLICATION SHORT ARM ULNAR GAUNTLET CAST;  Surgeon: Jomar oTrres MD;  [facility];  Service: Orthopedics   • [place] OPEN TX PHALANGEAL SHAFT FRACTURE PROX/MIDDLE EA Right 12/1/2020    Procedure: CLOSED REDUCTION AND PINNING OF RIGHT RING FINGER PROXIMAL PHALANX FRACTURE;  Surgeon: Jomar Torres MD;  [facility];  Service: Orthopedics     Social History     Tobacco Use   • Smoking status: Every Day     Packs/day: 0 50     Years: 25 00     Pack years: 12 50     Types: Cigarettes   • Smokeless tobacco: Never   • Tobacco comments:     cutting back with cigarette   Vaping Use   • Vaping Use: Never used   Substance Use Topics   • Alcohol use:  Yes     Alcohol/week: 2 0 standard drinks     Types: 2 Cans of beer per week   • Drug use: Not Currently     Frequency: 2 0 times per week     Types: Marijuana       Nursing notes reviewed  Physical Exam:  ED Triage Vitals   Temperature Pulse Respirations Blood Pressure SpO2   12/06/22 1107 12/06/22 1107 12/06/22 1107 12/06/22 1108 12/06/22 1107   98 3 °F (36 8 °C) 94 18 (!) 181/111 100 %      Temp Source Heart Rate Source Patient Position - Orthostatic VS BP Location FiO2 (%)   12/06/22 1107 12/06/22 1107 12/06/22 1107 12/06/22 1107 --   Oral Monitor Sitting Right arm       Pain Score 12/06/22 1107       No Pain           ROS: Positive for cough and fatigue, the remainder of a 10 organ system ROS was otherwise unremarkable  General: awake, alert, no acute distress    Head: normocephalic, atraumatic    Eyes: no scleral icterus  Ears: external ears normal, hearing grossly intact  Nose: external exam grossly normal, negative nasal discharge  Neck: symmetric, No JVD noted, trachea midline  Pulmonary: no respiratory distress, no tachypnea noted  Cardiovascular: appears well perfused  Abdomen: no distention noted  Musculoskeletal: no deformities noted, tone normal  Neuro: grossly non-focal  Psych: mood and affect appropriate    XR chest 2 views   ED Interpretation   No acute cardiopulmonary disease  No significant changes from 2 years ago          The patient is stable and has a history and physical exam consistent with a viral illness  COVID19 testing has been performed  I considered the patient's other medical conditions as applicable/noted above in my medical decision making  The patient is stable upon discharge  The plan is for supportive care at home  The patient (and any family present) verbalized understanding of the discharge instructions and warnings that would necessitate return to the Emergency Department  All questions were answered prior to discharge  Medications - No data to display  Final diagnoses:   Viral syndrome     Time reflects when diagnosis was documented in both MDM as applicable and the Disposition within this note     Time User Action Codes Description Comment    12/6/2022 11:50 AM Rafael Buchanan Add [B34 9] Viral syndrome       ED Disposition     ED Disposition   Discharge    Condition   Stable    Date/Time   Tue Dec 6, 2022 11:50 AM    Comment   Jeffery Wiggins discharge to home/self care                 Follow-up Information     Follow up With Specialties Details Why Contact Info [facility], Nurse Practitioner, Family Medicine   [address] [place] [address]  [phone]       [facility] Emergency Medicine  As needed, If symptoms worsen [address] [id number]  [address] [facility] 64 [facility] Emergency Department, [address], [place], [place], [address]        Discharge Medication List as of 12/6/2022 11:51 AM      CONTINUE these medications which have NOT CHANGED    Details   nicotine (NICODERM CQ) 14 mg/24hr TD 24 hr patch Place 1 patch on the skin daily, Starting Fri 10/1/2021, No Print      omeprazole (PriLOSEC) 40 MG capsule Take 1 capsule (40 mg total) by mouth 2 (two) times a day, Starting Thu 9/30/2021, Normal           No discharge procedures on file      Electronically Signed by       Galilea Bryant PA-C  12/06/22 2149

## 2022-12-06 NOTE — Clinical Note
Shayne Preston was seen and treated in our emergency department on 12/6/2022  No restrictions            Diagnosis:     Everett Holden    She may return on this date: 12/07/2022    Is cleared to return to work since she has no fever     If you have any questions or concerns, please don't hesitate to call        Jayesh Lane PA-C    ______________________________           _______________          _______________  Hospital Representative                              Date                                Time

## 2023-05-25 NOTE — PATIENT INSTRUCTIONS
Cast and Splint Care    Splints and casts are supports that are used to protect injured bones and soft tissues  A cast completely encircles the limb with a hard, rigid outer shell  A splint provides rigid support along just the side(s) of the limb, with soft or open areas in between  Splints are often used in the immediate post-operative or injury phase, when there is a greater chance of swelling  A splint can better allow for swelling  Your doctor will decide which type of support is most appropriate for you and your arm condition  Materials  Casts are made with plaster or 'fiberglass' to form the hard supportive layer, and a soft lining of cotton or similar material for padding  Fiberglass is lighter, more durable, and breathes better than plaster  Plaster is less expensive and shapes better than fiberglass for some uses  Both materials come in strips and rolls, and are dipped in water to start the setting process  Most casts have a layer of padding underneath the hard material  X-rays can be taken through casts, but they do block some of the x-ray detail  Splints can be made with these same materials or with plastic, fabric, or padded aluminum  They can be custom-made or pre-made  They come in a variety of shapes and sizes to meet specific needs  They often have Velcro straps, which makes them easier to take on and off  For fiberglass casts, water-compatible padding does exist; ask your doctor if they have it available  Not all injuries can be treated with a waterproof cast  Waterproof casts can be submerged  It is best to avoid water from lakes, rivers and oceans when wearing a waterproof cast because your skin can become irritated if dirt or sand gets inside the cast  When you come in contact with chlorinated water or dirty water, rinse the cast with fresh water when you are done swimming  Allow the inside of the cast to drain as much as possible after it gets wet   If you have a cast that goes past May 25, 2023                 [facility]  Pediatrics  [address]  [place] [address]  Phone: [phone]  Fax: [phone]   May 25, 2023     Patient: Shannon Hicks   YOB: 2021   Date of Visit: 5/25/2023       To Whom it May Concern:    Shannon Hicks was seen in my clinic on 5/25/2023. She may return to school on 5/25/2023.    Please excuse her from any classes or work missed.    If you have any questions or concerns, please don't hesitate to call.    Sincerely,         Areli House MD      your elbow, be sure to drain the area around the elbow well  The rest of the water will evaporate  Sweat will not harm the cast liner, but consider rinsing the cast with clean water after excessive sweating  Swelling  Swelling due to your injury or surgery is usually the worst during the first 2 or 3 days  Swelling can cause pressure in your splint or cast, making it feel tighter  To help avoid or reduce swelling, you should put your hand and arm above your heart by propping it up on pillows or some other support if possible  Elevation helps gravity to drain the blood and fluid that causes swelling out of your hand and arm  Elevation can also decrease pain  If swelling increases too much, a cast or splint can become too tight  The following signs and symptoms should be watched for and, if they occur, you should contact your doctor promptly for advice:   worsening pain   numbness and tingling in your hand or fingers, which may indicate excessive pressure on the nerves   burning and stinging, which may result from too much pressure on the skin   excessive swelling of the hand, which may mean the veins are being blocked   loss of active movement of your fingers, which may indicate muscle damage    Sometimes a cast may need to be changed if the cast is too tight or if it gets too loose when the swelling goes down  Cast and Splint Care  Keep your cast/splint clean and dry  Being in contact with damp padding can irritate your skin  Plaster gets softer and weaker when it gets wet  Use plastic bags or a waterproof cast cover to keep your splint or cast dry when bathing  Seal the bag with tape or rubber bands  Elevate your hand in the shower above your head, because otherwise water can still run under the seal and into your cast  Do not keep it constantly covered because moisture may build up from normal sweating   Do not let dirt, sand, or other materials get inside of your splint or cast  If you feel itchy, do not place anything inside your cast because you can injure your skin; instead, ask your doctor for advice  Never trim the cast or splint by yourself  If there are rough edges or if your skin gets irritated around the edges of the cast, notify your doctor, who has the proper tools to fix it  If the cast or splint develops cracks or soft spots, contact your doctor to see if it needs to be repaired or changed  Cast Removal  Never try to remove a cast yourself; you may cut your skin or prevent proper healing of your injury  A cast should be removed only by a professional with the proper tools and training  Casts are removed with a special type of saw that will not cut your skin  Remember, a cast is there to protect you while your injury heals  It is only a temporary inconvenience, with the goal of helping you recover  © 2012 [employer]  www handcare  org

## 2023-07-04 ENCOUNTER — HOSPITAL ENCOUNTER (EMERGENCY)
Facility: HOSPITAL | Age: 63
Discharge: HOME | End: 2023-07-04
Attending: EMERGENCY MEDICINE
Payer: COMMERCIAL

## 2023-07-04 ENCOUNTER — APPOINTMENT (EMERGENCY)
Dept: RADIOLOGY | Facility: HOSPITAL | Age: 63
End: 2023-07-04
Payer: COMMERCIAL

## 2023-07-04 VITALS
TEMPERATURE: 98.4 F | HEART RATE: 92 BPM | RESPIRATION RATE: 18 BRPM | SYSTOLIC BLOOD PRESSURE: 202 MMHG | DIASTOLIC BLOOD PRESSURE: 100 MMHG | OXYGEN SATURATION: 95 %

## 2023-07-04 DIAGNOSIS — M25.562 ACUTE PAIN OF LEFT KNEE: ICD-10-CM

## 2023-07-04 DIAGNOSIS — M79.605 LEFT LEG PAIN: Primary | ICD-10-CM

## 2023-07-04 PROCEDURE — 73564 X-RAY EXAM KNEE 4 OR MORE: CPT

## 2023-07-04 PROCEDURE — 99284 EMERGENCY DEPT VISIT MOD MDM: CPT

## 2023-07-04 PROCEDURE — 99284 EMERGENCY DEPT VISIT MOD MDM: CPT | Performed by: EMERGENCY MEDICINE

## 2023-07-04 RX ADMIN — DICLOFENAC SODIUM TOPICAL GEL, 1%, 2 G: 10 GEL TOPICAL at 22:13

## 2023-07-04 NOTE — Clinical Note
Anyi Misha was seen and treated in our emergency department on 7/4/2023. Diagnosis:     Denise Calhoun  may return to work on return date. She may return on this date: 07/06/2023         If you have any questions or concerns, please don't hesitate to call.       Juan Rubio MD    ______________________________           _______________          _______________  Hospital Representative                              Date                                Time

## 2023-07-05 NOTE — ED ATTENDING ATTESTATION
7/4/2023  I, Suri Shin DO, saw and evaluated the patient. I have discussed the patient with the resident/non-physician practitioner and agree with the resident's/non-physician practitioner's findings, Plan of Care, and MDM as documented in the resident's/non-physician practitioner's note, except where noted. All available labs and Radiology studies were reviewed. I was present for key portions of any procedure(s) performed by the resident/non-physician practitioner and I was immediately available to provide assistance. At this point I agree with the current assessment done in the Emergency Department. I have conducted an independent evaluation of this patient a history and physical is as follows:    61 yof with left leg pain. Posterior knee. No injury. No calf swelling tender in the popliteal fossa but no mass. Neurovasculary intact.   Plan x-ray rule out pathologic fracture, osteoarthritis plan topical diclofenac    ED Course         Critical Care Time  Procedures

## 2023-07-05 NOTE — ED PROVIDER NOTES
History  Chief Complaint   Patient presents with   • Leg Pain     Pt states her L leg hurts her since this morning. Denies swelling. Thinks she had a muscle spasm. Patient is a 60-year-old female with past medical history of GERD, tobacco dependence, and frequent alcohol use. She presents to the ED for evaluation of new onset left lower leg/knee pain. She reports waking up today with 10/10 cramping pain in her calf and posterior knee. Pain is nonradiating. 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 Did work for several hours today, which caused pain to worsen. States pain is interfering with her ability to walk. Patient did not take any over-the-counter medications for pain today. She denies any other symptoms such as swelling, bruising, redness. No known injury to the area. No history of surgery to the area. Denies history of blood clots. Denies chest pain, shortness of breath, fever, or chill. Prior to Admission Medications   Prescriptions Last Dose Informant Patient Reported?  Taking?   nicotine (NICODERM CQ) 14 mg/24hr TD 24 hr patch   No No   Sig: Place 1 patch on the skin daily   omeprazole (PriLOSEC) 40 MG capsule   No No   Sig: Take 1 capsule (40 mg total) by mouth 2 (two) times a day      Facility-Administered Medications: None       Past Medical History:   Diagnosis Date   • Anemia 3/30/2021   • Hypertension    • Shingles    • Smoking 1/2 pack a day or less        Past Surgical History:   Procedure Laterality Date   • BREAST CYST EXCISION Left     done in 8377S   • CAST APPLICATION Right 10/1/0729    Procedure: APPLICATION SHORT ARM ULNAR GAUNTLET CAST;  Surgeon: Dede Jeffers MD;  Location: [facility] OR;  Service: Orthopedics   • [place] OPEN TX PHALANGEAL SHAFT FRACTURE PROX/MIDDLE EA Right 12/1/2020    Procedure: CLOSED REDUCTION AND PINNING OF RIGHT RING FINGER PROXIMAL PHALANX FRACTURE;  Surgeon: Dede Jeffers MD;  Location: [facility];  Service: Orthopedics       Family History   Problem Relation Age of Onset   • Diabetes Mother    • Alcohol abuse Father    • Diabetes Sister    • Diabetes Brother    • No Known Problems Son    • Substance Abuse Sister    • Hypertension Sister    • Diabetes Brother    • No Known Problems Son    • No Known Problems Son      I have reviewed and agree with the history as documented. E-Cigarette/Vaping   • E-Cigarette Use Never User      E-Cigarette/Vaping Substances   • Nicotine No    • THC No    • CBD No    • Flavoring No    • Other No    • Unknown No      Social History     Tobacco Use   • Smoking status: Every Day     Packs/day: 0.50     Years: 25.00     Total pack years: 12.50     Types: Cigarettes   • Smokeless tobacco: Never   • Tobacco comments:     cutting back with cigarette   Vaping Use   • Vaping Use: Never used   Substance Use Topics   • Alcohol use: Yes     Alcohol/week: 2.0 standard drinks of alcohol     Types: 2 Cans of beer per week   • Drug use: Not Currently     Frequency: 2.0 times per week     Types: Marijuana        Review of Systems   Constitutional: Negative for chills and fever. Respiratory: Negative for shortness of breath. Cardiovascular: Negative for chest pain and leg swelling. Gastrointestinal: Negative for abdominal pain, constipation, diarrhea, nausea and vomiting. Genitourinary: Negative for difficulty urinating. Vaginal discharge: left knee pain. Musculoskeletal: Positive for arthralgias. Negative for joint swelling. Skin: Negative for rash and wound. Neurological: Negative for dizziness and headaches.        Physical Exam  ED Triage Vitals [07/04/23 2023]   Temperature Pulse Respirations Blood Pressure SpO2   98.4 °F (36.9 °C) 92 18 (!) 202/100 95 %      Temp src Heart Rate Source Patient Position - Orthostatic VS BP Location FiO2 (%)   -- -- -- -- --      Pain Score       6             Orthostatic Vital Signs  Vitals:    07/04/23 2023   BP: (!) 202/100   Pulse: 92 Physical Exam  Constitutional:       General: She is not in acute distress. Appearance: Normal appearance. She is not ill-appearing, toxic-appearing or diaphoretic. HENT:      Head: Normocephalic and atraumatic. Right Ear: External ear normal.      Left Ear: External ear normal.      Nose: Nose normal.      Mouth/Throat:      Mouth: Mucous membranes are moist.      Pharynx: Oropharynx is clear. Eyes:      Extraocular Movements: Extraocular movements intact. Conjunctiva/sclera: Conjunctivae normal.   Cardiovascular:      Rate and Rhythm: Normal rate and regular rhythm. Pulses: Normal pulses. Heart sounds: Normal heart sounds. Pulmonary:      Effort: Pulmonary effort is normal. No respiratory distress. Breath sounds: Normal breath sounds. No stridor. No wheezing, rhonchi or rales. Abdominal:      General: Abdomen is flat. Bowel sounds are normal.      Palpations: Abdomen is soft. Tenderness: There is no abdominal tenderness. Musculoskeletal:         General: Tenderness (posterior knee) present. No swelling or signs of injury. Normal range of motion. Cervical back: Normal range of motion and neck supple. Right lower leg: No swelling, deformity, lacerations, tenderness or bony tenderness. No edema. Left lower leg: Tenderness (posterior knee) present. No swelling, deformity, lacerations or bony tenderness. No edema. Skin:     General: Skin is warm and dry. Findings: No bruising, lesion or rash. Neurological:      General: No focal deficit present. Mental Status: She is alert and oriented to person, place, and time. Psychiatric:         Mood and Affect: Mood normal.         Behavior: Behavior normal.         Thought Content:  Thought content normal.         Judgment: Judgment normal.         ED Medications  Medications   Diclofenac Sodium (VOLTAREN) 1 % topical gel 2 g (has no administration in time range)       Diagnostic Studies  Results Reviewed     None                 XR knee 4+ vw left injury    (Results Pending)         Procedures  Procedures      ED Course                                       Medical Decision Making  Xiomy Matos is a 61 y.o. who presents with complaints of left lower leg pain. No history of clots. No recent injury. No prior surgeries. Vital signs: patient afebrile. Hypertensive. Otherwise WNL. physical exam shows tenderness of left posterior knee. No swelling, bruising, erythema. No calf tenderness. Ddx including but not limited to: osteoarthritis vs. Acute sprain/strain vs. Baker's cyst     X ray knee unremarkable. No acute osseous abnormalities. voltaren gel applied    Dispo: Recommend outpatient follow-up and establishing care with PCP. Referral provided. Will provide prescription for Voltaren gel. Patient can utilize ice, elevation, rest, and knee brace obtained at pharmacy. Return precautions provided. Patient understands and is agreeable to plan. Stable for discharge home. Amount and/or Complexity of Data Reviewed  Radiology: ordered. Disposition  Final diagnoses:   None     ED Disposition     None      Follow-up Information    None         Patient's Medications   Discharge Prescriptions    No medications on file     No discharge procedures on file. PDMP Review     None           ED Provider  Attending physically available and evaluated Xiomy Matos. I managed the patient along with the ED Attending.     Electronically Signed by         Brion Kehr, MD  07/04/23 9442 With Specialties Details Why Contact Info Additional [address] [facility] Family Medicine Schedule an appointment as soon as possible for a visit in 1 week [facility] [id number]  [address], [address] [place], [place], [address] [place] [address]          Discharge Medication List as of 7/4/2023 10:17 PM      START taking these medications    Details   Diclofenac Sodium (VOLTAREN) 1 % Apply 2 g topically 4 (four) times a day as needed (apply to knee as needed for pain), Starting Tue 7/4/2023, Normal         CONTINUE these medications which have NOT CHANGED    Details   nicotine (NICODERM CQ) 14 mg/24hr TD 24 hr patch Place 1 patch on the skin daily, Starting Fri 10/1/2021, No Print      omeprazole (PriLOSEC) 40 MG capsule Take 1 capsule (40 mg total) by mouth 2 (two) times a day, Starting Thu 9/30/2021, Normal           No discharge procedures on file. PDMP Review     None           ED Provider  Attending physically available and evaluated Retia Height. I managed the patient along with the ED Attending.     Electronically Signed by         Anil Chisholm MD  07/04/23 4325       Anil Chisholm MD  07/18/23 4384

## 2023-07-05 NOTE — DISCHARGE INSTRUCTIONS
You can apply voltaren gel as directed to left knee as needed for pain. Please establish care with primary care physician. Contact information provided below. Return to ED for worsening pain, swelling, redness, chest pain, or shortness of breath.

## 2023-08-31 ENCOUNTER — VBI (OUTPATIENT)
Dept: ADMINISTRATIVE | Facility: OTHER | Age: 63
End: 2023-08-31

## 2023-10-26 ENCOUNTER — OFFICE VISIT (OUTPATIENT)
Dept: PODIATRY | Facility: CLINIC | Age: 63
End: 2023-10-26
Payer: COMMERCIAL

## 2023-10-26 VITALS — BODY MASS INDEX: 19.14 KG/M2 | HEIGHT: 63 IN | WEIGHT: 108 LBS

## 2023-10-26 DIAGNOSIS — M20.12 HALLUX VALGUS OF LEFT FOOT: Primary | ICD-10-CM

## 2023-10-26 DIAGNOSIS — M20.21 HALLUX RIGIDUS, RIGHT FOOT: ICD-10-CM

## 2023-10-26 PROCEDURE — 99203 OFFICE O/P NEW LOW 30 MIN: CPT | Performed by: PODIATRIST

## 2023-10-26 NOTE — PROGRESS NOTES
PATIENT:  Minh Kang  1960       ASSESSMENT:     1. Hallux valgus of left foot  XR foot 3+ vw left      2. Hallux rigidus, right foot  XR foot 3+ vw right                PLAN:  1. Reviewed medical records. Patient was counseled and educated on the condition and the diagnosis. 2. The diagnosis, treatment options and prognosis were discussed with the patient. 3. The patient feels conservative cares do not help at this time and wishes to proceed with surgical treatment. 4.  Explained surgical details and post-op course. Discussed all risks and complications related to the patient's condition and surgery. 5. Sent her for X-ray of both feet. 6. She will return in 3 weeks. Imaging: I have personally reviewed pertinent films in PACS  Labs, pathology, and Other Studies: I have personally reviewed pertinent reports. Subjective:       HPI  The patient presents with chief complaint of bilateral foot pain. She has chronic bunion on left foot for years. Pain has been worse now. She tried different shoes, inserts, and pads without resolving her pain. She has a trouble wearing closed shoes. She had right foot bunion surgery a long time ago. She notices increased bump on right 1st MPJ with pain now. No acute pedal problems or injury. No associated numbness or paresthesia. No significant weakness or dysfunction. The following portions of the patient's history were reviewed and updated as appropriate: allergies, current medications, past family history, past medical history, past social history, past surgical history and problem list.  All pertinent labs and images were reviewed.       Past Medical History  Past Medical History:   Diagnosis Date    Anemia 3/30/2021    Hypertension     Shingles     Smoking 1/2 pack a day or less        Past Surgical History  Past Surgical History:   Procedure Laterality Date    BREAST CYST EXCISION Left     done in 1970s    CAST APPLICATION Right 84/1/6513    Procedure: APPLICATION SHORT ARM ULNAR GAUNTLET CAST;  Surgeon: Minna Goel MD;  Location: [facility];  Service: Orthopedics    [place] OPEN [place] PROX/MIDDLE EA Right 12/1/2020    Procedure: CLOSED REDUCTION AND PINNING OF RIGHT RING FINGER PROXIMAL PHALANX FRACTURE;  Surgeon: Minna Goel MD;  Location: [facility];  Service: Orthopedics        Allergies:  Lisinopril    Medications:  Current Outpatient Medications   Medication Sig Dispense Refill    Diclofenac Sodium (VOLTAREN) 1 % Apply 2 g topically 4 (four) times a day as needed (apply to knee as needed for pain) 2 g 0    nicotine (NICODERM CQ) 14 mg/24hr TD 24 hr patch Place 1 patch on the skin daily 28 patch 0    omeprazole (PriLOSEC) 40 MG capsule Take 1 capsule (40 mg total) by mouth 2 (two) times a day 60 capsule 0     No current facility-administered medications for this visit. Social History:  Social History     Socioeconomic History    Marital status: /Civil Union     Spouse name: None    Number of children: None    Years of education: None    Highest education level: None   Occupational History    None   Tobacco Use    Smoking status: Every Day     Packs/day: 0.50     Years: 25.00     Total pack years: 12.50     Types: Cigarettes    Smokeless tobacco: Never    Tobacco comments:     cutting back with cigarette   Vaping Use    Vaping Use: Never used   Substance and Sexual Activity    Alcohol use:  Yes     Alcohol/week: 2.0 standard drinks of alcohol     Types: 2 Cans of beer per week    Drug use: Not Currently     Frequency: 2.0 times per week     Types: Marijuana    Sexual activity: Yes     Partners: Male     Birth control/protection: Post-menopausal   Other Topics Concern    None   Social History Narrative    Lives with her     Works full time    Diet: limits meat intake, eats fruits and vegetables, fiber intake is good    Exercise: no regular exercise, walks a lot of work Caffeine: occasional coffee and soda     Social Determinants of Health     Financial Resource Strain: Not on file   Food Insecurity: Not on file   Transportation Needs: Not on file   Physical Activity: Not on file   Stress: Not on file   Social Connections: Not on file   Intimate Partner Violence: Not on file   Housing Stability: Not on file          Review of Systems   Constitutional:  Negative for chills and fever. Respiratory:  Negative for cough and shortness of breath. Cardiovascular:  Negative for chest pain. Gastrointestinal:  Negative for nausea and vomiting. Skin:  Negative for wound. Allergic/Immunologic: Negative for immunocompromised state. Neurological:  Negative for weakness and numbness. Hematological: Negative. Psychiatric/Behavioral:  Negative for behavioral problems and confusion. Objective:      Ht 5' 3" (1.6 m)   Wt 49 kg (108 lb)   LMP  (LMP Unknown)   BMI 19.13 kg/m²          Physical Exam  Vitals reviewed. Constitutional:       General: She is not in acute distress. Appearance: She is not toxic-appearing or diaphoretic. HENT:      Head: Normocephalic and atraumatic. Eyes:      Extraocular Movements: Extraocular movements intact. Cardiovascular:      Rate and Rhythm: Normal rate and regular rhythm. Pulses: Normal pulses. Dorsalis pedis pulses are 2+ on the right side and 2+ on the left side. Posterior tibial pulses are 2+ on the right side and 2+ on the left side. Pulmonary:      Effort: Pulmonary effort is normal. No respiratory distress. Musculoskeletal:         General: Tenderness and deformity present. No signs of injury. Cervical back: Normal range of motion and neck supple. Right lower leg: No edema. Left lower leg: No edema. Right foot: No foot drop. Left foot: No foot drop. Comments: Bunion / HAV left foot with pain on palpation. Hypertrophy with limited ROM right 1st MPJ.      Skin: General: Skin is warm. Capillary Refill: Capillary refill takes less than 2 seconds. Coloration: Skin is not cyanotic or mottled. Findings: No abscess. Nails: There is no clubbing. Neurological:      General: No focal deficit present. Mental Status: She is alert and oriented to person, place, and time. Cranial Nerves: No cranial nerve deficit. Sensory: No sensory deficit. Motor: No weakness. Coordination: Coordination normal.   Psychiatric:         Mood and Affect: Mood normal.         Behavior: Behavior normal.         Thought Content:  Thought content normal.         Judgment: Judgment normal.

## 2024-03-27 ENCOUNTER — TELEPHONE (OUTPATIENT)
Dept: FAMILY MEDICINE CLINIC | Facility: CLINIC | Age: 64
End: 2024-03-27

## 2025-05-28 ENCOUNTER — TELEPHONE (OUTPATIENT)
Dept: FAMILY MEDICINE CLINIC | Facility: CLINIC | Age: 65
End: 2025-05-28

## 2025-07-14 ENCOUNTER — DOCUMENTATION (OUTPATIENT)
Dept: ADMINISTRATIVE | Facility: OTHER | Age: 65
End: 2025-07-14

## (undated) DEVICE — SPONGE PVP SCRUB WING STERILE

## (undated) DEVICE — DISPOSABLE EQUIPMENT COVER: Brand: SMALL TOWEL DRAPE

## (undated) DEVICE — DRAPE C-ARM X-RAY

## (undated) DEVICE — PADDING CAST 4 IN  COTTON STRL

## (undated) DEVICE — GLOVE SRG BIOGEL 7.5

## (undated) DEVICE — TAPE CAST 5IN FIBERGLASS 4YD WHITE

## (undated) DEVICE — CUFF TOURNIQUET 18 X 4 IN QUICK CONNECT DISP 1 BLADDER

## (undated) DEVICE — INTENDED FOR TISSUE SEPARATION, AND OTHER PROCEDURES THAT REQUIRE A SHARP SURGICAL BLADE TO PUNCTURE OR CUT.: Brand: BARD-PARKER SAFETY BLADES SIZE 15, STERILE

## (undated) DEVICE — GAUZE SPONGES,16 PLY: Brand: CURITY

## (undated) DEVICE — STERILE BETHLEHEM PLASTIC HAND: Brand: CARDINAL HEALTH

## (undated) DEVICE — CHLORAPREP HI-LITE 26ML ORANGE

## (undated) DEVICE — Device

## (undated) DEVICE — OCCLUSIVE GAUZE STRIP,3% BISMUTH TRIBROMOPHENATE IN PETROLATUM BLEND: Brand: XEROFORM

## (undated) DEVICE — GLOVE INDICATOR PI UNDERGLOVE SZ 8 BLUE